# Patient Record
Sex: MALE | Race: WHITE | HISPANIC OR LATINO | Employment: FULL TIME | URBAN - METROPOLITAN AREA
[De-identification: names, ages, dates, MRNs, and addresses within clinical notes are randomized per-mention and may not be internally consistent; named-entity substitution may affect disease eponyms.]

---

## 2018-11-20 ENCOUNTER — OFFICE VISIT (OUTPATIENT)
Dept: MEDICAL GROUP | Facility: PHYSICIAN GROUP | Age: 43
End: 2018-11-20
Payer: COMMERCIAL

## 2018-11-20 VITALS
SYSTOLIC BLOOD PRESSURE: 152 MMHG | OXYGEN SATURATION: 96 % | RESPIRATION RATE: 12 BRPM | BODY MASS INDEX: 31.5 KG/M2 | HEIGHT: 70 IN | HEART RATE: 84 BPM | WEIGHT: 220 LBS | DIASTOLIC BLOOD PRESSURE: 104 MMHG | TEMPERATURE: 99 F

## 2018-11-20 DIAGNOSIS — H11.003 PTERYGIUM OF BOTH EYES: ICD-10-CM

## 2018-11-20 DIAGNOSIS — R03.0 ELEVATED BLOOD PRESSURE READING: ICD-10-CM

## 2018-11-20 DIAGNOSIS — K21.9 GASTROESOPHAGEAL REFLUX DISEASE WITHOUT ESOPHAGITIS: ICD-10-CM

## 2018-11-20 PROCEDURE — 99214 OFFICE O/P EST MOD 30 MIN: CPT | Performed by: FAMILY MEDICINE

## 2018-11-20 ASSESSMENT — PATIENT HEALTH QUESTIONNAIRE - PHQ9: CLINICAL INTERPRETATION OF PHQ2 SCORE: 0

## 2018-11-20 NOTE — ASSESSMENT & PLAN NOTE
This is a new problem which is been going on for a few weeks now.  He does complain of some heartburn after having fatty meals, let us, and lactose-containing foods.  He has tried over-the-counter Tums and Nexium with much relief.  Typically will take the Nexium 1-2 times a week.  He denies any nausea, vomiting or weight loss.

## 2018-11-20 NOTE — PROGRESS NOTES
CC:  Diagnoses of Elevated blood pressure reading, Gastroesophageal reflux disease without esophagitis, Pterygium of both eyes, and BMI 31.0-31.9,adult were pertinent to this visit.    HISTORY OF THE PRESENT ILLNESS: Patient is a 43 y.o. male. This pleasant patient is here today to establish care.  He hasn't seen a doctor for a long time as he has been fairly healthy.  His father is a diabetic and Nadeem is concerned if he may also be a diabetic    Health Maintenance: He is declining the flu vaccine today      Elevated blood pressure reading  This is a new medical issue for him.  He hasn't seen a physician in years.  His blood pressure today 146/104.  He does mention that he is nervous about being here today.  BP was manually rechecked by myself with a reading of 152/104.  He denies any chest pain, palpitations or headaches.    Gastroesophageal reflux disease without esophagitis  This is a new problem which is been going on for a few weeks now.  He does complain of some heartburn after having fatty meals, let us, and lactose-containing foods.  He has tried over-the-counter Tums and Nexium with much relief.  Typically will take the Nexium 1-2 times a week.  He denies any nausea, vomiting or weight loss.      Pterygium of both eyes  This is a chronic issue which he has noticed for some time.  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  He currently denies any visual impairment, but he does have bilateral  pterygium medially.     BMI 31.0-31.9,adult  This is a chronic medical problem for him.  His weight is 220 pounds with a BMI of 31.57.  He doesn't exercise and does skip meals when he's at work.  Typically works 7-4 and will eat when he gets home.  He's concerned about diabetes as his father is a diabetic.      Allergies: Patient has no known allergies.    No current [facility]-ordered outpatient prescriptions on file.     No current [facility]-ordered facility-administered medications on file.   "      Past Medical History:   Diagnosis Date   • GERD (gastroesophageal reflux disease)        History reviewed. No pertinent surgical history.    Social History   Substance Use Topics   • Smoking status: Never Smoker   • Smokeless tobacco: Never Used   • Alcohol use Yes      Comment: Socially        Social History     Social History Narrative   • No narrative on file       Family History   Problem Relation Age of Onset   • Hypertension Mother    • Diabetes Father    • No Known Problems Sister    • No Known Problems Brother    • No Known Problems Brother    • No Known Problems Sister        ROS:     - Constitutional: Negative for fever, chills, and fatigue   - Eyes:  Negative blurry vision or eye pain    - ENT: Negative for ear pain, rhinorrhea, sinus congestion, sore throat    - Respiratory: Negative for cough or shortness of breath    - Cardiovascular: Negative for chest pain, palpitations    - Gastrointestinal:occasional heartburn, nausea, vomiting, abdominal pain,     - Genitourinary: Negative for dysuria    - Musculoskeletal: Negative for myalgias    - Skin: Negative for rash, itching    - Neurological: Negative for headaches, dizziness    - Endo:  Negative for increased thirst or polyuria    - Heme/Lymph: Does not bruise/bleed easily.     - Psychiatric/Behavioral: Negative for depression and anxiety   .      Exam: Blood pressure 152/104, pulse 84, temperature 37.2 °C (99 °F), resp. rate 12, height 1.778 m (5' 10\"), weight 99.8 kg (220 lb), SpO2 96 %. Body mass index is 31.57 kg/m².    General:  Large scar on right forehead, Normal appearing. No distress.  Eyes: bilateral medial pterygium no ptosis,  ENMT:   ears normal shape and contour, canals are clear bilaterally, tympanic membranes are benign, nasal mucosa benign, oropharynx is without erythema, edema or exudates.   Neck:  Supple without JVD or bruit. Thyroid is not enlarged.  Pulmonary:  Clear to ausculation.  Normal effort. No rales, ronchi, or " wheezing.  Cardiovascular:  Regular rate and rhythm without murmur.  Abdomen:  Soft, nontender, nondistended. Normal bowel sounds.  Neurologic:  No tremors  Lymph:  No cervical, supraclavicular  lymph nodes are palpable  Skin:  Warm and dry.  No obvious lesions.  Musculoskeletal:  Normal gait. No extremity cyanosis, clubbing, or edema.  Psych:   Normal mood and affect. Alert and oriented x3. Judgment and insight is normal.    Please note that this dictation was created using voice recognition software. I have made every reasonable attempt to correct obvious errors, but I expect that there are errors of grammar and possibly content that I did not discover before finalizing the note.      Assessment/Plan  1. Elevated blood pressure reading  He will return next week for a follow-up blood pressure reading  He is encouraged to get his labs drawn prior to the appointment.  - Lipid Profile; Future  - COMP METABOLIC PANEL; Future    2. Gastroesophageal reflux disease without esophagitis  This is a new medical problem which is currently well controlled with over-the-counter Nexium.    3. Pterygium of both eyes  This is a chronic issue and a referral to ophthalmology has been placed.  - REFERRAL TO OPHTHALMOLOGY    4. BMI 31.0-31.9,adult  This is a chronic issue and diet, exercise, weight loss, and lifestyle modifications have been discussed in detail with him today.  - Patient identified as having weight management issue.  Appropriate orders and counseling given.    F/u next week         No Follow-up on file.

## 2018-11-20 NOTE — ASSESSMENT & PLAN NOTE
This is a chronic medical problem for him.  His weight is 220 pounds with a BMI of 31.57.  He doesn't exercise and does skip meals when he's at work.  Typically works 7-4 and will eat when he gets home.  He's concerned about diabetes as his father is a diabetic.

## 2018-11-20 NOTE — ASSESSMENT & PLAN NOTE
This is a chronic issue which he has noticed for some time.  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  He currently denies any visual impairment, but he does have bilateral  pterygium medially.

## 2018-11-27 ENCOUNTER — OFFICE VISIT (OUTPATIENT)
Dept: MEDICAL GROUP | Facility: PHYSICIAN GROUP | Age: 43
End: 2018-11-27
Payer: COMMERCIAL

## 2018-11-27 VITALS
HEIGHT: 70 IN | SYSTOLIC BLOOD PRESSURE: 144 MMHG | OXYGEN SATURATION: 96 % | TEMPERATURE: 98.8 F | WEIGHT: 221 LBS | BODY MASS INDEX: 31.64 KG/M2 | DIASTOLIC BLOOD PRESSURE: 102 MMHG | RESPIRATION RATE: 12 BRPM | HEART RATE: 80 BPM

## 2018-11-27 DIAGNOSIS — Z23 NEED FOR VACCINATION: ICD-10-CM

## 2018-11-27 DIAGNOSIS — I10 ESSENTIAL HYPERTENSION: ICD-10-CM

## 2018-11-27 PROBLEM — R03.0 ELEVATED BLOOD PRESSURE READING: Status: RESOLVED | Noted: 2018-11-20 | Resolved: 2018-11-27

## 2018-11-27 PROCEDURE — 90471 IMMUNIZATION ADMIN: CPT | Performed by: FAMILY MEDICINE

## 2018-11-27 PROCEDURE — 90686 IIV4 VACC NO PRSV 0.5 ML IM: CPT | Performed by: FAMILY MEDICINE

## 2018-11-27 PROCEDURE — 99213 OFFICE O/P EST LOW 20 MIN: CPT | Mod: 25 | Performed by: FAMILY MEDICINE

## 2018-11-27 RX ORDER — LISINOPRIL 5 MG/1
5 TABLET ORAL DAILY
Qty: 30 TAB | Refills: 2 | Status: SHIPPED | OUTPATIENT
Start: 2018-11-27 | End: 2018-12-19 | Stop reason: SDUPTHER

## 2018-11-28 NOTE — PROGRESS NOTES
cc: High blood pressure    Subjective:     Nadeem Zapata is a 43 y.o. male presenting for a one-week follow-up of elevated blood pressure.  His blood pressure was elevated approximately 1 week ago and on further review of his records he did have high blood pressure 2 years ago as well    Essential hypertension  This is a new diagnosis for him.  His blood pressure has been elevated for at least the last 2 years.  He was seen in the ER August 2016 and at that time his blood pressure was 145/110.  Last week it was 152/104 and today it is 144/102.  He denies any headaches or chest pain.  He was given a prescription for lab draws but has not had a chance to get these labs drawn.  He does have a family history of hypertension.        Review of systems:    Patient Active Problem List    Diagnosis Date Noted   • Essential hypertension 11/27/2018   • Gastroesophageal reflux disease without esophagitis 11/20/2018   • Pterygium of both eyes 11/20/2018   • BMI 31.0-31.9,adult 11/20/2018     Past Medical History:   Diagnosis Date   • GERD (gastroesophageal reflux disease)      No current outpatient prescriptions on file prior to visit.     No current facility-administered medications on file prior to visit.      No Known Allergies  Family History   Problem Relation Age of Onset   • Hypertension Mother    • Diabetes Father    • No Known Problems Sister    • No Known Problems Brother    • No Known Problems Brother    • No Known Problems Sister      Social History   Substance Use Topics   • Smoking status: Never Smoker   • Smokeless tobacco: Never Used   • Alcohol use Yes      Comment: Socially        ROS  Constitutional :  No fevers, chills, fatigue.  Respiratory : No chronic cough, No shortness of breath,  Cardiovascular : No chest pain, No palpitations  Neurologic : No headaches,    Psychiatric : No depression, No anxiety      Current Outpatient Prescriptions:   •  lisinopril (PRINIVIL) 5 MG Tab, Take 1 Tab by mouth every  "day., Disp: 30 Tab, Rfl: 2    Allergies, past medical history, past surgical history, family history, social history reviewed and updated    Objective:     Vitals: /102   Pulse 80   Temp 37.1 °C (98.8 °F)   Resp 12   Ht 1.778 m (5' 10\")   Wt 100.2 kg (221 lb)   SpO2 96%   BMI 31.71 kg/m²   General:  Alert, pleasant, NAD  Heart:  Regular rate and rhythm. S1 and S2 normal. No murmurs appreciated. No LE edema  Respiratory:  Normal respiratory effort.  Clear to auscultation bilaterally.  Skin:  Warm, dry, no rashes, no jaundice  Neurological: No tremors,   Psych:   Affect/mood is normal, judgement is good, memory is intact, grooming is appropriate.    Assessment/Plan:     Nadeem was seen today for hypertension.    Diagnoses and all orders for this visit:    Essential hypertension  This is a new diagnosis for him.  I have encouraged him to get his labs drawn over the next 2 weeks.  We will start him on lisinopril 5 mg daily.  In the meantime he will check his blood pressure at a pharmacy twice a week and bring those readings to me at his next follow-up appointment.  I've written down for him our desired BP parameters.  -     lisinopril (PRINIVIL) 5 MG Tab; Take 1 Tab by mouth every day.    Need for vaccination  He will receive the flu vaccine today.  -     Influenza Vaccine Quad Injection >3Y (PF)        No Follow-up on file.  "

## 2018-11-28 NOTE — ASSESSMENT & PLAN NOTE
This is a new diagnosis for him.  His blood pressure has been elevated for at least the last 2 years.  He was seen in the ER August 2016 and at that time his blood pressure was 145/110.  Last week it was 152/104 and today it is 144/102.  He denies any headaches or chest pain.  He was given a prescription for lab draws but has not had a chance to get these labs drawn.

## 2018-12-19 DIAGNOSIS — I10 ESSENTIAL HYPERTENSION: ICD-10-CM

## 2018-12-19 RX ORDER — LISINOPRIL 5 MG/1
5 TABLET ORAL DAILY
Qty: 90 TAB | Refills: 3 | Status: SHIPPED | OUTPATIENT
Start: 2018-12-19 | End: 2019-03-18 | Stop reason: SDUPTHER

## 2019-01-02 ENCOUNTER — OFFICE VISIT (OUTPATIENT)
Dept: MEDICAL GROUP | Facility: PHYSICIAN GROUP | Age: 44
End: 2019-01-02
Payer: COMMERCIAL

## 2019-01-02 VITALS
DIASTOLIC BLOOD PRESSURE: 88 MMHG | RESPIRATION RATE: 12 BRPM | TEMPERATURE: 99.6 F | WEIGHT: 213 LBS | BODY MASS INDEX: 30.49 KG/M2 | OXYGEN SATURATION: 96 % | HEIGHT: 70 IN | HEART RATE: 86 BPM | SYSTOLIC BLOOD PRESSURE: 116 MMHG

## 2019-01-02 DIAGNOSIS — I10 ESSENTIAL HYPERTENSION: ICD-10-CM

## 2019-01-02 DIAGNOSIS — E78.2 MIXED HYPERLIPIDEMIA: ICD-10-CM

## 2019-01-02 DIAGNOSIS — K21.9 GASTROESOPHAGEAL REFLUX DISEASE WITHOUT ESOPHAGITIS: ICD-10-CM

## 2019-01-02 PROCEDURE — 99214 OFFICE O/P EST MOD 30 MIN: CPT | Performed by: FAMILY MEDICINE

## 2019-01-03 NOTE — ASSESSMENT & PLAN NOTE
This is a chronic medical condition for which he was started on lisinopril 5 mg a month ago.  His blood pressure today is 116/88.  He denies any headaches or palpitations.

## 2019-01-03 NOTE — ASSESSMENT & PLAN NOTE
This is a new diagnosis for him.  His most recent FLP from November 30, 2018 was total cholesterol 208, HDL 43, triglycerides 68, and .

## 2019-01-03 NOTE — PROGRESS NOTES
CC:  Diagnoses of Essential hypertension and Gastroesophageal reflux disease without esophagitis were pertinent to this visit.    HISTORY OF THE PRESENT ILLNESS: Patient is a 43 y.o. male. This pleasant patient is here today for BP check.  He was started on lisinopril around Thanksgiving.      Essential hypertension  This is a chronic medical condition for which he was started on lisinopril 5 mg a month ago.  His blood pressure today is 116/88.  He denies any headaches or palpitations.  He has been checking his BPs at a pharmacy and brings in a list of BP readings.  SBP range 110s-130s, with diastolics in the 70-80s range.      Gastroesophageal reflux disease without esophagitis  This is a chronic medical problem.  He continues to complain of heartburn after eating.   He complains of epigastric pain with these episodes. Takes tums daily.  Was previously also taking nexium but stopped taking it as he doesn't want to take too many meds.  Denies any nausea or vomiting.  Has intentionally lost approximately 8 lbs over the last 6 weeks by eating less.      Mixed hyperlipidemia  This is a new diagnosis for him.  His most recent FLP from November 30, 2018 was total cholesterol 208, HDL 43, triglycerides 68, and .      Allergies: Patient has no known allergies.    Current Outpatient Prescriptions Ordered in [facility]   Medication Sig Dispense Refill   • lisinopril (PRINIVIL) 5 MG Tab Take 1 Tab by mouth every day. 90 Tab 3     No current [facility]-ordered facility-administered medications on file.        Past Medical History:   Diagnosis Date   • GERD (gastroesophageal reflux disease)        History reviewed. No pertinent surgical history.    Social History   Substance Use Topics   • Smoking status: Never Smoker   • Smokeless tobacco: Never Used   • Alcohol use Yes      Comment: Socially        Social History     Social History Narrative   • No narrative on file       Family History   Problem Relation Age of Onset   •  "Hypertension Mother    • Diabetes Father    • No Known Problems Sister    • No Known Problems Brother    • No Known Problems Brother    • No Known Problems Sister        ROS:     - Constitutional: Negative for fever, chills, and fatigue   - Eyes:  Negative blurry vision or eye pain    - ENT: Negative for ear pain, rhinorrhea, sinus congestion, sore throat    - Respiratory: Negative for cough or shortness of breath    - Cardiovascular: Negative for chest pain, palpitations    - Gastrointestinal: Negative for heartburn, nausea, vomiting, abdominal pain,     - Genitourinary: Negative for dysuria    - Musculoskeletal: Negative for myalgias    - Skin: Negative for rash, itching    - Neurological: Negative for headaches, dizziness    - Endo:  Negative for increased thirst or polyuria    - Heme/Lymph: Does not bruise/bleed easily.     - Psychiatric/Behavioral: Negative for depression and anxiety   .      Exam: Blood pressure 116/88, pulse 86, temperature 37.6 °C (99.6 °F), resp. rate 12, height 1.778 m (5' 10\"), weight 96.6 kg (213 lb), SpO2 96 %. Body mass index is 30.56 kg/m².    General:  Normal appearing. No distress.  Neck:  Supple without JVD or bruit. Thyroid is not enlarged.  Pulmonary:  Clear to ausculation.  Normal effort. No rales, ronchi, or wheezing.  Cardiovascular: Regular rate and rhythm without murmur.  Abdomen:  Soft, nontender, nondistended. Normal bowel sounds.  Neurologic:  No tremors  Skin:  Warm and dry.  No obvious lesions.  Psych:   Normal mood and affect. Alert and oriented x3. Judgment and insight is normal.    Please note that this dictation was created using voice recognition software. I have made every reasonable attempt to correct obvious errors, but I expect that there are errors of grammar and possibly content that I did not discover before finalizing the note.      Assessment/Plan  1. Essential hypertension  This is a chronic medical problem which is well controlled with lisinopril 5 mg " daily.    2. Gastroesophageal reflux disease without esophagitis   This is a chronic medical condition which is not well controlled with over the counter tums.   He's encouraged to restart his nexium or try zantac.  He will let me know within 4 weeks.      3. Mixed hyperlipidemia  This is a new diagnosis for him.  We did discuss lifestyle modifications including diet, exercise, and weight loss.  Will plan on rechecking these values in 4-6 months.        Return in about 2 months (around 3/2/2019).

## 2019-01-03 NOTE — ASSESSMENT & PLAN NOTE
This is a chronic medical problem.  He continues to complain of heartburn after eating.   He complains of epigastric pain with these episodes. Takes tums daily.  Was previously also taking nexium but stopped taking it as he doesn't want to take too many meds.  Denies any nausea or vomiting.  Has intentionally lost approximately 8 lbs over the last 6 weeks by eating less.

## 2019-03-05 ENCOUNTER — OFFICE VISIT (OUTPATIENT)
Dept: MEDICAL GROUP | Facility: PHYSICIAN GROUP | Age: 44
End: 2019-03-05
Payer: COMMERCIAL

## 2019-03-05 VITALS
BODY MASS INDEX: 29.92 KG/M2 | DIASTOLIC BLOOD PRESSURE: 98 MMHG | WEIGHT: 209 LBS | HEIGHT: 70 IN | OXYGEN SATURATION: 96 % | SYSTOLIC BLOOD PRESSURE: 138 MMHG | RESPIRATION RATE: 12 BRPM | TEMPERATURE: 98.8 F | HEART RATE: 74 BPM

## 2019-03-05 DIAGNOSIS — I10 ESSENTIAL HYPERTENSION: ICD-10-CM

## 2019-03-05 DIAGNOSIS — K21.9 GASTROESOPHAGEAL REFLUX DISEASE WITHOUT ESOPHAGITIS: ICD-10-CM

## 2019-03-05 DIAGNOSIS — E78.2 MIXED HYPERLIPIDEMIA: ICD-10-CM

## 2019-03-05 DIAGNOSIS — R05.9 COUGH: ICD-10-CM

## 2019-03-05 PROCEDURE — 99214 OFFICE O/P EST MOD 30 MIN: CPT | Performed by: FAMILY MEDICINE

## 2019-03-05 ASSESSMENT — PATIENT HEALTH QUESTIONNAIRE - PHQ9: CLINICAL INTERPRETATION OF PHQ2 SCORE: 0

## 2019-03-06 NOTE — PROGRESS NOTES
cc: Follow-up hypertension    Subjective:     Nadeem Zapata is a 44 y.o. male presenting for follow-up of hypertension.  He is scheduled for oral surgery on March 12 and mentions that he just recently finished a 5-day course of amoxicillin.  He mentions that he is also scheduled for right eye surgery on April 23.    Essential hypertension  This is a chronic medical problem for which she was started on lisinopril late last year.  Currently he is taking lisinopril 5 mg daily.  He denies any headaches or chest pain.    He has been checking his blood pressure weekly at a local pharmacy and majority of those readings are systolics 120s or 130s.   His blood pressure today in the office is 138/98.   On my repeat check it is 140/98.     Cough  This is a new issue which started approximately 4 days ago.    He is complaining of cough at nighttime.  Denies any fever chills or upper respiratory symptoms.  Denies any sick contacts or any lower extremity edema.      Mixed hyperlipidemia  This is a chronic medical problem which was diagnosed in November 2018.    His labs were reviewed and his total cholesterol was elevated at 208, HDL 43, triglycerides 68, and .   He is currently trying to manage that with weight loss and diet.    Gastroesophageal reflux disease without esophagitis  This is a chronic medical problem for which he has noticed some improvement by changing his diet.  He is currently down to taking Nexium approximately 3 times a week.  Denies any nausea vomiting or abdominal pain.         Review of systems:    Patient Active Problem List    Diagnosis Date Noted   • Cough 03/05/2019   • Mixed hyperlipidemia 01/02/2019   • Essential hypertension 11/27/2018   • Gastroesophageal reflux disease without esophagitis 11/20/2018   • Pterygium of both eyes 11/20/2018   • BMI 31.0-31.9,adult 11/20/2018     Past Medical History:   Diagnosis Date   • GERD (gastroesophageal reflux disease)      Current Outpatient  "Prescriptions on File Prior to Visit   Medication Sig Dispense Refill   • lisinopril (PRINIVIL) 5 MG Tab Take 1 Tab by mouth every day. 90 Tab 3     No current facility-administered medications on file prior to visit.      No Known Allergies  Family History   Problem Relation Age of Onset   • Hypertension Mother    • Diabetes Father    • No Known Problems Sister    • No Known Problems Brother    • No Known Problems Brother    • No Known Problems Sister      Social History   Substance Use Topics   • Smoking status: Never Smoker   • Smokeless tobacco: Never Used   • Alcohol use Yes      Comment: Socially        ROS  Constitutional :  No fevers, chills, fatigue.  Eye :  No eye pain, no redness,  ENT: no sore throat, no tinnitus  Respiratory : Nighttime cough, No shortness of breath,  Cardiovascular : No chest pain, No palpitations  Gastrointestinal : Occasional heartburn but overall much improved.  No abdominal pain, No nausea, vomiting, diarrhea, or constipation  Genitourinary:  no dysuria, no hematuria  Musculoskeletal/Extremities : no muscle weakness, no joint swelling,  Hematologic/Lymphatic :  No easy bruising, No night sweats, No swollen nodes  Skin/Integumentary :No evidence of rash. No pruritus  Neurologic : No headaches,  No tremors,  Psychiatric : No depression, No anxiety      Current Outpatient Prescriptions:   •  lisinopril (PRINIVIL) 5 MG Tab, Take 1 Tab by mouth every day., Disp: 90 Tab, Rfl: 3    Allergies, past medical history, past surgical history, family history, social history reviewed and updated    Objective:     Vitals: /98   Pulse 74   Temp 37.1 °C (98.8 °F)   Resp 12   Ht 1.778 m (5' 10\")   Wt 94.8 kg (209 lb)   SpO2 96%   BMI 29.99 kg/m²   General:  Alert, pleasant, NAD  ENMT:  External ears and nose are normal. , Oropharynx non-erythematous, no exudates    Neck  supple,  No thyromegaly or masses palpated,  No cervical or supraclavicular lymphadenopathy.  Heart:  Regular rate and " rhythm,  No LE edema  Respiratory:  Normal respiratory effort, Clear to auscultation bilaterally.  Abdomen:   soft, Non-distended, non tender,   Skin:  Warm, dry, no rashes, no jaundice  Musculoskeletal:  Normal gait, Normal digits and nails.  Neurological: No tremors,  Psych:   Affect/mood is normal, judgement is good, memory is intact, grooming is appropriate.    Assessment/Plan:     1. Essential hypertension  Chronic medical condition.  Home blood pressure readings are stable.  His blood pressure is slightly elevated in the office.  For now he will continue with lisinopril 5 mg daily.  He will keep a blood pressure log for the next week and follow-up with us in 1 week for an MA visit for blood pressure.    2. Cough  This is a new problem for him.  He does not have any other associated symptoms with this cough.  We will continue to monitor for now.  Cough as a side effect of lisinopril was discussed with the patient.  For now no changes to his medication.    3. Mixed hyperlipidemia  Chronic medical problem.  Currently on no medications.  We will recheck labs prior to his next appointment with me.  - Lipid Profile; Future    4. Gastroesophageal reflux disease without esophagitis  Chronic medical problem.  Improving.  Continue with Nexium as needed.      Return in about 3 months (around 6/5/2019) for 1 week for MA visit and 3 mths with PCP.

## 2019-03-13 ENCOUNTER — APPOINTMENT (OUTPATIENT)
Dept: MEDICAL GROUP | Facility: PHYSICIAN GROUP | Age: 44
End: 2019-03-13
Payer: COMMERCIAL

## 2019-03-18 ENCOUNTER — OFFICE VISIT (OUTPATIENT)
Dept: MEDICAL GROUP | Facility: PHYSICIAN GROUP | Age: 44
End: 2019-03-18
Payer: COMMERCIAL

## 2019-03-18 VITALS
WEIGHT: 215 LBS | TEMPERATURE: 98.4 F | RESPIRATION RATE: 16 BRPM | OXYGEN SATURATION: 96 % | DIASTOLIC BLOOD PRESSURE: 92 MMHG | BODY MASS INDEX: 30.1 KG/M2 | HEART RATE: 87 BPM | HEIGHT: 71 IN | SYSTOLIC BLOOD PRESSURE: 148 MMHG

## 2019-03-18 DIAGNOSIS — R05.9 COUGH: ICD-10-CM

## 2019-03-18 DIAGNOSIS — I10 ESSENTIAL HYPERTENSION: ICD-10-CM

## 2019-03-18 PROCEDURE — 99214 OFFICE O/P EST MOD 30 MIN: CPT | Performed by: FAMILY MEDICINE

## 2019-03-18 RX ORDER — LISINOPRIL 10 MG/1
10 TABLET ORAL DAILY
Qty: 90 TAB | Refills: 3 | Status: SHIPPED | OUTPATIENT
Start: 2019-03-18 | End: 2020-06-05

## 2019-03-18 RX ORDER — BENZONATATE 100 MG/1
100 CAPSULE ORAL 3 TIMES DAILY PRN
Qty: 60 CAP | Refills: 0 | Status: ON HOLD | OUTPATIENT
Start: 2019-03-18 | End: 2019-04-23

## 2019-03-18 NOTE — PROGRESS NOTES
"cc: Hypertension    Subjective:     Nadeem Zapata is a 44 y.o. male presenting for further evaluation of his hypertension.  He was seen for a nurse's visit 5 days ago and at that time his lisinopril was increased to 10 mg daily.    1.  Hypertension  This is a chronic medical problem is currently not well controlled.  His blood pressure today in the office was initially 148/92.  On my repeat check it was 136/94.  He mentions that he has been checking his blood pressure at a local pharmacy with systolics in the 120s range and diastolics in the 80s.  He denies any chest pain or headaches.  Does mention that he is currently fighting an upper respiratory illness.    2.  Cough  This is an acute illness which started approximately 1 week ago.  He denies any fevers or chills.  Denies any sore throat or any sick contacts.  Complains that his cough is worse at nighttime.  Also complains of some nasal congestion.  Denies any ear pain or ear fullness.  Has not been trying any over-the-counter medications for this.  Has not noticed any improvement in his symptoms.    Review of systems:  See above and   No Known Allergies      Current Outpatient Prescriptions:   •  lisinopril (PRINIVIL) 10 MG Tab, Take 1 Tab by mouth every day., Disp: 90 Tab, Rfl: 3  •  benzonatate (TESSALON) 100 MG Cap, Take 1 Cap by mouth 3 times a day as needed for Cough., Disp: 60 Cap, Rfl: 0    Allergies, past medical history, past surgical history, family history, social history reviewed and updated    Objective:     Vitals: /92 (BP Location: Left arm, Patient Position: Sitting, BP Cuff Size: Adult)   Pulse 87   Temp 36.9 °C (98.4 °F) (Temporal)   Resp 16   Ht 1.803 m (5' 11\")   Wt 97.5 kg (215 lb)   SpO2 96%   BMI 29.99 kg/m²   General:  Alert, pleasant, NAD  Eyes: bilateral pterygium   ENMT:  External ears and nose are normal.    Neck  supple,   Heart:  Regular rate and rhythm,  No LE edema  Respiratory:  Normal respiratory effort, " Clear to auscultation bilaterally but diminished  Abdomen:   soft, Non-distended,   Skin:  Warm, dry, no rashes,   Musculoskeletal:  Normal gait, Normal digits and nails.  Neurological: No tremors,   Psych:   Affect/mood is normal, judgement is good, memory is intact, grooming is appropriate.    Assessment/Plan:     Nadeem was seen today for hypertension.    Diagnoses and all orders for this visit:    Essential hypertension  -Chronic medical condition.  Improving.  Lisinopril was increased to 10 mg last week.  We will continue with this dose and have him follow-up for a BP check next week.     Lisinopril (PRINIVIL) 10 MG Tab; Take 1 Tab by mouth every day.    Cough  Acute medical problem.  Tessalon Perles sent over to pharmacy.  We will continue to monitor.-     benzonatate (TESSALON) 100 MG Cap; Take 1 Cap by mouth 3 times a day as needed for Cough.          Return in about 1 week (around 3/25/2019) for MA visit for BP.

## 2019-03-27 ENCOUNTER — OFFICE VISIT (OUTPATIENT)
Dept: MEDICAL GROUP | Facility: PHYSICIAN GROUP | Age: 44
End: 2019-03-27
Payer: COMMERCIAL

## 2019-03-27 VITALS
RESPIRATION RATE: 16 BRPM | DIASTOLIC BLOOD PRESSURE: 84 MMHG | BODY MASS INDEX: 30.1 KG/M2 | HEART RATE: 77 BPM | WEIGHT: 215 LBS | SYSTOLIC BLOOD PRESSURE: 128 MMHG | HEIGHT: 71 IN | TEMPERATURE: 98.8 F | OXYGEN SATURATION: 97 %

## 2019-03-27 DIAGNOSIS — I10 ESSENTIAL HYPERTENSION: ICD-10-CM

## 2019-03-27 DIAGNOSIS — E78.2 MIXED HYPERLIPIDEMIA: ICD-10-CM

## 2019-03-27 PROCEDURE — 99214 OFFICE O/P EST MOD 30 MIN: CPT | Performed by: FAMILY MEDICINE

## 2019-03-27 NOTE — PROGRESS NOTES
"cc: Hypertension    Subjective:     Nadeem Zapata is a 44 y.o. male presenting for follow-up of hypertension.    1.  Hypertension  This is a chronic medical problem for which he has been on lisinopril since November 2018.  His dose was increased from 5-10 mg 2 weeks ago.  His blood pressure today is better at 128/84.  He denies any headaches or pain.  He is still recovering from a viral upper respiratory illness and does complain of an occasional cough.  Denies any fevers or chills or shortness of breath.    2.  Hyperlipidemia  This is a chronic medical problem for which she has been monitoring his diet.  His labs from November 2018 did show total cholesterol 208, HDL 43, triglycerides 68, and .  He denies any chest pain.  Continues to complain of occasional heartburn based on diet.      Review of systems:  See above   No Known Allergies      Current Outpatient Prescriptions:   •  lisinopril (PRINIVIL) 10 MG Tab, Take 1 Tab by mouth every day., Disp: 90 Tab, Rfl: 3  •  benzonatate (TESSALON) 100 MG Cap, Take 1 Cap by mouth 3 times a day as needed for Cough., Disp: 60 Cap, Rfl: 0    Allergies, past medical history, past surgical history, family history, social history reviewed and updated    Objective:     Vitals: /84 (BP Location: Left arm, Patient Position: Sitting, BP Cuff Size: Adult)   Pulse 77   Temp 37.1 °C (98.8 °F) (Temporal)   Resp 16   Ht 1.803 m (5' 11\")   Wt 97.5 kg (215 lb)   SpO2 97%   BMI 29.99 kg/m²   General:  Alert, pleasant, NAD  Eyes: bilateral pterygium, EOMI,   ENMT:  External ears and nose are normal.    Neck  supple,   Heart:  Regular rate and rhythm,  No LE edema  Respiratory:  Normal respiratory effort, Clear to auscultation bilaterally.  Abdomen:   soft, Non-distended,   Skin:  Warm, dry, no rashes,   Musculoskeletal:  Normal gait, Normal digits and nails.  Neurological: No tremors,   Psych:   Affect/mood is normal, judgement is good, memory is intact, grooming is " appropriate.    Assessment/Plan:     Nadeem was seen today for follow-up.    Diagnoses and all orders for this visit:    Essential hypertension  Chronic medical problem.  Improving.  Continue with lisinopril 10 mg daily.  Blood pressure parameters discussed with Nadeem.  He will continue to check his blood pressures at a pharmacy and send me readings.  Follow-up with me for scheduled appointment on June 4.    Mixed hyperlipidemia  Chronic medical problem.  He is currently doing lifestyle modifications.  Low cholesterol diet discussed in detail with him today.  Follow-up with me in 3 months and fasting lipid profile to be done before the appointment.    BMI 31.0-31.9,adult  Chronic medical problem.  Diet, weight loss and exercise discussed with with him.  He does have a family history of hypertension and diabetes in both parents.      Follow-up on June 4 for scheduled appointment.    No Follow-up on file.

## 2019-04-23 ENCOUNTER — HOSPITAL ENCOUNTER (OUTPATIENT)
Facility: MEDICAL CENTER | Age: 44
End: 2019-04-23
Attending: OPHTHALMOLOGY | Admitting: OPHTHALMOLOGY
Payer: COMMERCIAL

## 2019-04-23 ENCOUNTER — ANESTHESIA (OUTPATIENT)
Dept: SURGERY | Facility: MEDICAL CENTER | Age: 44
End: 2019-04-23
Payer: COMMERCIAL

## 2019-04-23 ENCOUNTER — ANESTHESIA EVENT (OUTPATIENT)
Dept: SURGERY | Facility: MEDICAL CENTER | Age: 44
End: 2019-04-23
Payer: COMMERCIAL

## 2019-04-23 VITALS
HEART RATE: 58 BPM | TEMPERATURE: 98 F | HEIGHT: 71 IN | OXYGEN SATURATION: 95 % | WEIGHT: 214.95 LBS | RESPIRATION RATE: 12 BRPM | BODY MASS INDEX: 30.09 KG/M2

## 2019-04-23 PROCEDURE — 700105 HCHG RX REV CODE 258: Performed by: OPHTHALMOLOGY

## 2019-04-23 PROCEDURE — 700101 HCHG RX REV CODE 250

## 2019-04-23 PROCEDURE — 160048 HCHG OR STATISTICAL LEVEL 1-5: Performed by: OPHTHALMOLOGY

## 2019-04-23 PROCEDURE — 501425 HCHG SPONGE, WECKCEL SPEAR: Performed by: OPHTHALMOLOGY

## 2019-04-23 PROCEDURE — 700101 HCHG RX REV CODE 250: Performed by: OPHTHALMOLOGY

## 2019-04-23 PROCEDURE — 700111 HCHG RX REV CODE 636 W/ 250 OVERRIDE (IP): Performed by: OPHTHALMOLOGY

## 2019-04-23 PROCEDURE — 502000 HCHG MISC OR IMPLANTS RC 0278: Performed by: OPHTHALMOLOGY

## 2019-04-23 PROCEDURE — 160029 HCHG SURGERY MINUTES - 1ST 30 MINS LEVEL 4: Performed by: OPHTHALMOLOGY

## 2019-04-23 PROCEDURE — 160041 HCHG SURGERY MINUTES - EA ADDL 1 MIN LEVEL 4: Performed by: OPHTHALMOLOGY

## 2019-04-23 PROCEDURE — 700111 HCHG RX REV CODE 636 W/ 250 OVERRIDE (IP)

## 2019-04-23 PROCEDURE — 700111 HCHG RX REV CODE 636 W/ 250 OVERRIDE (IP): Performed by: ANESTHESIOLOGY

## 2019-04-23 PROCEDURE — 160009 HCHG ANES TIME/MIN: Performed by: OPHTHALMOLOGY

## 2019-04-23 PROCEDURE — 500882 HCHG PACK, EYE CUSTOM CATARACT: Performed by: OPHTHALMOLOGY

## 2019-04-23 PROCEDURE — 501838 HCHG SUTURE GENERAL: Performed by: OPHTHALMOLOGY

## 2019-04-23 PROCEDURE — 110454 HCHG SHELL REV 250: Performed by: OPHTHALMOLOGY

## 2019-04-23 PROCEDURE — 160035 HCHG PACU - 1ST 60 MINS PHASE I: Performed by: OPHTHALMOLOGY

## 2019-04-23 PROCEDURE — 160002 HCHG RECOVERY MINUTES (STAT): Performed by: OPHTHALMOLOGY

## 2019-04-23 PROCEDURE — A6410 STERILE EYE PAD: HCPCS | Performed by: OPHTHALMOLOGY

## 2019-04-23 RX ORDER — SODIUM CHLORIDE, SODIUM LACTATE, POTASSIUM CHLORIDE, CALCIUM CHLORIDE 600; 310; 30; 20 MG/100ML; MG/100ML; MG/100ML; MG/100ML
INJECTION, SOLUTION INTRAVENOUS CONTINUOUS
Status: DISCONTINUED | OUTPATIENT
Start: 2019-04-23 | End: 2019-04-23 | Stop reason: HOSPADM

## 2019-04-23 RX ORDER — OXYCODONE HCL 5 MG/5 ML
10 SOLUTION, ORAL ORAL
Status: DISCONTINUED | OUTPATIENT
Start: 2019-04-23 | End: 2019-04-23 | Stop reason: HOSPADM

## 2019-04-23 RX ORDER — HALOPERIDOL 5 MG/ML
1 INJECTION INTRAMUSCULAR
Status: DISCONTINUED | OUTPATIENT
Start: 2019-04-23 | End: 2019-04-23 | Stop reason: HOSPADM

## 2019-04-23 RX ORDER — MITOMYCIN 5 MG/10ML
INJECTION, POWDER, LYOPHILIZED, FOR SOLUTION INTRAVENOUS
Status: DISCONTINUED | OUTPATIENT
Start: 2019-04-23 | End: 2019-04-23 | Stop reason: HOSPADM

## 2019-04-23 RX ORDER — MEPERIDINE HYDROCHLORIDE 25 MG/ML
6.25 INJECTION INTRAMUSCULAR; INTRAVENOUS; SUBCUTANEOUS
Status: DISCONTINUED | OUTPATIENT
Start: 2019-04-23 | End: 2019-04-23 | Stop reason: HOSPADM

## 2019-04-23 RX ORDER — OXYCODONE HCL 5 MG/5 ML
5 SOLUTION, ORAL ORAL
Status: DISCONTINUED | OUTPATIENT
Start: 2019-04-23 | End: 2019-04-23 | Stop reason: HOSPADM

## 2019-04-23 RX ORDER — ONDANSETRON 2 MG/ML
4 INJECTION INTRAMUSCULAR; INTRAVENOUS
Status: DISCONTINUED | OUTPATIENT
Start: 2019-04-23 | End: 2019-04-23 | Stop reason: HOSPADM

## 2019-04-23 RX ORDER — DEXAMETHASONE SODIUM PHOSPHATE 4 MG/ML
INJECTION, SOLUTION INTRA-ARTICULAR; INTRALESIONAL; INTRAMUSCULAR; INTRAVENOUS; SOFT TISSUE
Status: DISCONTINUED | OUTPATIENT
Start: 2019-04-23 | End: 2019-04-23 | Stop reason: HOSPADM

## 2019-04-23 RX ORDER — LIDOCAINE HCL/EPINEPHRINE/PF 2%-1:200K
VIAL (ML) INJECTION
Status: DISCONTINUED | OUTPATIENT
Start: 2019-04-23 | End: 2019-04-23 | Stop reason: HOSPADM

## 2019-04-23 RX ORDER — HYDRALAZINE HYDROCHLORIDE 20 MG/ML
5 INJECTION INTRAMUSCULAR; INTRAVENOUS
Status: DISCONTINUED | OUTPATIENT
Start: 2019-04-23 | End: 2019-04-23 | Stop reason: HOSPADM

## 2019-04-23 RX ORDER — DIPHENHYDRAMINE HYDROCHLORIDE 50 MG/ML
12.5 INJECTION INTRAMUSCULAR; INTRAVENOUS
Status: DISCONTINUED | OUTPATIENT
Start: 2019-04-23 | End: 2019-04-23 | Stop reason: HOSPADM

## 2019-04-23 RX ADMIN — SODIUM CHLORIDE, POTASSIUM CHLORIDE, SODIUM LACTATE AND CALCIUM CHLORIDE: 600; 310; 30; 20 INJECTION, SOLUTION INTRAVENOUS at 12:55

## 2019-04-23 RX ADMIN — MIDAZOLAM HYDROCHLORIDE 2 MG: 1 INJECTION, SOLUTION INTRAMUSCULAR; INTRAVENOUS at 13:30

## 2019-04-23 RX ADMIN — FENTANYL CITRATE 50 MCG: 50 INJECTION, SOLUTION INTRAMUSCULAR; INTRAVENOUS at 13:33

## 2019-04-23 RX ADMIN — FENTANYL CITRATE 50 MCG: 50 INJECTION, SOLUTION INTRAMUSCULAR; INTRAVENOUS at 13:39

## 2019-04-23 ASSESSMENT — PAIN SCALES - GENERAL: PAIN_LEVEL: 0

## 2019-04-23 NOTE — ANESTHESIA PREPROCEDURE EVALUATION
Relevant Problems   (+) BMI 31.0-31.9,adult   (+) Cough   (+) Essential hypertension   (+) Gastroesophageal reflux disease without esophagitis   (+) Mixed hyperlipidemia   (+) Pterygium of both eyes       Physical Exam    Airway   Mallampati: II  TM distance: >3 FB  Neck ROM: full       Cardiovascular - normal exam  Rhythm: regular  Rate: normal  (-) murmur     Dental - normal exam         Pulmonary - normal exam  Breath sounds clear to auscultation     Abdominal    Neurological - normal exam                 Anesthesia Plan    ASA 2       Plan - MAC                   Pertinent diagnostic labs and testing reviewed    Informed Consent:    Anesthetic plan and risks discussed with patient.

## 2019-04-23 NOTE — OR NURSING
1418 Pt transferred to PACU. Report received from OR RN and anesthesia. Pt is awake and alert. VS stable, respirations even and unlabored. R eye with patch and tape, CDI.    1430 Pt continues to rest. States he has no needs at this time.    1506 Pt and friend educated on discharge instructions. Verbalized understanding. All questions answered. All belongings are with patient.

## 2019-04-23 NOTE — PROGRESS NOTES
Pharmacy Chemotherapy calculation:    Regimen: Mitomycin opthalmic- glaucoma surgery  Efficacy and safety of mitomycin-C in primary trabeculectomy: five-year follow-up   Ophthalmology Volume 109, Issue 7, July 2002, Pages 4970-5009     LABS:  Not required    1. Mitomycin opthalmic 0.25mg/ml dispense 3ml to OR at time of opthalmic surgery on 4/23/19   No calculations required    Selene PalenciaD.

## 2019-04-23 NOTE — DISCHARGE INSTRUCTIONS
ACTIVITY: Rest and take it easy for the first 24 hours.  A responsible adult is recommended to remain with you during that time.  It is normal to feel sleepy.  We encourage you to not do anything that requires balance, judgment or coordination.    MILD FLU-LIKE SYMPTOMS ARE NORMAL. YOU MAY EXPERIENCE GENERALIZED MUSCLE ACHES, THROAT IRRITATION, HEADACHE AND/OR SOME NAUSEA.    FOR 24 HOURS DO NOT:  Drive, operate machinery or run household appliances.  Drink beer or alcoholic beverages.   Make important decisions or sign legal documents.    SPECIAL INSTRUCTIONS: *keep eye shield on until follow up tomorrow**    DIET: To avoid nausea, slowly advance diet as tolerated, avoiding spicy or greasy foods for the first day.  Add more substantial food to your diet according to your physician's instructions.  Babies can be fed formula or breast milk as soon as they are hungry.  INCREASE FLUIDS AND FIBER TO AVOID CONSTIPATION.    SURGICAL DRESSING/BATHING: *keep clean and dry**    FOLLOW-UP APPOINTMENT:  A follow-up appointment should be arranged with your doctor; call to schedule.    You should CALL YOUR PHYSICIAN if you develop:  Fever greater than 101 degrees F.  Pain not relieved by medication, or persistent nausea or vomiting.  Excessive bleeding (blood soaking through dressing) or unexpected drainage from the wound.  Extreme redness or swelling around the incision site, drainage of pus or foul smelling drainage.  Inability to urinate or empty your bladder within 8 hours.  Problems with breathing or chest pain.    You should call 911 if you develop problems with breathing or chest pain.  If you are unable to contact your doctor or surgical center, you should go to the nearest emergency room or urgent care center.    Physician's telephone #: **Dr. Singh [phone]*    If any questions arise, call your doctor.  If your doctor is not available, please feel free to call the Surgical Center at [phone].  The Center is  open Monday through Friday from 7AM to 7PM.  You can also call the HEALTH HOTLINE open 24 hours/day, 7 days/week and speak to a nurse at [phone], or toll free at [phone].    A registered nurse may call you a few days after your surgery to see how you are doing after your procedure.    MEDICATIONS: Resume taking daily medication.  Take prescribed pain medication with food.  If no medication is prescribed, you may take non-aspirin pain medication if needed.  PAIN MEDICATION CAN BE VERY CONSTIPATING.  Take a stool softener or laxative such as senokot, pericolace, or milk of magnesia if needed.    Prescription given for *May take tylenol or advil if needed for pain**.  Last pain medication given at ***.    If your physician has prescribed pain medication that includes Acetaminophen (Tylenol), do not take additional Acetaminophen (Tylenol) while taking the prescribed medication.    Depression / Suicide Risk    As you are discharged from this [facility] facility, it is important to learn how to keep safe from harming yourself.    Recognize the warning signs:  · Abrupt changes in personality, positive or negative- including increase in energy   · Giving away possessions  · Change in eating patterns- significant weight changes-  positive or negative  · Change in sleeping patterns- unable to sleep or sleeping all the time   · Unwillingness or inability to communicate  · Depression  · Unusual sadness, discouragement and loneliness  · Talk of wanting to die  · Neglect of personal appearance   · Rebelliousness- reckless behavior  · Withdrawal from people/activities they love  · Confusion- inability to concentrate     If you or a loved one observes any of these behaviors or has concerns about self-harm, here's what you can do:  · Talk about it- your feelings and reasons for harming yourself  · Remove any means that you might use to hurt yourself (examples: pills, rope, extension cords, firearm)  · Get  professional help from the community (Mental Health, Substance Abuse, psychological counseling)  · Do not be alone:Call your Safe Contact- someone whom you trust who will be there for you.  · Call your local CRISIS HOTLINE 183-7162 or 987-051-4587  · Call your local Children's Mobile Crisis Response Team [place] (718) 103-8560 or www.[employer]  · Call the toll free National Suicide Prevention Hotlines   · National Suicide Prevention Lifeline 594-659-QDPE (4492)  [place] [facility] 800-SUICIDE (990-5150)Instrucciones Para [place]  (Home Care Instructions)    ACTIVIDAD: Descanse y tome todo con mucha calma las primeras 24 horas después de haq cirugía.  Maddie persona adulta responsable debe permanecer con usted fanta stanley periodo de tiempo.  Es normal sentirse sonoliento o sonolienta fanta esas primeras horas.  Le recomendamos que no suad nada que requiera equilibrio, ayden decisiones a mucha coordinación de haq parte.    NO SUAD ESTO PURANTE LAS PRIMERAS 24 HORAS:   Manejar o conducir algún vehiculo, operar maquinarias o utilizar electrodomesticos.   Beber cerveza o algún otro tipo de bebida alcohólica.   Ayden decisiones importantes o firmar documentos legales.    INSTRUCCIONES ESPECIALES: ***    DIETA: Para evitar las nauseas, prosiga despacito con haq dieta a medida que pueda ir tolerándola mejor, evite comidas muy condimentadas o grasosas fanta stanley primer día.  Vaya agregando comidas más substanciadas a haq dieta a medida que asi lo indique haq médica.  Los bebés pueden beber leche preparada o formula, ásl brittany también leche del seno de la madre a medida que vayan teniendo hambre.  SIGA AGREGANDO LIQUIDOS Y COMIDAS CON FIBRA PARA EVITAR ESTREÑIMIENTO.    BRITTANY BAÑARSE Y CAMBIAR LOS VENDAJES DE LA CIRUGIA: ***    MEDICAMENTOS/MEDICINAS:  Vuelva a ayden rita medicamentos diarios.  [place] los medicamentos que se le prescribe con un poco de comida.  Si no le prescribe ningún tipo de medicamento,  entonces puede marcos medicinas para el dolor que no contienen aspirina, si las necesita.  LAS MEDICINAS PARA EL DOLOR PUEDEN ESTREÑIRLE MUCHO.  [place] un suavizante para el excremento o materia fecal (stool softener) o un laxativo peter por ejemplo: senokot, pericolase, o leche de magnesia, si lo necesita.    La prescripción la administro ***.  La ultima sosis de medicina para el dolor fue administrada ***.     Se debe hacer elisa consulta medica con el doctor en ***, Líame para hacer la roger.    Usted debe LIAMAR A HAQ MEDICO si tiene los siguientes síntomas:   -   Elisa fiebre más rogers de 101 grados Fahrenheit.   -   Un dolor incesante aún con los medicamentos, o nauseas y vómito persistente.   -   Un sangrado excesivo (clayton que traspasa los vendajes o gasas) o algúln tipo de drenaje inesperado que proviene de la henda.     -   Un color walker exagerado o hinchazón alrededor del área en donde se le hizo incisión o timbo, o un drenaje de pus o con olor stephanie proveniente de la henda.   -    La inhabilidad de orinar o vaciar haq vejiga en 8 horas.   -    Problemas con a respiración o agustín en el pecho.    Usted debe llamar al 911 si se presentan problemas con el dolor al respirar o el pecho.  Si no se puede ponnoer en comunicación con un medica o con el centro de cirugía, usted debe ir a la estación de emergencia (emergency room) más cercana o a un centro de atención de urgencia (urgent care center).  El teléfono del medico es: ***    LOS SÍNTOMAS DE UN LEVE RESFRIO SON MUY NORMALES.  ADEMÁS USTED PUEDE LLEGAR A SENTIR AGUSTÍN GENERALES DE MÚSCULOS, IRRITACIÓN EN LA GARGANTA, AGUSTÍN DE TARIK Y/O UN POCO DE NAUSEAS.    Sie tiene alguna pregunta, llame a haq médico.  Si haq médico no se encuentra disponible, por favor llame al Centro de Cirugía at {Surgical Dept Numbers:[id number]}.  el Centro está abierto de Lunes a Viernes desde las 7:00 de la manana hasta las 7:00 de la noche.  Usted también puede llamar al [facility]ENTRO DE LLAMADAS  SOBRE LA CHARLIE o HEALTH HOTLINE.  Sara está abierto viente y cuatro horas por delbert, siete dhaliwal por semana, allí podrá hablar con elisa enfermera.  Llame al [phone], o al número aakash 0 [phone].    Mi firma a continuación indica que he recibido y entiendco estas instrucciones acera de los cuidados en la casa (Home Care Instructions)    · Carlyn recibirá elisa encuesta en la correspondencia en las siguientes semanas y le pedimos que por favor tome un momento para completar juan c encuesta y regresaría a hosotros.  Nuestro objetivó es brindarle un cuidado muy cabrera y par lo tanto apreciamos rita coméntanos.  Muchas tj por ashly escogido el Centro de Cirugía de [facility].

## 2019-04-23 NOTE — ANESTHESIA TIME REPORT
Anesthesia Start and Stop Event Times     Date Time Event    4/23/2019 1330 Anesthesia Start     1420 Anesthesia Stop        Responsible Staff  04/23/19    Name Role Begin End    Claudy Cooney M.D. Anesth 1330 1420        Preop Diagnosis (Free Text):  Pre-op Diagnosis     PTERYGIUM RIGHT EYE         Preop Diagnosis (Codes):  Diagnosis Information     Diagnosis Code(s):         Post op Diagnosis  Pterygium eye, right      Premium Reason  Non-Premium    Comments:

## 2019-04-23 NOTE — PROGRESS NOTES
"Pharmacy chemotherapy verification:     Protocol: opthalmic mitomycin    Mitomycin 0.2 mg/ml IO, during surgery to be administered by the MD  MD dosing of 0.25 mg/ml    *Dosing Reference*   Ramón Bhatt, et al. A randomised trial comparing 0.02% mitomycin C and limbal conjunctival autograft after excision of primary pterygium. Br J Ophthalmol. 2004 Aug; 88(8): 995-997     Dx: Pterygium     Allergies: Pcn   Ht 1.803 m (5' 11\")   Wt 97.5 kg (214 lb 15.2 oz)   BMI 29.98 kg/m²      Body surface area is 2.21 meters squared.      Drug Order   (Drug name, dose, route, IV Fluid & volume, frequency, number of doses)     Medication = mitomycin   Base Dose= 0.25 mg/ml fixed dose   No calculation required   Final Dose = 0.75 mg in 3 mls   Route = topical for the eye  Fluid & Volume = syringe 3 mL sterile water  Admin Duration = by MD at time of surgical procedure            By my signature below, I confirm this process was performed independently with the BSA and all final chemotherapy dosing calculations congruent. I have reviewed the above chemotherapy order and that my calculation of the final dose and BSA (when applicable) corroborate those calculations of the  pharmacist. Discrepancies of 5% or greater have been addressed and documented within the [facility] Progress notes.     SCARLET Chamberlain, PharmD        "

## 2019-04-23 NOTE — OP REPORT
DATE OF SERVICE:  04/23/2019    PREOPERATIVE DIAGNOSIS:  Pterygium, right eye.    POSTOPERATIVE DIAGNOSIS:  Pterygium, right eye.    PROCEDURE:  Pterygium excision with amniotic tissue graft, right eye.    SURGEON:  Jose Singh MD    ANESTHESIA:  Local MAC.    PROSTHETIC DEVICES:  None.    INDICATIONS:  The patient has a large nasal pterygium causing ocular   irritation and involving the visual axis.  Following discussion of the risks   and benefits of surgery including the potential for recurrence, the decision   was made to proceed with elective cataract surgery using an amniotic tissue   graft.    DESCRIPTION OF PROCEDURE:  The patient was taken to the operating room and   placed in the supine position on the operating room table.  Appropriate   anesthetic monitors were positioned.  Anesthesia was accomplished with topical   tetracaine eye drops.  The face was prepared and draped in the usual sterile   fashion for ocular surgery using Betadine solution.  A wire lid speculum was   positioned for exposure.  Anesthesia was augmented with a local injection of   2% lidocaine with epinephrine beneath the body of the pterygium.  The body of   the pterygium was excised with Mayra scissors and avulsed free from the   cornea.  The cornea was polished with a corneal bur.  A surgical sponge   supersaturated with mitomycin in a concentration of 0.25 mg/mL was applied to   the bare sclera peripheral to the excisional biopsy for exactly 2 minutes.    The ocular surface was then copiously irrigated with BSS.  An amniotic tissue   graft (AmnioGraft) was applied to the bare sclera using Tisseel tissue glue.    A subconjunctival injection of dexamethasone was given inferiorly upon   completion of the surgery.  The eye was dressed with TobraDex ophthalmic   ointment and a pressure patch.  The patient was taken to the recovery room in   stable condition.       ____________________________________     JOSE SINGH  MD    CAM / NATALIE    DD:  04/23/2019 14:56:58  DT:  04/23/2019 15:44:07    D#:  [id number]  Job#:  [id number]

## 2019-04-23 NOTE — ANESTHESIA POSTPROCEDURE EVALUATION
Patient: Nadeem Araujo    Procedure Summary     Date:  04/23/19 Room / Location:  [facility] ROOM 27 / SURGERY SAME DAY [facility]    Anesthesia Start:  1330 Anesthesia Stop:  1420    Procedure:  EXCISION, PTERYGIUM WITH CONJUCTIVAL OUTOGRAFT (Right Eye) Diagnosis:  (PTERYGIUM RIGHT EYE )    Surgeon:  Jose Singh M.D. Responsible Provider:  Claudy Cooney M.D.    Anesthesia Type:  MAC ASA Status:  2          Final Anesthesia Type: MAC  Last vitals  BP   NIBP: 142/91    Temp   37.7 °C (99.9 °F)    Pulse   Pulse: 72   Resp   16    SpO2   97 %      Anesthesia Post Evaluation    Patient location during evaluation: PACU  Patient participation: complete - patient participated  Level of consciousness: awake and alert  Pain score: 0    Airway patency: patent  Anesthetic complications: no  Cardiovascular status: hemodynamically stable  Respiratory status: acceptable  Hydration status: euvolemic    PONV: none           Nurse Pain Score: 0 (NPRS)

## 2019-04-27 ENCOUNTER — HOSPITAL ENCOUNTER (EMERGENCY)
Facility: MEDICAL CENTER | Age: 44
End: 2019-04-28
Attending: EMERGENCY MEDICINE
Payer: COMMERCIAL

## 2019-04-27 DIAGNOSIS — R10.13 EPIGASTRIC ABDOMINAL PAIN: ICD-10-CM

## 2019-04-27 LAB
ALBUMIN SERPL BCP-MCNC: 5 G/DL (ref 3.2–4.9)
ALBUMIN/GLOB SERPL: 1.7 G/DL
ALP SERPL-CCNC: 79 U/L (ref 30–99)
ALT SERPL-CCNC: 37 U/L (ref 2–50)
ANION GAP SERPL CALC-SCNC: 9 MMOL/L (ref 0–11.9)
AST SERPL-CCNC: 26 U/L (ref 12–45)
BASOPHILS # BLD AUTO: 0 % (ref 0–1.8)
BASOPHILS # BLD: 0 K/UL (ref 0–0.12)
BILIRUB SERPL-MCNC: 0.4 MG/DL (ref 0.1–1.5)
BUN SERPL-MCNC: 18 MG/DL (ref 8–22)
CALCIUM SERPL-MCNC: 9.9 MG/DL (ref 8.5–10.5)
CHLORIDE SERPL-SCNC: 100 MMOL/L (ref 96–112)
CO2 SERPL-SCNC: 28 MMOL/L (ref 20–33)
CREAT SERPL-MCNC: 0.93 MG/DL (ref 0.5–1.4)
EKG IMPRESSION: NORMAL
EOSINOPHIL # BLD AUTO: 1.51 K/UL (ref 0–0.51)
EOSINOPHIL NFR BLD: 13.1 % (ref 0–6.9)
ERYTHROCYTE [DISTWIDTH] IN BLOOD BY AUTOMATED COUNT: 40.7 FL (ref 35.9–50)
ETHANOL BLD-MCNC: 0 G/DL
GLOBULIN SER CALC-MCNC: 2.9 G/DL (ref 1.9–3.5)
GLUCOSE SERPL-MCNC: 118 MG/DL (ref 65–99)
HCT VFR BLD AUTO: 47 % (ref 42–52)
HGB BLD-MCNC: 15.7 G/DL (ref 14–18)
LG PLATELETS BLD QL SMEAR: NORMAL
LIPASE SERPL-CCNC: 24 U/L (ref 11–82)
LYMPHOCYTES # BLD AUTO: 3.6 K/UL (ref 1–4.8)
LYMPHOCYTES NFR BLD: 31.3 % (ref 22–41)
MANUAL DIFF BLD: NORMAL
MCH RBC QN AUTO: 30.3 PG (ref 27–33)
MCHC RBC AUTO-ENTMCNC: 33.4 G/DL (ref 33.7–35.3)
MCV RBC AUTO: 90.7 FL (ref 81.4–97.8)
MONOCYTES # BLD AUTO: 0.9 K/UL (ref 0–0.85)
MONOCYTES NFR BLD AUTO: 7.8 % (ref 0–13.4)
MORPHOLOGY BLD-IMP: NORMAL
NEUTROPHILS # BLD AUTO: 5.5 K/UL (ref 1.82–7.42)
NEUTROPHILS NFR BLD: 47.8 % (ref 44–72)
NRBC # BLD AUTO: 0 K/UL
NRBC BLD-RTO: 0 /100 WBC
PLATELET # BLD AUTO: 252 K/UL (ref 164–446)
PLATELET BLD QL SMEAR: NORMAL
PMV BLD AUTO: 10.3 FL (ref 9–12.9)
POTASSIUM SERPL-SCNC: 3.8 MMOL/L (ref 3.6–5.5)
PROT SERPL-MCNC: 7.9 G/DL (ref 6–8.2)
RBC # BLD AUTO: 5.18 M/UL (ref 4.7–6.1)
RBC BLD AUTO: PRESENT
SODIUM SERPL-SCNC: 137 MMOL/L (ref 135–145)
WBC # BLD AUTO: 11.5 K/UL (ref 4.8–10.8)

## 2019-04-27 PROCEDURE — 93005 ELECTROCARDIOGRAM TRACING: CPT

## 2019-04-27 PROCEDURE — 85007 BL SMEAR W/DIFF WBC COUNT: CPT

## 2019-04-27 PROCEDURE — 85027 COMPLETE CBC AUTOMATED: CPT

## 2019-04-27 PROCEDURE — 80307 DRUG TEST PRSMV CHEM ANLYZR: CPT

## 2019-04-27 PROCEDURE — 99284 EMERGENCY DEPT VISIT MOD MDM: CPT

## 2019-04-27 PROCEDURE — 80053 COMPREHEN METABOLIC PANEL: CPT

## 2019-04-27 PROCEDURE — 83690 ASSAY OF LIPASE: CPT

## 2019-04-27 PROCEDURE — 93005 ELECTROCARDIOGRAM TRACING: CPT | Performed by: EMERGENCY MEDICINE

## 2019-04-27 PROCEDURE — 700102 HCHG RX REV CODE 250 W/ 637 OVERRIDE(OP): Performed by: EMERGENCY MEDICINE

## 2019-04-27 PROCEDURE — A9270 NON-COVERED ITEM OR SERVICE: HCPCS | Performed by: EMERGENCY MEDICINE

## 2019-04-27 RX ADMIN — LIDOCAINE HYDROCHLORIDE 30 ML: 20 SOLUTION OROPHARYNGEAL at 22:49

## 2019-04-28 VITALS
HEART RATE: 64 BPM | HEIGHT: 70 IN | SYSTOLIC BLOOD PRESSURE: 160 MMHG | WEIGHT: 212.74 LBS | RESPIRATION RATE: 16 BRPM | TEMPERATURE: 96.8 F | BODY MASS INDEX: 30.46 KG/M2 | OXYGEN SATURATION: 96 % | DIASTOLIC BLOOD PRESSURE: 101 MMHG

## 2019-04-28 NOTE — ED NOTES
Nadeem Araujo discharged via ambulatory with steady gait.  Discharge instructions given and reviewed, patient educated to follow up with PCP, verbalized understanding.  Prescriptions given x 1.  All personal belongings in possession.  No questions at this time.

## 2019-04-28 NOTE — DISCHARGE INSTRUCTIONS
Tus análisis de clayton fueron normales. Aviva síntomas son probablemente causados por el reflujo ácido. Por favor, use el medicamento antiácido que le hemos recetado fanta las próximas semanas. Evite los alimentos grasos o picantes.    (Your blood tests were all normal. Your symptoms are probably caused by acid reflux. Please use the antacid medication we've prescribed for the next few weeks. Avoid fatty or spicy foods.)

## 2019-04-28 NOTE — ED PROVIDER NOTES
"ED Provider Note    Scribed for Didier Lopez M.D. by Didier Lopez. 4/27/2019,  10:38 PM.    CHIEF COMPLAINT  Chief Complaint   Patient presents with   • Epigastric Pain     started yesterday, got worse today       HPI  Nadeem Araujo is a 44 y.o. male with a history of acid reflux who presents to the Emergency Department for epigastric pain that started a day or 2 ago with mild intermittent episodes, and got worse today.  At the bedside now, he has no pain or tenderness.  I had ordered a GI cocktail prior to evaluating the patient which he received before I was able to see him.  He denies fevers or chills or nausea or vomiting or chest pain or shortness of breath.  He says the pain does seem to get a little bit worse after eating certain things.  He has not had dysuria.  He has no history of abdominal surgeries.    REVIEW OF SYSTEMS  See HPI for further details. All other systems are negative.     PAST MEDICAL HISTORY   has a past medical history of GERD (gastroesophageal reflux disease) and Hypertension.    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    CURRENT MEDICATIONS  Home Medications     Reviewed by Vel Alcala R.N. (Registered Nurse) on 04/27/19 at 2210  Med List Status: Complete   Medication Last Dose Status   lisinopril (PRINIVIL) 10 MG Tab  Active   tobramycin-dexamethasone (TOBRADEX) 0.3-0.1 % Ointment  Active                ALLERGIES  No Known Allergies    PHYSICAL EXAM  VITAL SIGNS: /101   Pulse 60   Temp 36 °C (96.8 °F) (Temporal)   Resp 16   Ht 1.778 m (5' 10\")   Wt 96.5 kg (212 lb 11.9 oz)   SpO2 96%   BMI 30.53 kg/m² "   Pulse ox interpretation: I interpret this pulse ox as normal.  Constitutional: Alert in no apparent distress.  HENT: No signs of trauma, Bilateral external ears normal, Nose normal.   Eyes: Conjunctiva normal, Non-icteric.   Neck: Normal range of motion, Supple, No stridor.   Lymphatic: No lymphadenopathy noted.   Cardiovascular: Regular rate and rhythm, no murmurs.   Thorax & Lungs: Normal breath sounds, No respiratory distress, No wheezing, No chest tenderness.   Abdomen: Bowel sounds normal, Soft, No tenderness, No masses, No pulsatile masses. No peritoneal signs.  Skin: Warm, Dry, No erythema, No rash.   Extremities: Intact distal pulses, No edema, No cyanosis.  Musculoskeletal: Good range of motion in all major joints. No or major deformities noted.   Neurologic: Alert , Normal motor function, Normal sensory function, No focal deficits noted.   Psychiatric: Affect normal, Judgment normal, Mood normal.     DIAGNOSTIC STUDIES / PROCEDURES    EKG  Interpreted by me    Rhythm:  Normal sinus rhythm   CONSIDER LEFT ATRIAL ABNORMALITY  LEFT VENTRICULAR HYPERTROPHY  No previous ECG available for comparison    LABS  Labs Reviewed   CBC WITH DIFFERENTIAL - Abnormal; Notable for the following:        Result Value    WBC 11.5 (*)     MCHC 33.4 (*)     Eosinophils 13.10 (*)     Monos (Absolute) 0.90 (*)     Eos (Absolute) 1.51 (*)     All other components within normal limits   COMP METABOLIC PANEL - Abnormal; Notable for the following:     Glucose 118 (*)     Albumin 5.0 (*)     All other components within normal limits   LIPASE   DIAGNOSTIC ALCOHOL   DIFFERENTIAL MANUAL   PERIPHERAL SMEAR REVIEW   PLATELET ESTIMATE   MORPHOLOGY   ESTIMATED GFR     All labs reviewed by me.    RADIOLOGY  No orders to display     The radiologist's interpretation of all radiological studies have been reviewed by me.    COURSE & MEDICAL DECISION MAKING  Nursing notes, VS, PMSFHx reviewed in chart.     10:38 PM Patient seen and examined at  bedside.  He has a history of GERD.  He is asymptomatic after a GI cocktail.   I am not suspicious for acute coronary syndrome.  His symptoms seem distinctly GI in nature.  We are checking labs to exclude hepatobiliary disease, pancreatitis, or any other signs of infection.     11:59 PM this patient's labs were all normal.  There is no evidence of hepatobiliary disease or pancreatitis.  He is completely asymptomatic after a GI cocktail, and has a history of GERD, so at this point the most likely diagnosis by far seems to be acid reflux.  I will start him on a 30-day course of antacid and recommend primary care follow-up with his established doctor.       The patient will return for new or worsening symptoms and is stable at the time of discharge.    The patient is referred to a primary physician for blood pressure management, diabetic screening, and for all other preventative health concerns.    DISPOSITION:  Patient will be discharged home in stable condition.    FOLLOW UP:  Ina Dubois M.D.  [address] [facility] [id number] [phone]    Schedule an appointment as soon as possible for a visit         OUTPATIENT MEDICATIONS:  New Prescriptions    No medications on file         FINAL IMPRESSION  1. Epigastric abdominal pain

## 2019-04-28 NOTE — ED TRIAGE NOTES
Nadeem Araujo  44 y.o.  Chief Complaint   Patient presents with   • Epigastric Pain     started yesterday, got worse today     Patient ambulatory with steady gait to triage room with above complaint.  Patient appears mild discomfort.  Denies n/v/d or urinary symptoms.  Had eye surgery a few days ago.      Patient escorted to the lobby and instructed to notify staff of any changes in condition.

## 2019-05-14 ENCOUNTER — OFFICE VISIT (OUTPATIENT)
Dept: MEDICAL GROUP | Facility: PHYSICIAN GROUP | Age: 44
End: 2019-05-14
Payer: COMMERCIAL

## 2019-05-14 VITALS
DIASTOLIC BLOOD PRESSURE: 84 MMHG | WEIGHT: 212 LBS | BODY MASS INDEX: 29.68 KG/M2 | SYSTOLIC BLOOD PRESSURE: 122 MMHG | HEIGHT: 71 IN | TEMPERATURE: 98.8 F | HEART RATE: 79 BPM | OXYGEN SATURATION: 96 %

## 2019-05-14 DIAGNOSIS — K21.9 GASTROESOPHAGEAL REFLUX DISEASE WITHOUT ESOPHAGITIS: ICD-10-CM

## 2019-05-14 DIAGNOSIS — I10 ESSENTIAL HYPERTENSION: ICD-10-CM

## 2019-05-14 DIAGNOSIS — E78.2 MIXED HYPERLIPIDEMIA: ICD-10-CM

## 2019-05-14 PROBLEM — R05.9 COUGH: Status: RESOLVED | Noted: 2019-03-05 | Resolved: 2019-05-14

## 2019-05-14 PROCEDURE — 99214 OFFICE O/P EST MOD 30 MIN: CPT | Performed by: FAMILY MEDICINE

## 2019-05-14 RX ORDER — OMEPRAZOLE 20 MG/1
20 CAPSULE, DELAYED RELEASE ORAL DAILY
Qty: 90 CAP | Refills: 1 | Status: SHIPPED | OUTPATIENT
Start: 2019-05-14 | End: 2019-09-19 | Stop reason: SDUPTHER

## 2019-05-14 NOTE — PROGRESS NOTES
"cc: GERD    Subjective:     Nadeem Araujo is a 44 y.o. male presenting for ER follow-up for his GERD.  In the interim he has also had his right pterygium removed by Dr. Jose Singh.  He has a follow-up appointment with him on May 22.    1. Gastroesophageal reflux disease without esophagitis  Chronic medical problem for which he was seen in emergency room on April 27.  At that time he presented with epigastric pain with no radiation, no nausea vomiting.  He was previously taking Nexium but had been off of that for some time and had been trying to monitor his GERD with dietary changes.  Since discharge from the emergency room he has been taking omeprazole 20 mg every morning with resolution of his pain.    2. Essential hypertension  Chronic medical problem for which he continues to take lisinopril 10 mg daily.  Blood pressure is well controlled today at 122/84.  Denies any headaches, chest pain or palpitations.    3. Mixed hyperlipidemia  Chronic medical problem for which he is currently not on any medications.  His labs from November 2018 were elevated and he has been trying diet and lifestyle modifications.  He is due to have his repeat labs drawn.      Review of systems:  See above and negative for chest pain, SOB, fevers, chills,   No Known Allergies      Current Outpatient Prescriptions:   •  omeprazole (PRILOSEC) 20 MG delayed-release capsule, Take 1 Cap by mouth every day., Disp: 90 Cap, Rfl: 1  •  lisinopril (PRINIVIL) 10 MG Tab, Take 1 Tab by mouth every day., Disp: 90 Tab, Rfl: 3  •  tobramycin-dexamethasone (TOBRADEX) 0.3-0.1 % Ointment, Place  in right eye every 4 hours., Disp: , Rfl:     Allergies, past medical history, past surgical history, family history, social history reviewed and updated    Objective:     Vitals: /84 (BP Location: Left arm, Patient Position: Sitting, BP Cuff Size: Adult)   Pulse 79   Temp 37.1 °C (98.8 °F) (Temporal)   Ht 1.803 m (5' 11\")   Wt 96.2 kg (212 lb)   " SpO2 96%   BMI 29.57 kg/m²   General:  Alert, pleasant, NAD  Eyes: left pterygium,  normal inspection of conjunctivae and lids, EOMI,   ENMT:  External ears and nose are normal.    Neck  supple,   Heart:  Regular rate and rhythm,  No LE edema  Respiratory:  Normal respiratory effort, Clear to auscultation bilaterally.  Abdomen:   soft, Non-distended,   Skin:  Warm, dry, no rashes,   Musculoskeletal:  Normal gait, Normal digits and nails.  Neurological: No tremors,   Psych:   Affect/mood is normal, judgement is good, memory is intact, grooming is appropriate.    Assessment/Plan:     Nadeem was seen today for abdominal pain.    Diagnoses and all orders for this visit:    Gastroesophageal reflux disease without esophagitis  Chronic medical problem.  Improving with omeprazole  -     omeprazole (PRILOSEC) 20 MG delayed-release capsule; Take 1 Cap by mouth every day.    Essential hypertension  Chronic medical problem.  Stable with lisinopril 10 mg daily    Mixed hyperlipidemia  Chronic medical problem.  Due for repeat labs.  Encouraged to get these done in the next 2 weeks.  Currently not on any medications and just doing lifestyle modifications.        Return in about 3 months (around 8/14/2019).

## 2019-07-08 ENCOUNTER — TELEPHONE (OUTPATIENT)
Dept: MEDICAL GROUP | Facility: PHYSICIAN GROUP | Age: 44
End: 2019-07-08

## 2019-07-08 NOTE — TELEPHONE ENCOUNTER
----- Message from Ina Dubois M.D. sent at 7/8/2019 12:49 PM PDT -----  Please call patient and let him know in [place] that his cholesterol labs do show some improvement.  I will discuss these further with him at his upcoming appt with me in August.    Thanks,  Ina Dubois M.D.

## 2019-07-08 NOTE — TELEPHONE ENCOUNTER
Phone Number Called: [phone] (home)     Call outcome: left message for patient to call back regarding message below    Message: lab results will be given on next appointment in August

## 2019-08-14 ENCOUNTER — OFFICE VISIT (OUTPATIENT)
Dept: MEDICAL GROUP | Facility: PHYSICIAN GROUP | Age: 44
End: 2019-08-14
Payer: COMMERCIAL

## 2019-08-14 VITALS
HEART RATE: 70 BPM | HEIGHT: 70 IN | SYSTOLIC BLOOD PRESSURE: 128 MMHG | WEIGHT: 200 LBS | DIASTOLIC BLOOD PRESSURE: 78 MMHG | BODY MASS INDEX: 28.63 KG/M2 | OXYGEN SATURATION: 95 % | RESPIRATION RATE: 16 BRPM | TEMPERATURE: 98.4 F

## 2019-08-14 DIAGNOSIS — I10 ESSENTIAL HYPERTENSION: ICD-10-CM

## 2019-08-14 DIAGNOSIS — K21.9 GASTROESOPHAGEAL REFLUX DISEASE WITHOUT ESOPHAGITIS: ICD-10-CM

## 2019-08-14 DIAGNOSIS — E78.2 MIXED HYPERLIPIDEMIA: ICD-10-CM

## 2019-08-14 PROBLEM — H11.002 PTERYGIUM OF LEFT EYE: Status: ACTIVE | Noted: 2018-11-20

## 2019-08-14 PROCEDURE — 99214 OFFICE O/P EST MOD 30 MIN: CPT | Performed by: FAMILY MEDICINE

## 2019-08-14 RX ORDER — DUREZOL 0.5 MG/ML
EMULSION OPHTHALMIC
Refills: 3 | COMMUNITY
Start: 2019-07-09 | End: 2020-02-12

## 2019-08-14 SDOH — HEALTH STABILITY: MENTAL HEALTH: HOW OFTEN DO YOU HAVE A DRINK CONTAINING ALCOHOL?: MONTHLY OR LESS

## 2019-08-14 SDOH — HEALTH STABILITY: MENTAL HEALTH: HOW MANY STANDARD DRINKS CONTAINING ALCOHOL DO YOU HAVE ON A TYPICAL DAY?: 1 OR 2

## 2019-08-14 SDOH — HEALTH STABILITY: MENTAL HEALTH: HOW OFTEN DO YOU HAVE 6 OR MORE DRINKS ON ONE OCCASION?: NEVER

## 2019-08-15 NOTE — PROGRESS NOTES
"cc: Hypertension    Subjective:     Nadeem Araujo is a 44 y.o. male presenting to further discuss his hypertension.    1. Essential hypertension  Chronic medical problem.  Blood pressure today is 128/78.  Currently taking lisinopril 10 mg daily.  Any headaches or chest pain.  Denies any cough.  Has lost 12 pounds over the last 3 months by monitoring his diet.    2. Gastroesophageal reflux disease without esophagitis  Chronic medical diagnosis.  Currently taking omeprazole 20 mg daily.    3. Mixed hyperlipidemia  Chronic medical problem.  Currently not on any medications.  Continues to work on diet.  Recent labs from July do show an improved cholesterol level of 196 and LDL of 133.      Review of systems:  See above and negative for fever chills, abdominal pain.  No Known Allergies      Current Outpatient Medications:   •  DUREZOL 0.05 % Emulsion, 1 DROP RIGHT EYE TWICE PER DAY, Disp: , Rfl: 3  •  omeprazole (PRILOSEC) 20 MG delayed-release capsule, Take 1 Cap by mouth every day., Disp: 90 Cap, Rfl: 1  •  lisinopril (PRINIVIL) 10 MG Tab, Take 1 Tab by mouth every day., Disp: 90 Tab, Rfl: 3    Allergies, past medical history, past surgical history, family history, social history reviewed and updated    Objective:     Vitals: /78 (BP Location: Left arm, Patient Position: Sitting, BP Cuff Size: Adult)   Pulse 70   Temp 36.9 °C (98.4 °F) (Temporal)   Resp 16   Ht 1.778 m (5' 10\")   Wt 90.7 kg (200 lb)   SpO2 95%   BMI 28.70 kg/m²   General:  Alert, pleasant, NAD  Eyes:  normal inspection of conjunctivae and lids, EOMI,   ENMT:  External ears and nose are normal.    Neck  supple,   Heart:  Regular rate and rhythm,  No LE edema  Respiratory:  Normal respiratory effort, Clear to auscultation bilaterally.  Abdomen:   soft, Non-distended,   Skin:  Warm, dry, no rashes,   Musculoskeletal:  Normal gait, Normal digits and nails.  Neurological: No tremors,   Psych:   Affect/mood is normal, judgement is good, " memory is intact, grooming is appropriate.    Assessment/Plan:     Nadeem was seen today for follow-up.    Diagnoses and all orders for this visit:    Essential hypertension  Chronic medical problem.  Stable.  Continue with lisinopril 10 mg daily.  -     Comp Metabolic Panel; Future    Gastroesophageal reflux disease without esophagitis  Chronic medical problem.  Stable.    Mixed hyperlipidemia  Chronic medical problem.  Improving.  We will continue to monitor.  Follow-up with me in 6 months and labs prior to the appointment.-     Lipid Profile; Future          Return in about 6 months (around 2/14/2020) for hyperlipidemia.

## 2019-09-19 DIAGNOSIS — K21.9 GASTROESOPHAGEAL REFLUX DISEASE WITHOUT ESOPHAGITIS: ICD-10-CM

## 2019-09-19 RX ORDER — OMEPRAZOLE 20 MG/1
20 CAPSULE, DELAYED RELEASE ORAL DAILY
Qty: 90 CAP | Refills: 3 | Status: SHIPPED | OUTPATIENT
Start: 2019-09-19 | End: 2020-08-12 | Stop reason: SDUPTHER

## 2019-09-19 NOTE — TELEPHONE ENCOUNTER
Was the patient seen in the last year in this department? Yes LOV 08/14/19    Does patient have an active prescription for medications requested? No     Received Request Via: Pharmacy

## 2019-09-19 NOTE — TELEPHONE ENCOUNTER
Requested Prescriptions     Pending Prescriptions Disp Refills   • omeprazole (PRILOSEC) 20 MG delayed-release capsule 90 Cap 3     Sig: Take 1 Cap by mouth every day.   Ina Dubois M.D.

## 2019-09-24 ENCOUNTER — ANESTHESIA EVENT (OUTPATIENT)
Dept: SURGERY | Facility: MEDICAL CENTER | Age: 44
End: 2019-09-24
Payer: COMMERCIAL

## 2019-09-24 ENCOUNTER — HOSPITAL ENCOUNTER (OUTPATIENT)
Facility: MEDICAL CENTER | Age: 44
End: 2019-09-24
Attending: OPHTHALMOLOGY | Admitting: OPHTHALMOLOGY
Payer: COMMERCIAL

## 2019-09-24 ENCOUNTER — ANESTHESIA (OUTPATIENT)
Dept: SURGERY | Facility: MEDICAL CENTER | Age: 44
End: 2019-09-24
Payer: COMMERCIAL

## 2019-09-24 VITALS
BODY MASS INDEX: 28.63 KG/M2 | WEIGHT: 199.96 LBS | OXYGEN SATURATION: 97 % | DIASTOLIC BLOOD PRESSURE: 84 MMHG | HEIGHT: 70 IN | TEMPERATURE: 97.4 F | SYSTOLIC BLOOD PRESSURE: 133 MMHG | RESPIRATION RATE: 16 BRPM | HEART RATE: 57 BPM

## 2019-09-24 PROCEDURE — 160041 HCHG SURGERY MINUTES - EA ADDL 1 MIN LEVEL 4: Performed by: OPHTHALMOLOGY

## 2019-09-24 PROCEDURE — A6410 STERILE EYE PAD: HCPCS | Performed by: OPHTHALMOLOGY

## 2019-09-24 PROCEDURE — 160025 RECOVERY II MINUTES (STATS): Performed by: OPHTHALMOLOGY

## 2019-09-24 PROCEDURE — 700101 HCHG RX REV CODE 250: Performed by: OPHTHALMOLOGY

## 2019-09-24 PROCEDURE — 700105 HCHG RX REV CODE 258: Performed by: OPHTHALMOLOGY

## 2019-09-24 PROCEDURE — 160009 HCHG ANES TIME/MIN: Performed by: OPHTHALMOLOGY

## 2019-09-24 PROCEDURE — 160046 HCHG PACU - 1ST 60 MINS PHASE II: Performed by: OPHTHALMOLOGY

## 2019-09-24 PROCEDURE — 160029 HCHG SURGERY MINUTES - 1ST 30 MINS LEVEL 4: Performed by: OPHTHALMOLOGY

## 2019-09-24 PROCEDURE — 700111 HCHG RX REV CODE 636 W/ 250 OVERRIDE (IP): Performed by: ANESTHESIOLOGY

## 2019-09-24 PROCEDURE — 502000 HCHG MISC OR IMPLANTS RC 0278: Performed by: OPHTHALMOLOGY

## 2019-09-24 PROCEDURE — 110454 HCHG SHELL REV 250: Performed by: OPHTHALMOLOGY

## 2019-09-24 PROCEDURE — 700111 HCHG RX REV CODE 636 W/ 250 OVERRIDE (IP): Performed by: OPHTHALMOLOGY

## 2019-09-24 PROCEDURE — 160048 HCHG OR STATISTICAL LEVEL 1-5: Performed by: OPHTHALMOLOGY

## 2019-09-24 PROCEDURE — 160047 HCHG PACU  - EA ADDL 30 MINS PHASE II: Performed by: OPHTHALMOLOGY

## 2019-09-24 RX ORDER — TETRACAINE HYDROCHLORIDE 5 MG/ML
SOLUTION OPHTHALMIC
Status: DISCONTINUED
Start: 2019-09-24 | End: 2019-09-24 | Stop reason: HOSPADM

## 2019-09-24 RX ORDER — ONDANSETRON 2 MG/ML
4 INJECTION INTRAMUSCULAR; INTRAVENOUS
Status: DISCONTINUED | OUTPATIENT
Start: 2019-09-24 | End: 2019-09-24 | Stop reason: HOSPADM

## 2019-09-24 RX ORDER — MOXIFLOXACIN 5 MG/ML
SOLUTION/ DROPS OPHTHALMIC
Status: DISCONTINUED | OUTPATIENT
Start: 2019-09-24 | End: 2019-09-24 | Stop reason: HOSPADM

## 2019-09-24 RX ORDER — DEXAMETHASONE SODIUM PHOSPHATE 4 MG/ML
INJECTION, SOLUTION INTRA-ARTICULAR; INTRALESIONAL; INTRAMUSCULAR; INTRAVENOUS; SOFT TISSUE
Status: DISCONTINUED | OUTPATIENT
Start: 2019-09-24 | End: 2019-09-24 | Stop reason: HOSPADM

## 2019-09-24 RX ORDER — TETRACAINE HYDROCHLORIDE 5 MG/ML
SOLUTION OPHTHALMIC
Status: DISCONTINUED | OUTPATIENT
Start: 2019-09-24 | End: 2019-09-24 | Stop reason: HOSPADM

## 2019-09-24 RX ORDER — HALOPERIDOL 5 MG/ML
1 INJECTION INTRAMUSCULAR
Status: DISCONTINUED | OUTPATIENT
Start: 2019-09-24 | End: 2019-09-24 | Stop reason: HOSPADM

## 2019-09-24 RX ORDER — MIDAZOLAM HYDROCHLORIDE 1 MG/ML
INJECTION INTRAMUSCULAR; INTRAVENOUS
Status: COMPLETED
Start: 2019-09-24 | End: 2019-09-24

## 2019-09-24 RX ORDER — LABETALOL HYDROCHLORIDE 5 MG/ML
5 INJECTION, SOLUTION INTRAVENOUS
Status: DISCONTINUED | OUTPATIENT
Start: 2019-09-24 | End: 2019-09-24 | Stop reason: HOSPADM

## 2019-09-24 RX ORDER — LIDOCAINE HCL/EPINEPHRINE/PF 2%-1:200K
VIAL (ML) INJECTION
Status: DISCONTINUED | OUTPATIENT
Start: 2019-09-24 | End: 2019-09-24 | Stop reason: HOSPADM

## 2019-09-24 RX ORDER — SODIUM CHLORIDE, SODIUM LACTATE, POTASSIUM CHLORIDE, CALCIUM CHLORIDE 600; 310; 30; 20 MG/100ML; MG/100ML; MG/100ML; MG/100ML
INJECTION, SOLUTION INTRAVENOUS CONTINUOUS
Status: DISCONTINUED | OUTPATIENT
Start: 2019-09-24 | End: 2019-09-24 | Stop reason: HOSPADM

## 2019-09-24 RX ORDER — TOBRAMYCIN AND DEXAMETHASONE 3; 1 MG/ML; MG/ML
SUSPENSION/ DROPS OPHTHALMIC
Status: DISCONTINUED | OUTPATIENT
Start: 2019-09-24 | End: 2019-09-24 | Stop reason: HOSPADM

## 2019-09-24 RX ORDER — DIPHENHYDRAMINE HYDROCHLORIDE 50 MG/ML
12.5 INJECTION INTRAMUSCULAR; INTRAVENOUS
Status: DISCONTINUED | OUTPATIENT
Start: 2019-09-24 | End: 2019-09-24 | Stop reason: HOSPADM

## 2019-09-24 RX ORDER — BALANCED SALT SOLUTION 6.4; .75; .48; .3; 3.9; 1.7 MG/ML; MG/ML; MG/ML; MG/ML; MG/ML; MG/ML
SOLUTION OPHTHALMIC
Status: DISCONTINUED | OUTPATIENT
Start: 2019-09-24 | End: 2019-09-24 | Stop reason: HOSPADM

## 2019-09-24 RX ADMIN — SODIUM CHLORIDE, POTASSIUM CHLORIDE, SODIUM LACTATE AND CALCIUM CHLORIDE: 600; 310; 30; 20 INJECTION, SOLUTION INTRAVENOUS at 11:38

## 2019-09-24 RX ADMIN — FENTANYL CITRATE 50 MCG: 50 INJECTION, SOLUTION INTRAMUSCULAR; INTRAVENOUS at 12:28

## 2019-09-24 RX ADMIN — MIDAZOLAM HYDROCHLORIDE 0.5 MG: 1 INJECTION, SOLUTION INTRAMUSCULAR; INTRAVENOUS at 12:35

## 2019-09-24 RX ADMIN — MITOMYCIN 0.75 MG: 5 INJECTION, POWDER, LYOPHILIZED, FOR SOLUTION INTRAVENOUS at 12:42

## 2019-09-24 RX ADMIN — MIDAZOLAM HYDROCHLORIDE 1.5 MG: 1 INJECTION, SOLUTION INTRAMUSCULAR; INTRAVENOUS at 12:28

## 2019-09-24 ASSESSMENT — PAIN SCALES - GENERAL: PAIN_LEVEL: 0

## 2019-09-24 NOTE — PROGRESS NOTES
Pharmacy Chemotherapy calculation:    Regimen: Mitomycin opthalmic- glaucoma surgery  Efficacy and safety of mitomycin-C in primary trabeculectomy: five-year follow-up   Ophthalmology Volume 109, Issue 7, July 2002, Pages 3484-7040     LABS:  Not required    1. Mitomycin opthalmic 0.25mg/mL dispense 3mL(0.75mg) to OR at time of opthalmic surgery on 9/24/19   No calculations required      PARMINDER Roca Pharm.D.

## 2019-09-24 NOTE — ANESTHESIA QCDR
2019 [facility] Clinical Data Registry (for Quality Improvement)     Postoperative nausea/vomiting risk protocol (Adult = 18 yrs and Pediatric 3-17 yrs)- (430 and 463)  General inhalation anesthetic (NOT TIVA) with PONV risk factors: No  Provision of anti-emetic therapy with at least 2 different classes of agents: N/A  Patient DID NOT receive anti-emetic therapy and reason is documented in Medical Record: N/A    Multimodal Pain Management- (AQI59)  Patient undergoing Elective Surgery (i.e. Outpatient, or ASC, or Prescheduled Surgery prior to Hospital Admission): No  Use of Multimodal Pain Management, two or more drugs and/or interventions, NOT including systemic opioids: N/A  Exception: Documented allergy to multiple classes of analgesics: N/A    PACU assessment of acute postoperative pain prior to Anesthesia Care End- Applies to Patients Age = 18- (ABG7)  Initial PACU pain score is which of the following: < 7/10  Patient unable to report pain score: N/A    Post-anesthetic transfer of care checklist/protocol to PACU/ICU- (426 and 427)  Upon conclusion of case, patient transferred to which of the following locations: PACU/Non-ICU  Use of transfer checklist/protocol: Yes  Exclusion: Service Performed in Patient Hospital Room (and thus did not require transfer): N/A    PACU Reintubation- (AQI31)  General anesthesia requiring endotracheal intubation (ETT) along with subsequent extubation in OR or PACU: No  Required reintubation in the PACU: N/A  Extubation was a planned trial documented in the medical record prior to removal of the original airway device: N/A    Unplanned admission to ICU related to anesthesia service up through end of PACU care- (MD51)  Unplanned admission to ICU (not initially anticipated at anesthesia start time): No

## 2019-09-24 NOTE — PROGRESS NOTES
"Pharmacy chemotherapy verification:     Protocol: opthalmic mitomycin for trabeculectomy   *Dosing Reference*   Efficacy and safety of mitomycin-C in primary trabeculectomy: five-year follow-up   Ophthalmology Volume 109, Issue 7, July 2002, Pages 4330-3234     Dx: Pterygium    Allergies: Pcn   Ht 1.778 m (5' 10\")   Wt 90.7 kg (199 lb 15.3 oz)   BMI 28.69 kg/m²      Body surface area is 2.12 meters squared.    Drug Order   (Drug name, dose, route, IV Fluid & volume, frequency, number of doses)     Medication = Mitomycin   Base Dose= 0.25 mg/mL x 3 mL   No calculation required   Final Dose = 0.75 mg   Route = topical for the eye  Fluid & Volume = syringe 3 mL   Admin Duration = by MD at time of surgical procedure      <10% difference, okay to treat with final dose    By my signature below, I confirm this process was performed independently with the BSA and all final chemotherapy dosing calculations congruent. I have reviewed the above chemotherapy order and that my calculation of the final dose and BSA (when applicable) corroborate those calculations of the  pharmacist. Discrepancies of 10% or greater have been addressed and documented within the [facility] Progress notes.     Deep Lawrence, PharmD        "

## 2019-09-24 NOTE — DISCHARGE INSTRUCTIONS
ACTIVITY: Rest and take it easy for the first 24 hours.  A responsible adult is recommended to remain with you during that time.  It is normal to feel sleepy.  We encourage you to not do anything that requires balance, judgment or coordination.    MILD FLU-LIKE SYMPTOMS ARE NORMAL. YOU MAY EXPERIENCE GENERALIZED MUSCLE ACHES, THROAT IRRITATION, HEADACHE AND/OR SOME NAUSEA.    FOR 24 HOURS DO NOT:  Drive, operate machinery or run household appliances.  Drink beer or alcoholic beverages.   Make important decisions or sign legal documents.    SPECIAL INSTRUCTIONS: *Follow Dr. Alaniz instruction sheet    DIET: To avoid nausea, slowly advance diet as tolerated, avoiding spicy or greasy foods for the first day.  Add more substantial food to your diet according to your physician's instructions.  Babies can be fed formula or breast milk as soon as they are hungry.  INCREASE FLUIDS AND FIBER TO AVOID CONSTIPATION.    SURGICAL DRESSING/BATHING: May shower but do not get face/eye wet    FOLLOW-UP APPOINTMENT:  9/25/19  8:30 am    You should CALL YOUR PHYSICIAN if you develop:  Fever greater than 101 degrees F.  Pain not relieved by medication, or persistent nausea or vomiting.  Excessive bleeding (blood soaking through dressing) or unexpected drainage from the wound.  Extreme redness or swelling around the incision site, drainage of pus or foul smelling drainage.  Inability to urinate or empty your bladder within 8 hours.  Problems with breathing or chest pain.    You should call 911 if you develop problems with breathing or chest pain.  If you are unable to contact your doctor or surgical center, you should go to the nearest emergency room or urgent care center.  Physician's telephone #: [phone]    If any questions arise, call your doctor.  If your doctor is not available, please feel free to call the Surgical Center at [phone].  The Center is open Monday through Friday from 7AM to 7PM.  You can also call the  HEALTH HOTLINE open 24 hours/day, 7 days/week and speak to a nurse at [phone], or toll free at [phone].    A registered nurse may call you a few days after your surgery to see how you are doing after your procedure.    MEDICATIONS: Resume taking daily medication.  Take prescribed pain medication with food.  If no medication is prescribed, you may take non-aspirin pain medication if needed.  PAIN MEDICATION CAN BE VERY CONSTIPATING.  Take a stool softener or laxative such as senokot, pericolace, or milk of magnesia if needed.      If your physician has prescribed pain medication that includes Acetaminophen (Tylenol), do not take additional Acetaminophen (Tylenol) while taking the prescribed medication.    Depression / Suicide Risk    As you are discharged from this [facility] facility, it is important to learn how to keep safe from harming yourself.    Recognize the warning signs:  · Abrupt changes in personality, positive or negative- including increase in energy   · Giving away possessions  · Change in eating patterns- significant weight changes-  positive or negative  · Change in sleeping patterns- unable to sleep or sleeping all the time   · Unwillingness or inability to communicate  · Depression  · Unusual sadness, discouragement and loneliness  · Talk of wanting to die  · Neglect of personal appearance   · Rebelliousness- reckless behavior  · Withdrawal from people/activities they love  · Confusion- inability to concentrate     If you or a loved one observes any of these behaviors or has concerns about self-harm, here's what you can do:  · Talk about it- your feelings and reasons for harming yourself  · Remove any means that you might use to hurt yourself (examples: pills, rope, extension cords, firearm)  · Get professional help from the community (Mental Health, Substance Abuse, psychological counseling)  · Do not be alone:Call your Safe Contact- someone whom you trust who will be there for  you.  · Call your local CRISIS HOTLINE 656-4097 or 634-378-3921  · Call your local Children's Mobile Crisis Response Team [place] (626) 745-3768 or www.sailsquare.XYDO  · Call the toll free National Suicide Prevention Hotlines   · National Suicide Prevention Lifeline 637-736-KOHI (3970)  National [employer] Line Network 800-SUICIDE (294-6088)Instrucciones Para [place]  (Home Care Instructions)    ACTIVIDAD: Descanse y tome todo con mucha calma las primeras 24 horas después de haq cirugía.  Maddie persona adulta responsable debe permanecer con usted fanta stanley periodo de tiempo.  Es normal sentirse sonoliento o sonolienta fanta esas primeras horas.  Le recomendamos que no suad nada que requiera equilibrio, ayden decisiones a mucha coordinación de haq parte.    NO SUAD ESTO PURANTE LAS PRIMERAS 24 HORAS:   Manejar o conducir algún vehiculo, operar maquinarias o utilizar electrodomesticos.   Beber cerveza o algún otro tipo de bebida alcohólica.   Ayden decisiones importantes o firmar documentos legales.    INSTRUCCIONES ESPECIALES: See Instructions from your doctor    DIETA: Para evitar las nauseas, prosiga despacito con haq dieta a medida que pueda ir tolerándola mejor, evite comidas muy condimentadas o grasosas fanta stanley primer día.  Vaya agregando comidas más substanciadas a haq dieta a medida que asi lo indique haq médica.  Los bebés pueden beber leche preparada o formula, ásl brittany también leche del seno de la madre a medida que vayan teniendo hambre.  SIGA AGREGANDO LIQUIDOS Y COMIDAS CON FIBRA PARA EVITAR ESTREÑIMIENTO.    BRITTANY BAÑARSE Y CAMBIAR LOS VENDAJES DE LA CIRUGIA: Do not get eye wet    MEDICAMENTOS/MEDICINAS:  Vuelva a ayden rita medicamentos diarios.  Osmond los medicamentos que se le prescribe con un poco de comida.  Si no le prescribe ningún tipo de medicamento, entonces puede aydne medicinas para el dolor que no contienen aspirina, si las necesita.  LAS MEDICINAS PARA EL DOLOR PUEDEN ESTREÑIRLE MUCHO.   Daphne un suavizante para el excremento o materia fecal (stool softener) o un laxativo peter por ejemplo: senokot, pericolase, o leche de magnesia, si lo necesita.      9/25/19  0830    Usted debe LIAMAR A HAQ MEDICO si tiene los siguientes síntomas:   -   Maddie fiebre más rogers de 101 grados Fahrenheit.   -   Un dolor incesante aún con los medicamentos, o nauseas y vómito persistente.   -   Un sangrado excesivo (clayton que traspasa los vendajes o gasas) o algúln tipo de drenaje inesperado que proviene de la henda.     -   Un color walker exagerado o hinchazón alrededor del área en donde se le hizo incisión o timbo, o un drenaje de pus o con olor stephanie proveniente de la henda.   -    La inhabilidad de orinar o vaciar haq vejiga en 8 horas.   -    Problemas con a respiración o agustín en el pecho.    Usted debe llamar al 911 si se presentan problemas con el dolor al respirar o el pecho.  Si no se puede ponnoer en comunicación con un medica o con el centro de cirugía, usted debe ir a la estación de emergencia (emergency room) más cercana o a un centro de atención de urgencia (urgent care center).  El teléfono del medico es: [phone]    LOS SÍNTOMAS DE UN LEVE RESFRIO SON MUY NORMALES.  ADEMÁS USTED PUEDE LLEGAR A SENTIR AGUSTÍN GENERALES DE MÚSCULOS, IRRITACIÓN EN LA GARGANTA, AGUSTÍN DE TARIK Y/O UN POCO DE NAUSEAS.    Sie tiene alguna pregunta, llame a haq médico.  Si haq médico no se encuentra disponible, por favor llame al Centro de Cirugía at [phone].  el Centro está abierto de Lunes a Viernes desde las 7:00 de la manana hasta las 7:00 de la noche.  Usted también puede llamar al CENTRO DE LLAMADAS SOBRE LA CHARLIE o HEALTH HOTLINE.  Sara está abierto viente y cuatro horas por delbert, siete dhaliwal por semana, allí podrá hablar con maddie enfermera.  Llame al [phone], o al número aakash 6 [phone].    Mi firma a continuación indica que he recibido y entiendco estas instrucciones acera de los cuidados en la casa  (Home Care Instructions)    · Usted recibirá elisa encuesta en la correspondencia en las siguientes semanas y le pedimos que por favor tome un momento para completar juan c encuesta y regresaría a hosotros.  Nuestro objetivó es brindarle un cuidado muy cabrera y par lo tanto apreciamos rita coméntanos.  Muchas tj por ashly escogido el [facility]ugía de [facility] [place] [facility]

## 2019-09-24 NOTE — ANESTHESIA POSTPROCEDURE EVALUATION
Patient: Nadeem Araujo    Procedure Summary     Date:  09/24/19 Room / Location:  [facility] ROOM 27 / SURGERY SAME DAY [facility]    Anesthesia Start:  1226 Anesthesia Stop:  1300    Procedure:  EXCISION, PTERYGIUM - WITH AMNIO GRAFT (Left Eye) Diagnosis:  (PTERYGIUM)    Surgeon:  Jose Singh M.D. Responsible Provider:  Alvaro Anton D.O.    Anesthesia Type:  MAC ASA Status:  1          Final Anesthesia Type: MAC  Last vitals  BP   NIBP: 147/97    Temp   37.2 °C (98.9 °F)    Pulse   Pulse: 62   Resp   16    SpO2   95 %      Anesthesia Post Evaluation    Patient location during evaluation: PACU  Patient participation: complete - patient participated  Level of consciousness: awake and alert  Pain score: 0    Airway patency: patent  Anesthetic complications: no  Cardiovascular status: hemodynamically stable  Respiratory status: acceptable  Hydration status: euvolemic    PONV: none           Nurse Pain Score: 0 (NPRS)

## 2019-09-24 NOTE — OR NURSING
1300 to pacu from or awake without airways denies pain and nausea. Report recvd from anesthesia and or rn   1311 report to Mary Hall

## 2019-09-24 NOTE — ANESTHESIA PREPROCEDURE EVALUATION
Relevant Problems   CARDIAC   (+) Essential hypertension      GI   (+) Gastroesophageal reflux disease without esophagitis       Physical Exam    Airway   Mallampati: II  TM distance: >3 FB  Neck ROM: full       Cardiovascular - normal exam  Rhythm: regular  Rate: normal  (-) murmur     Dental - normal exam         Pulmonary - normal exam  Breath sounds clear to auscultation     Abdominal    Neurological - normal exam                 Anesthesia Plan    ASA 1       Plan - MAC             Induction: intravenous    Postoperative Plan: Postoperative administration of opioids is intended.    Pertinent diagnostic labs and testing reviewed    Informed Consent:    Anesthetic plan and risks discussed with patient.    Use of blood products discussed with: patient whom consented to blood products.

## 2019-09-24 NOTE — OR NURSING
1311 Report received from Joy GALICIA. Pt arouses easily, denies pain. VSS. Dozes off to sleep. Family member is not in waiting room. Unable to leave a voicemail as mailbox is full.    1330 Denies wanting water or to void. IV d/c'd intact.    1350 Continue to wait for niece to arrive, sleepy. Dozes off to sleep. Refuses water/po.       1430    Discharge instructions explained, copy signed and given, eye kit given to patient. Drsg to left eye dry and intact.    1435 To family car and home with niece as .

## 2019-09-24 NOTE — ANESTHESIA TIME REPORT
Anesthesia Start and Stop Event Times     Date Time Event    9/24/2019 1148 Ready for Procedure     1226 Anesthesia Start     1300 Anesthesia Stop        Responsible Staff  09/24/19    Name Role Begin End    Alvaro Anton D.O. Anesth 1226 1300        Preop Diagnosis (Free Text):  Pre-op Diagnosis     PTERYGIUM        Preop Diagnosis (Codes):    Post op Diagnosis  Pterygium eye, left      Premium Reason  Non-Premium    Comments:

## 2019-10-01 NOTE — OP REPORT
DATE OF SERVICE:  09/24/2019    PREOPERATIVE DIAGNOSIS:  Pterygium, left eye.    POSTOPERATIVE DIAGNOSIS:  Pterygium, left eye.    PROCEDURE PERFORMED:  Pterygium excision with amniotic tissue graft, left eye.    SURGEON:  Jose Singh MD    ANESTHESIA:  Local MAC.    INDICATIONS:  The patient has a progressively enlarging nasal pterygium, left   eye, causing ocular irritation.  Following a discussion of the risks and   benefits of surgery including the risk of recurrence, the decision was made to   proceed with elective pterygium excision, left eye.    DESCRIPTION OF PROCEDURE:  The patient was taken to the operating room and   placed in the supine position on the operating room table.  Appropriate   anesthetic monitors were positioned.  Anesthesia was accomplished with topical   tetracaine eyedrops under IV sedation.  The eye was prepared and draped in   the usual sterile fashion for ocular surgery.  A wirelid speculum was   positioned for exposure.  The body of the pterygium was outlined with a   surgical marking pen and anesthesia was augmented with a local injection of 1%   lidocaine with epinephrine beneath the body of the pterygium.  The pterygium   was excised with Herman scissors and avulsed free from the cornea.  A   surgical sponge supersaturated with mitomycin in a concentration of 0.25 mg/mL   was placed on the bare scleral bed for exactly 1 minute.  The ocular surface   was copiously irrigated with BSS.  An amniotic tissue graft (AmnioGraft) was placed over   the scleral defect with tissue glue.  The wirelid speculum was removed and the   eye was dressed with TobraDex ophthalmic ointment and a pressure patch.  The   patient was taken to the recovery room in stable condition.       ____________________________________     MD CHANEL VÁZQUEZ / NTS    DD:  10/01/2019 13:30:39  DT:  10/01/2019 14:18:43    D#:  [id number]  Job#:  [id number]

## 2019-12-11 DIAGNOSIS — I10 ESSENTIAL HYPERTENSION: ICD-10-CM

## 2019-12-11 RX ORDER — LISINOPRIL 10 MG/1
10 TABLET ORAL DAILY
Qty: 90 TAB | Refills: 3 | Status: SHIPPED | OUTPATIENT
Start: 2019-12-11 | End: 2020-02-12

## 2019-12-11 NOTE — TELEPHONE ENCOUNTER
Requested Prescriptions     Pending Prescriptions Disp Refills   • lisinopril (PRINIVIL) 10 MG Tab [Pharmacy Med Name: LISINOPRIL 5 MG TABLET] 90 Tab 3     Sig: Take 1 Tab by mouth every day for 90 days.   Ina Dubois M.D.

## 2020-02-12 ENCOUNTER — OFFICE VISIT (OUTPATIENT)
Dept: MEDICAL GROUP | Facility: PHYSICIAN GROUP | Age: 45
End: 2020-02-12
Payer: COMMERCIAL

## 2020-02-12 VITALS
HEIGHT: 70 IN | WEIGHT: 210 LBS | DIASTOLIC BLOOD PRESSURE: 90 MMHG | HEART RATE: 70 BPM | OXYGEN SATURATION: 95 % | BODY MASS INDEX: 30.06 KG/M2 | SYSTOLIC BLOOD PRESSURE: 128 MMHG | TEMPERATURE: 99 F | RESPIRATION RATE: 20 BRPM

## 2020-02-12 DIAGNOSIS — I10 ESSENTIAL HYPERTENSION: ICD-10-CM

## 2020-02-12 DIAGNOSIS — K21.9 GASTROESOPHAGEAL REFLUX DISEASE WITHOUT ESOPHAGITIS: ICD-10-CM

## 2020-02-12 DIAGNOSIS — J06.9 VIRAL URI WITH COUGH: ICD-10-CM

## 2020-02-12 PROCEDURE — 99214 OFFICE O/P EST MOD 30 MIN: CPT | Performed by: FAMILY MEDICINE

## 2020-02-12 ASSESSMENT — PATIENT HEALTH QUESTIONNAIRE - PHQ9: CLINICAL INTERPRETATION OF PHQ2 SCORE: 0

## 2020-02-13 NOTE — PROGRESS NOTES
"cc: Hypertension    Subjective:     Nadeem Araujo is a 45 y.o. male presenting for six-month follow-up of his hypertension    1. Essential hypertension  Chronic medical diagnosis.  Currently taking lisinopril 10 mg daily.  Denies any headaches or chest pain.  Has not been checking his blood pressure at home.  Blood pressure today is 128/90.    2. Gastroesophageal reflux disease without esophagitis  Chronic medical diagnosis.  Seems to take omeprazole 20 mg daily.  Denies any heartburn with this medication.  Mentions that his heartburn is triggered by spicy food and if he is not careful with his diet.    3. Viral URI with cough  New medical problem.  Ever since returning from [place] a month ago, he has been complaining of an upper respiratory infection.  Does have nasal congestion as well as a cough.  Denies any fevers or chills.      Review of systems:  See above and positive for nonproductive cough.  No Known Allergies      Current Outpatient Medications:   •  omeprazole (PRILOSEC) 20 MG delayed-release capsule, Take 1 Cap by mouth every day., Disp: 90 Cap, Rfl: 3  •  lisinopril (PRINIVIL) 10 MG Tab, Take 1 Tab by mouth every day., Disp: 90 Tab, Rfl: 3    Allergies, past medical history, past surgical history, family history, social history reviewed and updated    Objective:     Vitals: /90 (BP Location: Left arm, Patient Position: Sitting, BP Cuff Size: Adult)   Pulse 70   Temp 37.2 °C (99 °F) (Temporal)   Resp 20   Ht 1.778 m (5' 10\")   Wt 95.3 kg (210 lb)   SpO2 95%   BMI 30.13 kg/m²   General:  Alert, pleasant, NAD  Eyes:  normal inspection of conjunctivae and lids, EOMI,   ENMT: nasal congestion External ears and nose are normal.    Neck  supple,   Heart:  Regular rate and rhythm,  No LE edema  Respiratory:  Normal respiratory effort, Clear to auscultation bilaterally but decreased. No wheezing  Abdomen:   soft, Non-distended,   Skin:  Warm, dry, no rashes,   Musculoskeletal:  Normal " gait, Normal digits and nails.  Neurological: No tremors,   Psych:  Affect/mood is normal, judgement is good, memory is intact, grooming is appropriate.    Assessment/Plan:     Nadeem was seen today for follow-up and results.    Diagnoses and all orders for this visit:    Essential hypertension  Chronic medical diagnosis.  Improving.  Continue with lisinopril 10 mg daily.    Gastroesophageal reflux disease without esophagitis  Chronic medical diagnosis.  Stable.  Continue with omeprazole 20 mg daily.    Viral URI with cough  New medical problem.  Encouraged to try over-the-counter Mucinex, Flonase nasal spray, and/or Delsym.  Discussed with patient to follow-up if no improvement.  Follow-up in 2 weeks for flu vaccine.            Return in about 6 months (around 8/12/2020) for 2 wks with MA,  6 mths with MD.  This note was created using voice recognition software (Dragon). The accuracy of the dictation is limited by the abilities of the software. I have reviewed the note prior to signing, however some errors in grammar and context are still possible. If you have any questions related to this note please do not hesitate to contact our office.

## 2020-02-26 ENCOUNTER — NON-PROVIDER VISIT (OUTPATIENT)
Dept: MEDICAL GROUP | Facility: PHYSICIAN GROUP | Age: 45
End: 2020-02-26
Payer: COMMERCIAL

## 2020-02-26 DIAGNOSIS — Z23 NEED FOR VACCINATION: ICD-10-CM

## 2020-02-26 PROCEDURE — 90471 IMMUNIZATION ADMIN: CPT | Performed by: FAMILY MEDICINE

## 2020-02-26 PROCEDURE — 90686 IIV4 VACC NO PRSV 0.5 ML IM: CPT | Performed by: FAMILY MEDICINE

## 2020-02-27 NOTE — NON-PROVIDER
"Nadeem Araujo is a 45 y.o. male here for a non-provider visit for:   FLU    Reason for immunization: Annual Flu Vaccine  Immunization records indicate need for vaccine: Yes, confirmed with Epic  Minimum interval has been met for this vaccine: Yes  ABN completed: Not Indicated    Order and dose verified by: pla  VIS Dated  8/15 was given to patient: Yes  All IAC Questionnaire questions were answered \"No.\"    Patient tolerated injection and no adverse effects were observed or reported: Yes    Pt scheduled for next dose in series: Not Indicated    "

## 2020-06-05 DIAGNOSIS — I10 ESSENTIAL HYPERTENSION: ICD-10-CM

## 2020-06-05 RX ORDER — LISINOPRIL 10 MG/1
TABLET ORAL
Qty: 90 TAB | Refills: 0 | Status: SHIPPED | OUTPATIENT
Start: 2020-06-05 | End: 2020-08-12

## 2020-06-05 NOTE — TELEPHONE ENCOUNTER
Requested Prescriptions     Pending Prescriptions Disp Refills   • lisinopril (PRINIVIL) 10 MG Tab [Pharmacy Med Name: LISINOPRIL 10 MG TABLET] 90 Tab 0     Sig: TAKE 1 TABLET BY MOUTH EVERY DAY       JONNATHAN Ramirez.

## 2020-06-05 NOTE — TELEPHONE ENCOUNTER
Received request via: Pharmacy    Was the patient seen in the last year in this department? Yes 2/12/2020    Does the patient have an active prescription (recently filled or refills available) for medication(s) requested? No

## 2020-08-12 ENCOUNTER — OFFICE VISIT (OUTPATIENT)
Dept: MEDICAL GROUP | Facility: PHYSICIAN GROUP | Age: 45
End: 2020-08-12
Payer: COMMERCIAL

## 2020-08-12 VITALS
HEART RATE: 70 BPM | DIASTOLIC BLOOD PRESSURE: 78 MMHG | WEIGHT: 216 LBS | BODY MASS INDEX: 30.92 KG/M2 | TEMPERATURE: 98.8 F | OXYGEN SATURATION: 95 % | RESPIRATION RATE: 20 BRPM | SYSTOLIC BLOOD PRESSURE: 118 MMHG | HEIGHT: 70 IN

## 2020-08-12 DIAGNOSIS — E78.2 MIXED HYPERLIPIDEMIA: ICD-10-CM

## 2020-08-12 DIAGNOSIS — K21.9 GASTROESOPHAGEAL REFLUX DISEASE WITHOUT ESOPHAGITIS: ICD-10-CM

## 2020-08-12 DIAGNOSIS — I10 ESSENTIAL HYPERTENSION: ICD-10-CM

## 2020-08-12 PROBLEM — H11.002 PTERYGIUM OF LEFT EYE: Status: RESOLVED | Noted: 2018-11-20 | Resolved: 2020-08-12

## 2020-08-12 PROBLEM — J06.9 VIRAL URI WITH COUGH: Status: RESOLVED | Noted: 2020-02-12 | Resolved: 2020-08-12

## 2020-08-12 PROCEDURE — 99214 OFFICE O/P EST MOD 30 MIN: CPT | Performed by: FAMILY MEDICINE

## 2020-08-12 RX ORDER — OMEPRAZOLE 20 MG/1
20 CAPSULE, DELAYED RELEASE ORAL DAILY
Qty: 90 CAP | Refills: 3 | Status: SHIPPED | OUTPATIENT
Start: 2020-08-12 | End: 2021-01-04 | Stop reason: SDUPTHER

## 2020-08-12 RX ORDER — AMLODIPINE BESYLATE 5 MG/1
5 TABLET ORAL DAILY
Qty: 30 TAB | Refills: 5 | Status: SHIPPED | OUTPATIENT
Start: 2020-08-12 | End: 2020-12-17

## 2020-08-12 ASSESSMENT — FIBROSIS 4 INDEX: FIB4 SCORE: 0.75

## 2020-08-12 NOTE — PROGRESS NOTES
"CC: Follow-up                                                                                                                                   Nadeem presents today for follow-up of hypertension.      Hypertension  Chronic medical diagnosis.  Has been on lisinopril since November 2018.  Checks bp periodically at local pharmacies with readings in the 125-128. Denies chest pain and headaches.  Has noticed since starting this medication that he has had a chronic nonproductive cough.  Denies any fevers or chills.  He is concerned that this could be due to his lisinopril.  Blood pressure today was initially 142/86 and on recheck it is 118/78.    GERD  Chronic.  Takes omeprazole 20mg daily in am.  Heartburn triggerd by chili, coffee and other spicy foods.  Heartburn is well controlled when he avoids these medications.    Hyperlipidemia  Chronic medical diagnosis.  Previous labs from February have total cholesterol elevated at 208, HDL 44, triglycerides 119, and LDL elevated at 140.     Patient Active Problem List    Diagnosis Date Noted   • Mixed hyperlipidemia 01/02/2019   • Essential hypertension 11/27/2018   • Gastroesophageal reflux disease without esophagitis 11/20/2018       Current Outpatient Medications   Medication Sig Dispense Refill   • amLODIPine (NORVASC) 5 MG Tab Take 1 Tab by mouth every day. 30 Tab 5   • omeprazole (PRILOSEC) 20 MG delayed-release capsule Take 1 Cap by mouth every day. 90 Cap 3     No current facility-administered medications for this visit.          Allergies as of 08/12/2020   • (No Known Allergies)          /78   Pulse 70   Temp 37.1 °C (98.8 °F) (Temporal)   Resp 20   Ht 1.778 m (5' 10\")   Wt 98 kg (216 lb)   SpO2 95%   BMI 30.99 kg/m²     Physical Exam:  Gen:         Alert and oriented, No apparent distress.  Neck:        supple, No Lymphadenopathy  Lungs:     Clear to auscultation bilaterally  CV:          Regular rate and rhythm. no LE edema             Ext:          " No clubbing, cyanosis      Assessment and Plan.   45 y.o. male with the following issues.    Nadeem was seen today for follow-up.    Diagnoses and all orders for this visit:    Essential hypertension  Chronic.  Has been well controlled on lisinopril 10 mg.  Will discontinue this medication due to cough.  Start low-dose amlodipine 5 mg at nighttime.  Follow-up in 2 weeks for MA visit to have blood pressure checked.  Follow-up with me in 6 months  .-     CBC WITH DIFFERENTIAL; Future  -     Comp Metabolic Panel; Future  -     amLODIPine (NORVASC) 5 MG Tab; Take 1 Tab by mouth every day.    Gastroesophageal reflux disease without esophagitis  Chronic.  Overall stable with omeprazole 20 mg.  Avoiding triggers discussed and encouraged.  -     omeprazole (PRILOSEC) 20 MG delayed-release capsule; Take 1 Cap by mouth every day.    Mixed hyperlipidemia  Chronic medical diagnosis.  Previous labs that show some elevation in total cholesterol and LDL.  Dietary precautions and changes discussed and encouraged.  We will recheck labs prior to next appointment with me in 6 months  -     Lipid Profile; Future        Follow up: 2 wks MA and 6 months pcp

## 2020-08-26 ENCOUNTER — NON-PROVIDER VISIT (OUTPATIENT)
Dept: MEDICAL GROUP | Facility: PHYSICIAN GROUP | Age: 45
End: 2020-08-26
Payer: COMMERCIAL

## 2020-08-26 VITALS — HEART RATE: 80 BPM | SYSTOLIC BLOOD PRESSURE: 134 MMHG | DIASTOLIC BLOOD PRESSURE: 88 MMHG

## 2020-08-26 NOTE — NON-PROVIDER
Nadeem Araujo is a 45 y.o. male here for a non-provider visit for BP Check    Vitals:    08/26/20 1623   BP: 134/88   BP Location: Left arm   Patient Position: Sitting   BP Cuff Size: Adult   Pulse: 80     If abnormal, was an in office provider notified today? Yes  Routed to PCP? Yes

## 2020-08-28 NOTE — PROGRESS NOTES
Voicemail box full, unable to leave message. (Letty) Attempted to call twice on 8/28/2020 @2:20pm

## 2020-08-28 NOTE — PROGRESS NOTES
Please let patient know in [place] that his BP at the MA visit was still higher than our goal.   Our goal BP is less than 130/80.  He was 134/88.  I would like him to increase his amlodipine 5mg to two tablets daily at the same time.  He should return in 1 month for another BP visit for a MA appt.    Thank You,  Dr Dubois

## 2020-08-31 NOTE — PROGRESS NOTES
Letter sent to patient recommending increase in amlodipine to 5mg, two tablets daily and to f/u in 1 month for BP visit.    Ina Dubois M.D.

## 2020-08-31 NOTE — LETTER
August 31, 2020        Nadeem Araujo  [address] [place] [address]        Dear Nadeem:    Rachel por venir a por tu presion arterial.  Nuestro meta es menos de 130/80.  Barraza presion en la oficina fue 134/88.  Yo recomiendo subir rita pastillas.  Por favor, empieza a marcos dos patillas de barraza amlodipine 5mg.  Tambien, por favor regresa a mi oficina en un mes para chequear de barraza presion.        Rachel,  Dr Dubois    Si tiene preguntas, por favor habla nosotros oficina.         Electronically Signed

## 2020-12-17 DIAGNOSIS — I10 ESSENTIAL HYPERTENSION: ICD-10-CM

## 2020-12-17 RX ORDER — AMLODIPINE BESYLATE 10 MG/1
10 TABLET ORAL DAILY
Qty: 30 TAB | Refills: 5 | Status: SHIPPED | OUTPATIENT
Start: 2020-12-17 | End: 2021-01-04 | Stop reason: SDUPTHER

## 2020-12-18 NOTE — PROGRESS NOTES
Phone Number Called: [phone] (home)       Call outcome: Spoke to patient regarding message below.    Message: apt made sooner and he was advised of new rx.

## 2020-12-31 ENCOUNTER — TELEPHONE (OUTPATIENT)
Dept: MEDICAL GROUP | Facility: PHYSICIAN GROUP | Age: 45
End: 2020-12-31

## 2020-12-31 NOTE — TELEPHONE ENCOUNTER
Phone Number Called:[phone] (home)        Call outcome: unable to leave a message    Message: Result note from Dr. Dubois resulted on 12/31/20

## 2020-12-31 NOTE — TELEPHONE ENCOUNTER
----- Message from Jose J Malave M.D. sent at 12/31/2020 12:02 PM PST -----  Please call patient (may require Citizen of [place]) to let him know the labs were done.  He can review them with Dr. Dubois, during his appointment on the 4th.  His high cholesterol and LDL were similar to what had been noted before (as noted in other lab result note).  His chemicals/CMP were in normal ranges.  And (also same lab set) his CBC noted improvement in white blood cells and eosinophils, from before.   Thank you.   Jose J Malave M.D., 12/31/2020

## 2020-12-31 NOTE — RESULT ENCOUNTER NOTE
Please call patient (may require [place]) to let him know the labs were done.  He can review them with Dr. Dubois, during his appointment on the 4th.  His high cholesterol and LDL were similar to what had been noted before (as noted in other lab result note).  His chemicals/CMP were in normal ranges.  And (also same lab set) his CBC noted improvement in white blood cells and eosinophils, from before.   Thank you.   Jose J Malave M.D., 12/31/2020

## 2021-01-04 ENCOUNTER — OFFICE VISIT (OUTPATIENT)
Dept: MEDICAL GROUP | Facility: PHYSICIAN GROUP | Age: 46
End: 2021-01-04
Payer: COMMERCIAL

## 2021-01-04 VITALS
HEART RATE: 76 BPM | BODY MASS INDEX: 32.21 KG/M2 | OXYGEN SATURATION: 98 % | RESPIRATION RATE: 20 BRPM | HEIGHT: 70 IN | WEIGHT: 225 LBS | TEMPERATURE: 98.6 F | SYSTOLIC BLOOD PRESSURE: 132 MMHG | DIASTOLIC BLOOD PRESSURE: 84 MMHG

## 2021-01-04 DIAGNOSIS — E78.2 MIXED HYPERLIPIDEMIA: ICD-10-CM

## 2021-01-04 DIAGNOSIS — Z23 NEED FOR VACCINATION: ICD-10-CM

## 2021-01-04 DIAGNOSIS — K21.9 GASTROESOPHAGEAL REFLUX DISEASE WITHOUT ESOPHAGITIS: ICD-10-CM

## 2021-01-04 DIAGNOSIS — I10 ESSENTIAL HYPERTENSION: ICD-10-CM

## 2021-01-04 PROCEDURE — 90471 IMMUNIZATION ADMIN: CPT | Performed by: FAMILY MEDICINE

## 2021-01-04 PROCEDURE — 90686 IIV4 VACC NO PRSV 0.5 ML IM: CPT | Performed by: FAMILY MEDICINE

## 2021-01-04 PROCEDURE — 99214 OFFICE O/P EST MOD 30 MIN: CPT | Mod: 25 | Performed by: FAMILY MEDICINE

## 2021-01-04 RX ORDER — OMEPRAZOLE 20 MG/1
20 CAPSULE, DELAYED RELEASE ORAL DAILY
Qty: 90 CAP | Refills: 3 | Status: SHIPPED | OUTPATIENT
Start: 2021-01-04 | End: 2021-04-05 | Stop reason: SDUPTHER

## 2021-01-04 RX ORDER — AMLODIPINE BESYLATE 10 MG/1
10 TABLET ORAL DAILY
Qty: 90 TAB | Refills: 3 | Status: SHIPPED | OUTPATIENT
Start: 2021-01-04 | End: 2021-04-05 | Stop reason: SDUPTHER

## 2021-01-04 RX ORDER — BLOOD PRESSURE TEST KIT
1 KIT MISCELLANEOUS ONCE
Qty: 1 KIT | Refills: 0 | Status: SHIPPED | OUTPATIENT
Start: 2021-01-04 | End: 2021-01-04

## 2021-01-04 ASSESSMENT — PATIENT HEALTH QUESTIONNAIRE - PHQ9: CLINICAL INTERPRETATION OF PHQ2 SCORE: 0

## 2021-01-04 ASSESSMENT — FIBROSIS 4 INDEX: FIB4 SCORE: 0.75

## 2021-01-04 NOTE — PROGRESS NOTES
"CC: follow up hypertension                                                                                                                                  Nadeem presents today for follow up.     hypertension  Chronic medical condition. BP today is 132/84.  Doesn't check bps at home as he does not have a monitor.  Currently taking amlodipine 10mg nightly.  Denies symptoms of chest pain, headaches, or shortness of breath.     GERD   Chronic medical diagnosis.  Continues to take omeprazole 20mg daily.  Heartburn worse when eats spicy food.  States that there are days when he forgets to take this medication and does not have heartburn.    Hyperlipidemia  Chronic. Recent labs have total cholesterol at 208 and .  These labs were compared to labs from February 2020 and at that time total cholesterol was 208 and LDL was 140.  Denies any dyspnea, chest pain or palpitations.    Vaccines discussed with patient and will receive the flu vaccine today.       Patient Active Problem List    Diagnosis Date Noted   • Mixed hyperlipidemia 01/02/2019   • Essential hypertension 11/27/2018   • Gastroesophageal reflux disease without esophagitis 11/20/2018       Current Outpatient Medications   Medication Sig Dispense Refill   • Blood Pressure Kit 1 Each one time for 1 dose. 1 Kit 0   • amLODIPine (NORVASC) 10 MG Tab Take 1 Tab by mouth every day. 90 Tab 3   • omeprazole (PRILOSEC) 20 MG delayed-release capsule Take 1 Cap by mouth every day. 90 Cap 3     No current facility-administered medications for this visit.          Allergies as of 01/04/2021   • (No Known Allergies)          /84   Pulse 76   Temp 37 °C (98.6 °F) (Temporal)   Resp 20   Ht 1.778 m (5' 10\")   Wt 102.1 kg (225 lb)   SpO2 98%   BMI 32.28 kg/m²     Physical Exam:  Gen:         Alert and oriented, No apparent distress.  Neck:        supple, No Lymphadenopathy  Lungs:     Clear to auscultation bilaterally  CV:          Regular rate and rhythm. no LE " edema             Ext:          No clubbing, cyanosis      Assessment and Plan.   45 y.o. male with the following issues.    Nadeem was seen today for follow-up.    Diagnoses and all orders for this visit:    Essential hypertension  Chronic.  Blood pressure remains borderline.  Encouraged to start checking blood pressures at home.  Blood pressure parameters written down for patient.  -     Blood Pressure Kit; 1 Each one time for 1 dose.  -     amLODIPine (NORVASC) 10 MG Tab; Take 1 Tab by mouth every day.    Need for vaccination  -     Influenza Vaccine Quad Injection (PF)    Gastroesophageal reflux disease without esophagitis  Chronic.  Stable.  Continue with Prilosec  .-     omeprazole (PRILOSEC) 20 MG delayed-release capsule; Take 1 Cap by mouth every day.    Mixed hyperlipidemia  Chronic.  Labs have been stable.  Dietary and lifestyle modifications discussed with patient.  Patient states that he has gained some weight with the current pandemic.  He will work on lifestyle measures.      Follow up: new PCP

## 2021-04-05 ENCOUNTER — OFFICE VISIT (OUTPATIENT)
Dept: MEDICAL GROUP | Facility: PHYSICIAN GROUP | Age: 46
End: 2021-04-05
Payer: COMMERCIAL

## 2021-04-05 VITALS
DIASTOLIC BLOOD PRESSURE: 82 MMHG | TEMPERATURE: 97.2 F | BODY MASS INDEX: 33.18 KG/M2 | HEIGHT: 69 IN | HEART RATE: 85 BPM | OXYGEN SATURATION: 95 % | SYSTOLIC BLOOD PRESSURE: 144 MMHG | WEIGHT: 224 LBS

## 2021-04-05 DIAGNOSIS — Z00.00 ROUTINE HEALTH MAINTENANCE: ICD-10-CM

## 2021-04-05 DIAGNOSIS — E66.9 CLASS 1 OBESITY WITHOUT SERIOUS COMORBIDITY WITH BODY MASS INDEX (BMI) OF 33.0 TO 33.9 IN ADULT, UNSPECIFIED OBESITY TYPE: ICD-10-CM

## 2021-04-05 DIAGNOSIS — K21.9 GASTROESOPHAGEAL REFLUX DISEASE WITHOUT ESOPHAGITIS: ICD-10-CM

## 2021-04-05 DIAGNOSIS — E78.2 MIXED HYPERLIPIDEMIA: ICD-10-CM

## 2021-04-05 DIAGNOSIS — Z76.89 ESTABLISHING CARE WITH NEW DOCTOR, ENCOUNTER FOR: ICD-10-CM

## 2021-04-05 DIAGNOSIS — I10 ESSENTIAL HYPERTENSION: ICD-10-CM

## 2021-04-05 PROCEDURE — 99214 OFFICE O/P EST MOD 30 MIN: CPT | Performed by: NURSE PRACTITIONER

## 2021-04-05 RX ORDER — OMEPRAZOLE 20 MG/1
20 CAPSULE, DELAYED RELEASE ORAL DAILY
Qty: 90 CAPSULE | Refills: 3 | Status: SHIPPED | OUTPATIENT
Start: 2021-04-05 | End: 2022-03-24

## 2021-04-05 RX ORDER — AMLODIPINE BESYLATE 10 MG/1
10 TABLET ORAL DAILY
Qty: 90 TABLET | Refills: 3 | Status: SHIPPED | OUTPATIENT
Start: 2021-04-05 | End: 2022-04-22

## 2021-04-05 ASSESSMENT — FIBROSIS 4 INDEX: FIB4 SCORE: 0.76

## 2021-04-05 NOTE — ASSESSMENT & PLAN NOTE
This is a new problem to the examiner. Chronic, ongoing. Patient has had a 14 pound weight gain since February 2020. Reports that he has gained weight due to the pandemic. States that he is not exercising other than physical activity through his job as a . States that diet is overall healthy. Due for annual labs in December 2021.

## 2021-04-05 NOTE — PROGRESS NOTES
CC:   Chief Complaint   Patient presents with   • Establish Care     HISTORY OF THE PRESENT ILLNESS: Patient is a 46 y.o. male. This pleasant patient is here today to establish care and discuss multiple issues as listed below.    Health Maintenance: Completed    Essential hypertension  This is a new problem to the examiner. Chronic, ongoing. Continues amlodipine 10 mg/day, denies side effects of the medication. Checks blood pressure weekly when out grocery shopping and it is good. Reports that blood pressure tends to be higher when in the doctor office. The patient denies chest pain, shortness of breath, headaches, dizziness, blurry vision, or dyspnea on exertion.     Gastroesophageal reflux disease without esophagitis  This is a new problem to the examiner. Chronic, ongoing. Continues omeprazole 20 mg/day, denies side effects from the medication. Has been taking the medication for about two years. Reports that coffee and spicy food is a trigger for acid reflux. Denies cough, hoarseness, globus sensation, shortness of breath, sore throat, early satiety, unintentional weight loss, choking, dysphagia, persistent burning pain in chest or upper abdomen, nausea, vomiting, melena.    Mixed hyperlipidemia  This is a new problem to the examiner. Chronic, ongoing without medication. Last lipid complete 12/30/2020: total cholesterol 208, triglycerides 64, HDL 45,  Reports that he eats most meals at home. Reports that his diet is overall healthy. 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  The ASCVD Risk score ([place] DC Jr, et al., 2013): 3.56%    Class 1 obesity without serious comorbidity with body mass index (BMI) of 33.0 to 33.9 in adult  This is a new problem to the examiner. Chronic, ongoing. Patient has had a 14 pound weight gain since February 2020. Reports that he has gained weight due to the pandemic. States that he is not  exercising other than physical activity through his job as a . States that diet is overall healthy. Due for annual labs in December 2021.    Allergies: Patient has no known allergies.  Current Outpatient Medications Ordered in Epic   Medication Sig Dispense Refill   • amLODIPine (NORVASC) 10 MG Tab Take 1 tablet by mouth every day. 90 tablet 3   • omeprazole (PRILOSEC) 20 MG delayed-release capsule Take 1 capsule by mouth every day. 90 capsule 3     No current [facility]-ordered facility-administered medications on file.     Past Medical History:   Diagnosis Date   • GERD (gastroesophageal reflux disease)    • Hypertension      Past Surgical History:   Procedure Laterality Date   • PTERYGIUM EXCISION Left 9/24/2019    Procedure: EXCISION, PTERYGIUM - WITH AMNIO GRAFT;  Surgeon: Jose Singh M.D.;  Location: SURGERY SAME DAY [facility];  Service: Ophthalmology   • PTERYGIUM EXCISION Right 4/23/2019    Procedure: EXCISION, PTERYGIUM WITH CONJUCTIVAL OUTOGRAFT;  Surgeon: Jose Singh M.D.;  Location: SURGERY SAME DAY [facility];  Service: Ophthalmology     Social History     Tobacco Use   • Smoking status: Never Smoker   • Smokeless tobacco: Never Used   Substance Use Topics   • Alcohol use: Yes     Comment: Socially    • Drug use: No     Social History     Social History Narrative   • Not on file     Family History   Problem Relation Age of Onset   • Hypertension Mother    • Diabetes Father    • No Known Problems Sister    • No Known Problems Brother    • No Known Problems Brother    • No Known Problems Sister    • No Known Problems Maternal Grandmother    • No Known Problems Maternal Grandfather    • No Known Problems Paternal Grandmother    • No Known Problems Paternal Grandfather      ROS:   Constitutional:  Negative for fever, chills, unexpected weight change, night sweats, body aches, sleep issues, and fatigue/generalized weakness.   HEENT:  Negative for headaches, vision changes,  "hearing changes, ear pain, tinnitus, ear discharge, rhinorrhea, sinus congestion, sneezing, sore throat, and neck pain.    Respiratory:  Negative for cough, shortness of breath, sputum production, hemoptysis, chest congestion, dyspnea, wheezing, and crackles.    Cardiovascular:  Negative for chest pain, palpitations, AGUILA, paroxsymal nocturnal dyspnea, orthopnea, and bilateral lower extremity edema.   Gastrointestinal: Positive for heartburn with spicy foods and coffee. Negative for nausea, vomiting, abdominal pain, hematochezia, melena, diarrhea, constipation, and greasy/foul-smelling stools.   Genitourinary:  Negative for dysuria, nocturia, polyuria, hematuria, pyuria, urinary urgency, urinary frequency, and urinary incontinence.   Musculoskeletal:  Negative for myalgias, back pain, and joint pain.   Skin:  Negative for rash, sores, lumps, itching, cyanotic skin color change.   Neurological:  Negative for dizziness, tingling, tremors, focal sensory deficit, focal weakness and headaches.   Endo/Heme/Allergies:  Does not bruise/bleed easily. Denies cold/heat intolerance.   Psychiatric/Behavioral: Negative for depression, suicidal/homicidal ideation and memory loss.        Exam: /82 (BP Location: Left arm, Patient Position: Sitting, BP Cuff Size: Adult)   Pulse 85   Temp 36.2 °C (97.2 °F) (Temporal)   Ht 1.753 m (5' 9\")   Wt 102 kg (224 lb)   SpO2 95%  Body mass index is 33.08 kg/m².    General:  Normal appearing. No distress.  HEENT:  Normocephalic. Eyes conjunctiva clear lids without ptosis, pupils equal and reactive to light accommodation, ears normal shape and contour, canals are clear bilaterally, tympanic membranes are benign, nasal mucosa benign, oropharynx is without erythema, edema or exudates. Sinuses (frontal and maxillary) nontender to palpation.  Neck:  Supple without JVD or bruit. Thyroid is not enlarged.  Pulmonary:  Clear to ausculation.  Normal effort. No rales, ronchi, or " wheezing.  Cardiovascular:  Regular rate and rhythm without murmur. Carotid and radial pulses are intact and equal bilaterally.  Abdomen:  Soft, nontender, nondistended. Normal bowel sounds.   Neurologic:  Grossly nonfocal.  Lymph:  No cervical or supraclavicular lymph nodes are palpable.  Skin:  Warm and dry.  No obvious lesions.  Musculoskeletal:  Normal gait. No extremity cyanosis, clubbing, or edema.  Psych:  Normal mood and affect. Alert and oriented x3. Judgment and insight is normal.     Assessment/Plan:  1. Establishing care with new doctor, encounter for    2. Essential hypertension  New problem to examiner, chronic problem for the patient.  Continue amlodipine 10 mg/day, refill sent to pharmacy.  Encourage patient to monitor blood pressure regularly at home, keep log, bring log and home monitor to future appointments.  Due for updated annual labs in December 2021.  - Comp Metabolic Panel; Future  - TSH WITH REFLEX TO FT4; Future  - amLODIPine (NORVASC) 10 MG Tab; Take 1 tablet by mouth every day.  Dispense: 90 tablet; Refill: 3    3. Gastroesophageal reflux disease without esophagitis  New problem to examiner, chronic problem for the patient.  Continue omeprazole 20 mg/day, refill sent to pharmacy.  Continue to avoid triggers, especially spicy foods and coffee.  Due for updated annual labs in December 2021.  - CBC WITH DIFFERENTIAL; Future  - Comp Metabolic Panel; Future  - TSH WITH REFLEX TO FT4; Future  - omeprazole (PRILOSEC) 20 MG delayed-release capsule; Take 1 capsule by mouth every day.  Dispense: 90 capsule; Refill: 3    4. Mixed hyperlipidemia  New problem to examiner, chronic problem for the patient, not on medication for this issue.  Encouraged diet high in fruits, vegetables, and fiber. And a diet low in salt, refined carbohydrates, cholesterol, saturated fat, and trans fatty acids.    Encouraged a minimum of 30 minutes of moderate intensity aerobic exercise (eg, brisk walking) is recommended  on five days each week. Or 30 minutes of vigorous-intensity aerobic exercise (eg, jogging) on three days each week.   Due for updated lab work in December 2021.  - Comp Metabolic Panel; Future  - Lipid Profile; Future  - TSH WITH REFLEX TO FT4; Future    5. Class 1 obesity without serious comorbidity with body mass index (BMI) of 33.0 to 33.9 in adult, unspecified obesity type  New problem to examiner, chronic problem for the patient.  Chronic, ongoing.  Encouraged diet high in fruits, vegetables, and fiber. And a diet low in salt, refined carbohydrates, cholesterol, saturated fat, and trans fatty acids.    Encouraged a minimum of 30 minutes of moderate intensity aerobic exercise (eg, brisk walking) is recommended on five days each week. Or 30 minutes of vigorous-intensity aerobic exercise (eg, jogging) on three days each week.   Patient's body mass index is 33.08 kg/m². Exercise and nutrition counseling were performed at this visit.  Due for updated annual labs in December 2021.  - CBC WITH DIFFERENTIAL; Future  - Comp Metabolic Panel; Future  - Lipid Profile; Future  - TSH WITH REFLEX TO FT4; Future    6. Routine health maintenance  Due for updated annual labs in December 2021.  - CBC WITH DIFFERENTIAL; Future  - Comp Metabolic Panel; Future  - Lipid Profile; Future  - TSH WITH REFLEX TO FT4; Future     Educated in proper administration of medication(s) ordered today including safety, possible SE, risks, benefits, rationale and alternatives to therapy.   Supportive care, differential diagnoses, and indications for immediate follow-up discussed with patient.    Pathogenesis of diagnosis discussed including typical length and natural progression.    Instructed to return to clinic or nearest emergency department for any change in condition, further concerns, or worsening of symptoms.  Patient states understanding of the plan of care and discharge instructions.    Return in about 37 weeks (around 12/20/2021) for  Preventative Annual, Follow up Labs.    Please note that this dictation was created using voice recognition software. I have made every reasonable attempt to correct obvious errors, but I expect that there are errors of grammar and possibly content that I did not discover before finalizing the note.

## 2021-04-05 NOTE — ASSESSMENT & PLAN NOTE
This is a new problem to the examiner. Chronic, ongoing. Continues omeprazole 20 mg/day, denies side effects from the medication. Has been taking the medication for about two years. Reports that coffee and spicy food is a trigger for acid reflux. Denies cough, hoarseness, globus sensation, shortness of breath, sore throat, early satiety, unintentional weight loss, choking, dysphagia, persistent burning pain in chest or upper abdomen, nausea, vomiting, melena.

## 2021-04-05 NOTE — ASSESSMENT & PLAN NOTE
This is a new problem to the examiner. Chronic, ongoing without medication. Last lipid complete 12/30/2020: total cholesterol 208, triglycerides 64, HDL 45,  Reports that he eats most meals at home. Reports that his diet is overall healthy. 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  The ASCVD Risk score ([place] SILVIO Jr, et al., 2013): 3.56%

## 2021-04-05 NOTE — ASSESSMENT & PLAN NOTE
This is a new problem to the examiner. Chronic, ongoing. Continues amlodipine 10 mg/day, denies side effects of the medication. Checks blood pressure weekly when out grocery shopping and it is good. Reports that blood pressure tends to be higher when in the doctor office. The patient denies chest pain, shortness of breath, headaches, dizziness, blurry vision, or dyspnea on exertion.

## 2021-04-20 ENCOUNTER — APPOINTMENT (OUTPATIENT)
Dept: RADIOLOGY | Facility: MEDICAL CENTER | Age: 46
DRG: 964 | End: 2021-04-20
Payer: COMMERCIAL

## 2021-04-20 ENCOUNTER — APPOINTMENT (OUTPATIENT)
Dept: RADIOLOGY | Facility: MEDICAL CENTER | Age: 46
DRG: 964 | End: 2021-04-20
Attending: EMERGENCY MEDICINE
Payer: COMMERCIAL

## 2021-04-20 ENCOUNTER — HOSPITAL ENCOUNTER (INPATIENT)
Facility: MEDICAL CENTER | Age: 46
LOS: 3 days | DRG: 964 | End: 2021-04-23
Attending: EMERGENCY MEDICINE | Admitting: SURGERY
Payer: COMMERCIAL

## 2021-04-20 ENCOUNTER — NON-PROVIDER VISIT (OUTPATIENT)
Dept: OCCUPATIONAL MEDICINE | Facility: CLINIC | Age: 46
End: 2021-04-20
Payer: COMMERCIAL

## 2021-04-20 DIAGNOSIS — S02.19XB: ICD-10-CM

## 2021-04-20 DIAGNOSIS — S27.0XXA TRAUMATIC PNEUMOTHORAX, INITIAL ENCOUNTER: ICD-10-CM

## 2021-04-20 DIAGNOSIS — Z02.83 ENCOUNTER FOR DRUG SCREENING: ICD-10-CM
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
## 2021-04-20 DIAGNOSIS — J93.9 PNEUMOTHORAX, LEFT: ICD-10-CM

## 2021-04-20 DIAGNOSIS — S22.42XA CLOSED FRACTURE OF MULTIPLE RIBS OF LEFT SIDE, INITIAL ENCOUNTER: ICD-10-CM

## 2021-04-20 PROBLEM — Z11.9 SCREENING EXAMINATION FOR INFECTIOUS DISEASE: Status: ACTIVE | Noted: 2021-04-20

## 2021-04-20 PROBLEM — R33.9 URINARY RETENTION: Status: ACTIVE | Noted: 2021-04-20

## 2021-04-20 PROBLEM — T14.90XA TRAUMA: Status: ACTIVE | Noted: 2021-04-20

## 2021-04-20 PROBLEM — J98.2 PNEUMOMEDIASTINUM (HCC): Status: ACTIVE | Noted: 2021-04-20

## 2021-04-20 PROBLEM — S22.49XA CLOSED FRACTURE OF MULTIPLE RIBS: Status: ACTIVE | Noted: 2021-04-20

## 2021-04-20 PROBLEM — S01.01XA SCALP LACERATION, INITIAL ENCOUNTER: Status: ACTIVE | Noted: 2021-04-20

## 2021-04-20 PROBLEM — Z53.09 CONTRAINDICATION TO DEEP VEIN THROMBOSIS (DVT) PROPHYLAXIS: Status: ACTIVE | Noted: 2021-04-20

## 2021-04-20 PROBLEM — S02.91XA SKULL FRACTURE (HCC): Status: ACTIVE | Noted: 2021-04-20

## 2021-04-20 LAB
ABO + RH BLD: NORMAL
ABO GROUP BLD: NORMAL
ALBUMIN SERPL BCP-MCNC: 4.5 G/DL (ref 3.2–4.9)
ALBUMIN/GLOB SERPL: 1.5 G/DL
ALP SERPL-CCNC: 80 U/L (ref 30–99)
ALT SERPL-CCNC: 44 U/L (ref 2–50)
ANION GAP SERPL CALC-SCNC: 13 MMOL/L (ref 7–16)
APTT PPP: 27.7 SEC (ref 24.7–36)
AST SERPL-CCNC: 42 U/L (ref 12–45)
BILIRUB SERPL-MCNC: 0.4 MG/DL (ref 0.1–1.5)
BLD GP AB SCN SERPL QL: NORMAL
BUN SERPL-MCNC: 14 MG/DL (ref 8–22)
CALCIUM SERPL-MCNC: 8.7 MG/DL (ref 8.5–10.5)
CHLORIDE SERPL-SCNC: 102 MMOL/L (ref 96–112)
CO2 SERPL-SCNC: 26 MMOL/L (ref 20–33)
CREAT SERPL-MCNC: 0.85 MG/DL (ref 0.5–1.4)
ERYTHROCYTE [DISTWIDTH] IN BLOOD BY AUTOMATED COUNT: 41.1 FL (ref 35.9–50)
ETHANOL BLD-MCNC: <10.1 MG/DL (ref 0–10)
GLOBULIN SER CALC-MCNC: 3.1 G/DL (ref 1.9–3.5)
GLUCOSE BLD-MCNC: 150 MG/DL (ref 65–99)
GLUCOSE BLD-MCNC: 168 MG/DL (ref 65–99)
GLUCOSE SERPL-MCNC: 169 MG/DL (ref 65–99)
HCT VFR BLD AUTO: 40.2 % (ref 42–52)
HGB BLD-MCNC: 13.8 G/DL (ref 14–18)
INR PPP: 1.08 (ref 0.87–1.13)
MCH RBC QN AUTO: 31.4 PG (ref 27–33)
MCHC RBC AUTO-ENTMCNC: 34.3 G/DL (ref 33.7–35.3)
MCV RBC AUTO: 91.4 FL (ref 81.4–97.8)
PLATELET # BLD AUTO: 285 K/UL (ref 164–446)
PMV BLD AUTO: 11.2 FL (ref 9–12.9)
POTASSIUM SERPL-SCNC: 3.1 MMOL/L (ref 3.6–5.5)
PROT SERPL-MCNC: 7.6 G/DL (ref 6–8.2)
PROTHROMBIN TIME: 14.4 SEC (ref 12–14.6)
RBC # BLD AUTO: 4.4 M/UL (ref 4.7–6.1)
RH BLD: NORMAL
SARS-COV+SARS-COV-2 AG RESP QL IA.RAPID: NOTDETECTED
SARS-COV-2 RNA RESP QL NAA+PROBE: NOTDETECTED
SODIUM SERPL-SCNC: 141 MMOL/L (ref 135–145)
SPECIMEN SOURCE: NORMAL
SPECIMEN SOURCE: NORMAL
WBC # BLD AUTO: 13.4 K/UL (ref 4.8–10.8)

## 2021-04-20 PROCEDURE — G0390 TRAUMA RESPONS W/HOSP CRITI: HCPCS

## 2021-04-20 PROCEDURE — C9803 HOPD COVID-19 SPEC COLLECT: HCPCS | Performed by: EMERGENCY MEDICINE

## 2021-04-20 PROCEDURE — 700111 HCHG RX REV CODE 636 W/ 250 OVERRIDE (IP): Performed by: EMERGENCY MEDICINE

## 2021-04-20 PROCEDURE — 700102 HCHG RX REV CODE 250 W/ 637 OVERRIDE(OP): Performed by: NURSE PRACTITIONER

## 2021-04-20 PROCEDURE — 700105 HCHG RX REV CODE 258: Performed by: NURSE PRACTITIONER

## 2021-04-20 PROCEDURE — 99291 CRITICAL CARE FIRST HOUR: CPT

## 2021-04-20 PROCEDURE — 700117 HCHG RX CONTRAST REV CODE 255: Performed by: EMERGENCY MEDICINE

## 2021-04-20 PROCEDURE — 99026 IN-HOSPITAL ON CALL SERVICE: CPT | Performed by: PREVENTIVE MEDICINE

## 2021-04-20 PROCEDURE — 700111 HCHG RX REV CODE 636 W/ 250 OVERRIDE (IP): Performed by: NURSE PRACTITIONER

## 2021-04-20 PROCEDURE — 80053 COMPREHEN METABOLIC PANEL: CPT

## 2021-04-20 PROCEDURE — 70496 CT ANGIOGRAPHY HEAD: CPT

## 2021-04-20 PROCEDURE — A9270 NON-COVERED ITEM OR SERVICE: HCPCS | Performed by: NURSE PRACTITIONER

## 2021-04-20 PROCEDURE — U0005 INFEC AGEN DETEC AMPLI PROBE: HCPCS

## 2021-04-20 PROCEDURE — 86901 BLOOD TYPING SEROLOGIC RH(D): CPT

## 2021-04-20 PROCEDURE — 304999 HCHG REPAIR-SIMPLE/INTERMED LEVEL 1

## 2021-04-20 PROCEDURE — 72131 CT LUMBAR SPINE W/O DYE: CPT

## 2021-04-20 PROCEDURE — 71260 CT THORAX DX C+: CPT

## 2021-04-20 PROCEDURE — 85027 COMPLETE CBC AUTOMATED: CPT

## 2021-04-20 PROCEDURE — 96375 TX/PRO/DX INJ NEW DRUG ADDON: CPT

## 2021-04-20 PROCEDURE — 71045 X-RAY EXAM CHEST 1 VIEW: CPT

## 2021-04-20 PROCEDURE — U0003 INFECTIOUS AGENT DETECTION BY NUCLEIC ACID (DNA OR RNA); SEVERE ACUTE RESPIRATORY SYNDROME CORONAVIRUS 2 (SARS-COV-2) (CORONAVIRUS DISEASE [COVID-19]), AMPLIFIED PROBE TECHNIQUE, MAKING USE OF HIGH THROUGHPUT TECHNOLOGIES AS DESCRIBED BY CMS-2020-01-R: HCPCS

## 2021-04-20 PROCEDURE — 86850 RBC ANTIBODY SCREEN: CPT

## 2021-04-20 PROCEDURE — 96365 THER/PROPH/DIAG IV INF INIT: CPT

## 2021-04-20 PROCEDURE — 305308 HCHG STAPLER,SKIN,DISP.

## 2021-04-20 PROCEDURE — 700101 HCHG RX REV CODE 250: Performed by: NURSE PRACTITIONER

## 2021-04-20 PROCEDURE — 72125 CT NECK SPINE W/O DYE: CPT

## 2021-04-20 PROCEDURE — 0HQ0XZZ REPAIR SCALP SKIN, EXTERNAL APPROACH: ICD-10-PCS | Performed by: EMERGENCY MEDICINE

## 2021-04-20 PROCEDURE — 770022 HCHG ROOM/CARE - ICU (200)

## 2021-04-20 PROCEDURE — 82077 ASSAY SPEC XCP UR&BREATH IA: CPT

## 2021-04-20 PROCEDURE — 70450 CT HEAD/BRAIN W/O DYE: CPT

## 2021-04-20 PROCEDURE — 51798 US URINE CAPACITY MEASURE: CPT

## 2021-04-20 PROCEDURE — 87426 SARSCOV CORONAVIRUS AG IA: CPT

## 2021-04-20 PROCEDURE — 72128 CT CHEST SPINE W/O DYE: CPT

## 2021-04-20 PROCEDURE — 303747 HCHG EXTRA SUTURE

## 2021-04-20 PROCEDURE — 85610 PROTHROMBIN TIME: CPT

## 2021-04-20 PROCEDURE — 85730 THROMBOPLASTIN TIME PARTIAL: CPT

## 2021-04-20 PROCEDURE — 86900 BLOOD TYPING SEROLOGIC ABO: CPT

## 2021-04-20 PROCEDURE — 82962 GLUCOSE BLOOD TEST: CPT | Mod: 91

## 2021-04-20 RX ORDER — BACITRACIN ZINC AND POLYMYXIN B SULFATE 500; 1000 [USP'U]/G; [USP'U]/G
OINTMENT TOPICAL 3 TIMES DAILY
Status: DISCONTINUED | OUTPATIENT
Start: 2021-04-20 | End: 2021-04-23 | Stop reason: HOSPADM

## 2021-04-20 RX ORDER — CEFAZOLIN SODIUM 2 G/100ML
2 INJECTION, SOLUTION INTRAVENOUS ONCE
Status: COMPLETED | OUTPATIENT
Start: 2021-04-20 | End: 2021-04-20

## 2021-04-20 RX ORDER — AMOXICILLIN 250 MG
1 CAPSULE ORAL
Status: DISCONTINUED | OUTPATIENT
Start: 2021-04-20 | End: 2021-04-23 | Stop reason: HOSPADM

## 2021-04-20 RX ORDER — LIDOCAINE 50 MG/G
1-3 PATCH TOPICAL DAILY
Status: DISCONTINUED | OUTPATIENT
Start: 2021-04-20 | End: 2021-04-21

## 2021-04-20 RX ORDER — MORPHINE SULFATE 4 MG/ML
4 INJECTION, SOLUTION INTRAMUSCULAR; INTRAVENOUS
Status: DISCONTINUED | OUTPATIENT
Start: 2021-04-20 | End: 2021-04-23 | Stop reason: HOSPADM

## 2021-04-20 RX ORDER — DOCUSATE SODIUM 100 MG/1
100 CAPSULE, LIQUID FILLED ORAL 2 TIMES DAILY
Status: DISCONTINUED | OUTPATIENT
Start: 2021-04-20 | End: 2021-04-23 | Stop reason: HOSPADM

## 2021-04-20 RX ORDER — SODIUM CHLORIDE 9 MG/ML
INJECTION, SOLUTION INTRAVENOUS CONTINUOUS
Status: DISCONTINUED | OUTPATIENT
Start: 2021-04-20 | End: 2021-04-21

## 2021-04-20 RX ORDER — ONDANSETRON 2 MG/ML
4 INJECTION INTRAMUSCULAR; INTRAVENOUS ONCE
Status: COMPLETED | OUTPATIENT
Start: 2021-04-20 | End: 2021-04-20

## 2021-04-20 RX ORDER — GABAPENTIN 100 MG/1
100 CAPSULE ORAL 3 TIMES DAILY
Status: DISCONTINUED | OUTPATIENT
Start: 2021-04-20 | End: 2021-04-21

## 2021-04-20 RX ORDER — FAMOTIDINE 20 MG/1
20 TABLET, FILM COATED ORAL 2 TIMES DAILY
Status: DISCONTINUED | OUTPATIENT
Start: 2021-04-20 | End: 2021-04-23 | Stop reason: HOSPADM

## 2021-04-20 RX ORDER — HYDROMORPHONE HYDROCHLORIDE 1 MG/ML
1 INJECTION, SOLUTION INTRAMUSCULAR; INTRAVENOUS; SUBCUTANEOUS ONCE
Status: COMPLETED | OUTPATIENT
Start: 2021-04-20 | End: 2021-04-20

## 2021-04-20 RX ORDER — METAXALONE 800 MG/1
800 TABLET ORAL 3 TIMES DAILY
Status: DISCONTINUED | OUTPATIENT
Start: 2021-04-20 | End: 2021-04-23 | Stop reason: HOSPADM

## 2021-04-20 RX ORDER — OMEPRAZOLE 20 MG/1
20 CAPSULE, DELAYED RELEASE ORAL DAILY
COMMUNITY
End: 2021-12-22

## 2021-04-20 RX ORDER — OXYCODONE HYDROCHLORIDE 10 MG/1
10 TABLET ORAL
Status: DISCONTINUED | OUTPATIENT
Start: 2021-04-20 | End: 2021-04-23 | Stop reason: HOSPADM

## 2021-04-20 RX ORDER — BISACODYL 10 MG
10 SUPPOSITORY, RECTAL RECTAL
Status: DISCONTINUED | OUTPATIENT
Start: 2021-04-20 | End: 2021-04-23 | Stop reason: HOSPADM

## 2021-04-20 RX ORDER — AMLODIPINE BESYLATE 10 MG/1
10 TABLET ORAL DAILY
COMMUNITY
End: 2021-12-22

## 2021-04-20 RX ORDER — ONDANSETRON 2 MG/ML
4 INJECTION INTRAMUSCULAR; INTRAVENOUS EVERY 4 HOURS PRN
Status: DISCONTINUED | OUTPATIENT
Start: 2021-04-20 | End: 2021-04-23 | Stop reason: HOSPADM

## 2021-04-20 RX ORDER — POLYETHYLENE GLYCOL 3350 17 G/17G
1 POWDER, FOR SOLUTION ORAL 2 TIMES DAILY
Status: DISCONTINUED | OUTPATIENT
Start: 2021-04-20 | End: 2021-04-23 | Stop reason: HOSPADM

## 2021-04-20 RX ORDER — ACETAMINOPHEN 500 MG
1000 TABLET ORAL EVERY 6 HOURS
Status: DISCONTINUED | OUTPATIENT
Start: 2021-04-20 | End: 2021-04-23 | Stop reason: HOSPADM

## 2021-04-20 RX ORDER — ACETAMINOPHEN 500 MG
1000 TABLET ORAL EVERY 6 HOURS PRN
Status: DISCONTINUED | OUTPATIENT
Start: 2021-04-25 | End: 2021-04-23 | Stop reason: HOSPADM

## 2021-04-20 RX ORDER — DEXTROSE MONOHYDRATE 25 G/50ML
50 INJECTION, SOLUTION INTRAVENOUS
Status: DISCONTINUED | OUTPATIENT
Start: 2021-04-20 | End: 2021-04-21

## 2021-04-20 RX ORDER — ENEMA 19; 7 G/133ML; G/133ML
1 ENEMA RECTAL
Status: DISCONTINUED | OUTPATIENT
Start: 2021-04-20 | End: 2021-04-23 | Stop reason: HOSPADM

## 2021-04-20 RX ORDER — AMOXICILLIN 250 MG
1 CAPSULE ORAL NIGHTLY
Status: DISCONTINUED | OUTPATIENT
Start: 2021-04-20 | End: 2021-04-23 | Stop reason: HOSPADM

## 2021-04-20 RX ORDER — OXYCODONE HYDROCHLORIDE 5 MG/1
5 TABLET ORAL
Status: DISCONTINUED | OUTPATIENT
Start: 2021-04-20 | End: 2021-04-23 | Stop reason: HOSPADM

## 2021-04-20 RX ADMIN — ACETAMINOPHEN 1000 MG: 500 TABLET ORAL at 23:39

## 2021-04-20 RX ADMIN — SODIUM CHLORIDE: 9 INJECTION, SOLUTION INTRAVENOUS at 16:30

## 2021-04-20 RX ADMIN — MORPHINE SULFATE 4 MG: 4 INJECTION INTRAVENOUS at 16:14

## 2021-04-20 RX ADMIN — GABAPENTIN 100 MG: 100 CAPSULE ORAL at 18:12

## 2021-04-20 RX ADMIN — IOHEXOL 90 ML: 350 INJECTION, SOLUTION INTRAVENOUS at 15:59

## 2021-04-20 RX ADMIN — FAMOTIDINE 20 MG: 20 TABLET ORAL at 18:12

## 2021-04-20 RX ADMIN — FENTANYL CITRATE 100 MCG: 50 INJECTION, SOLUTION INTRAMUSCULAR; INTRAVENOUS at 13:00

## 2021-04-20 RX ADMIN — ONDANSETRON 4 MG: 2 INJECTION INTRAMUSCULAR; INTRAVENOUS at 13:52

## 2021-04-20 RX ADMIN — ACETAMINOPHEN 1000 MG: 500 TABLET ORAL at 16:39

## 2021-04-20 RX ADMIN — METAXALONE 800 MG: 800 TABLET ORAL at 16:39

## 2021-04-20 RX ADMIN — LIDOCAINE 2 PATCH: 50 PATCH TOPICAL at 16:39

## 2021-04-20 RX ADMIN — IOHEXOL 100 ML: 350 INJECTION, SOLUTION INTRAVENOUS at 13:24

## 2021-04-20 RX ADMIN — HYDROMORPHONE HYDROCHLORIDE 1 MG: 1 INJECTION, SOLUTION INTRAMUSCULAR; INTRAVENOUS; SUBCUTANEOUS at 13:52

## 2021-04-20 RX ADMIN — INSULIN HUMAN 1 UNITS: 100 INJECTION, SOLUTION PARENTERAL at 18:10

## 2021-04-20 RX ADMIN — DOCUSATE SODIUM 100 MG: 100 CAPSULE ORAL at 18:12

## 2021-04-20 RX ADMIN — MORPHINE SULFATE 4 MG: 4 INJECTION INTRAVENOUS at 18:15

## 2021-04-20 RX ADMIN — DOCUSATE SODIUM 50 MG AND SENNOSIDES 8.6 MG 1 TABLET: 8.6; 5 TABLET, FILM COATED ORAL at 20:04

## 2021-04-20 RX ADMIN — CEFAZOLIN SODIUM 2 G: 2 INJECTION, SOLUTION INTRAVENOUS at 13:53

## 2021-04-20 ASSESSMENT — PATIENT HEALTH QUESTIONNAIRE - PHQ9
1. LITTLE INTEREST OR PLEASURE IN DOING THINGS: NOT AT ALL
2. FEELING DOWN, DEPRESSED, IRRITABLE, OR HOPELESS: NOT AT ALL
SUM OF ALL RESPONSES TO PHQ9 QUESTIONS 1 AND 2: 0

## 2021-04-20 ASSESSMENT — PAIN DESCRIPTION - PAIN TYPE
TYPE: ACUTE PAIN

## 2021-04-20 ASSESSMENT — COPD QUESTIONNAIRES
DO YOU EVER COUGH UP ANY MUCUS OR PHLEGM?: NO/ONLY WITH OCCASIONAL COLDS OR INFECTIONS
COPD SCREENING SCORE: 0
HAVE YOU SMOKED AT LEAST 100 CIGARETTES IN YOUR ENTIRE LIFE: NO/DON'T KNOW
DURING THE PAST 4 WEEKS HOW MUCH DID YOU FEEL SHORT OF BREATH: NONE/LITTLE OF THE TIME

## 2021-04-20 ASSESSMENT — FIBROSIS 4 INDEX: FIB4 SCORE: 1

## 2021-04-20 NOTE — PROGRESS NOTES
2 RN skin note  1.5 inch laceration on posterior head, no other skin issues noted      Belongings    Socks  Blue boxers  Brown boots  Blue jeans  Brown belt  Cell phone clip  Cell phone  Wallet with cards no cash

## 2021-04-20 NOTE — ASSESSMENT & PLAN NOTE
Small anterior pneumomediastinum.  Cardiac monitoring negative for ectopy.  Aggressive pulmonary hygiene and multimodal pain management and serial chest radiography.

## 2021-04-20 NOTE — ED TRIAGE NOTES
Chief Complaint   Patient presents with   • Trauma Green     Fell 12 feet off scaffolding onto concrete, hitting the back of his head with + LOC for a couple minutes, approximately 500mL of blood loss. No blood thinning medications.     Patient BIB [facility] for above complaint. Patient received 500mL IV fluids and 100mcg Fentanyl PTA, BSFS 113 and vitals stable during transport with GCS 15. Review trauma charting for assessment findings.    Patient's head laceration sutured in trauma bay by ERP.

## 2021-04-20 NOTE — ASSESSMENT & PLAN NOTE
Admission SARS-CoV-2 testing negative. Repeat SARS-CoV-2 testing not indicated. Isolation precautions de-escalated.

## 2021-04-20 NOTE — ASSESSMENT & PLAN NOTE
No indication for chest tube at admit.  Minute left pneumothorax.  Supplemental oxygen to maintain SaO2 greater than 93%.  Aggressive pulmonary hygiene and serial chest radiography.  4/21 Not visualized on CXR.

## 2021-04-20 NOTE — LETTER
"  FORM C-4:  EMPLOYEE’S CLAIM FOR COMPENSATION/ REPORT OF INITIAL TREATMENT  EMPLOYEE’S CLAIM - PROVIDE ALL INFORMATION REQUESTED   First Name Nadeem Last Name Tj Araujo Birthdate 1975  Sex male Claim Number   Home Address [address]   [facility]             Zip [address]                                   Age  46 y.o. Height  1.803 m (5' 11\") Weight  100 kg (221 lb) N  [id number]   Mailing Address [address]  [facility]              Zip [address] Telephone  [phone] (home)  Primary Language Spoken  English   Insurer   Third Party   [employer] Employee's Occupation (Job Title) When Injury or Occupational Disease Occurred  [employer]   Employer's Name [employer] Telephone [phone]    Employer Address [address] [facility] [[address]] Zip [address]   Date of Injury  4/20/2021       Hour of Injury  12:00 PM Date Employer Notified  4/20/2021 Last Day of Work after Injury or Occupational Disease  4/20/2021 Supervisor to Whom Injury Reported  Mac ZURITA   Address or Location of Accident (if applicable) [Unknown job site]   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 Be specific and answer in detail. Use additional sheet if necessary)  can't remember   If you believe that you have an occupational disease, when did you first have knowledge of the disability and it relationship to your employment? na Witnesses to the Accident  guys on job site   Nature of Injury or Occupational Disease  Workers' Compensation Part(s) of Body Injured or Affected  Skull, N/A, N/A    I CERTIFY THAT THE ABOVE IS TRUE AND CORRECT TO THE BEST OF MY KNOWLEDGE AND THAT I HAVE PROVIDED THIS INFORMATION IN ORDER TO OBTAIN THE BENEFITS OF [place]).  I HEREBY AUTHORIZE ANY " PHYSICIAN, CHIROPRACTOR, SURGEON, PRACTITIONER, OR OTHER PERSON, ANY HOSPITAL, INCLUDING [facility] OR [facility], ANY MEDICAL SERVICE ORGANIZATION, ANY INSURANCE COMPANY, OR OTHER INSTITUTION OR ORGANIZATION TO RELEASE TO EACH OTHER, ANY MEDICAL OR OTHER INFORMATION, INCLUDING BENEFITS PAID OR PAYABLE, PERTINENT TO THIS INJURY OR DISEASE, EXCEPT INFORMATION RELATIVE TO DIAGNOSIS, TREATMENT AND/OR COUNSELING FOR AIDS, PSYCHOLOGICAL CONDITIONS, ALCOHOL OR CONTROLLED SUBSTANCES, FOR WHICH I MUST GIVE SPECIFIC AUTHORIZATION.  A PHOTOSTAT OF THIS AUTHORIZATION SHALL BE AS VALID AS THE ORIGINAL.  Date                                      Place                                [facility] [place]                                             Employee’s Signature   THIS REPORT MUST BE COMPLETED AND MAILED WITHIN 3 WORKING DAYS OF TREATMENT   Place [facility]                       Name of Facility [facility]   Date  4/14/2021 Diagnosis  (S02.19XB) Open fracture of posterior fossa of skull, initial encounter ([facility])  (S22.42XA) Closed fracture of multiple ribs of left side, initial encounter  (J93.9) Pneumothorax, left  (W12.XXXA) Fall from scaffold, initial encounter Is there evidence the injured employee was under the influence of alcohol and/or another controlled substance at the time of accident?   Hour  9:15 AM Description of Injury or Disease  Open fracture of posterior fossa of skull, initial encounter ([facility])  Closed fracture of multiple ribs of left side, initial encounter  Pneumothorax, left  Fall from scaffold, initial encounter     Treatment     Have you advised the patient to remain off work five days or more?             X-Ray Findings    If Yes   From Date    To Date      From information given by the employee, together with medical evidence, can you directly connect this injury or occupational disease as job incurred?   If No, is employee capable of: Full Duty    Modified Duty      Is  "additional medical care by a physician indicated?   If Modified Duty, Specify any Limitations / Restrictions       Do you know of any previous injury or disease contributing to this condition or occupational disease?      Date 4/21/2021 Print Doctor’s Name Carly Bryant Pineda PERFECTO certify the employer’s copy of this form was mailed on:   Address [address] [place] [address]  [place] [id number] [phone] INSURER’S USE ONLY   Provider’s Tax ID Number [id number] Telephone Dept: [phone]    Doctor’s Signature   Degree        Form C-4 (rev.10/07)                                                       BRIEF DESCRIPTION OF RIGHTS AND BENEFITS  (Pursuant to [place] 616[place])    Notice of Injury or Occupational Disease (Incident Report Form C-1): If an injury or occupational disease (OD) arises out of and in the course of employment, you must provide written notice to your employer as soon as practicable, but no later than 7 days after the accident or OD. Your employer shall maintain a sufficient supply of the required forms.    Claim for Compensation (Form C-4): If medical treatment is sought, the form C-4 is available at the place of initial treatment. A completed \"Claim for Compensation\" (Form C-4) must be filed within 90 days after an accident or OD. The treating physician or chiropractor must, within 3 working days after treatment, complete and mail to the employer, the employer's insurer and third-party , the Claim for Compensation.    Medical Treatment: If you require medical treatment for your on-the-job injury or OD, you may be required to select a physician or chiropractor from a list provided by your workers’ compensation insurer, if it has contracted with an Organization for Managed Care (MCO) or Preferred Provider Organization (PPO) or providers of health care. If your employer has not entered into a contract with an MCO or PPO, you may select a physician or chiropractor from the Panel of Physicians and " Chiropractors. Any medical costs related to your industrial injury or OD will be paid by your insurer.    Temporary Total Disability (TTD): If your doctor has certified that you are unable to work for a period of at least 5 consecutive days, or 5 cumulative days in a 20-day period, or places restrictions on you that your employer does not accommodate, you may be entitled to TTD compensation.    Temporary Partial Disability (TPD): If the wage you receive upon reemployment is less than the compensation for TTD to which you are entitled, the insurer may be required to pay you TPD compensation to make up the difference. TPD can only be paid for a maximum of 24 months.    Permanent Partial Disability (PPD): When your medical condition is stable and there is an indication of a PPD as a result of your injury or OD, within 30 days, your insurer must arrange for an evaluation by a rating physician or chiropractor to determine the degree of your PPD. The amount of your PPD award depends on the date of injury, the results of the PPD evaluation, your age and wage.    Permanent Total Disability (PTD): If you are medically certified by a treating physician or chiropractor as permanently and totally disabled and have been granted a PTD status by your insurer, you are entitled to receive monthly benefits not to exceed 66 2/3% of your average monthly wage. The amount of your PTD payments is subject to reduction if you previously received a lump-sum PPD award.    Vocational Rehabilitation Services: You may be eligible for vocational rehabilitation services if you are unable to return to the job due to a permanent physical impairment or permanent restrictions as a result of your injury or occupational disease.    Transportation and Per Augustina Reimbursement: You may be eligible for travel expenses and per augustina associated with medical treatment.    Reopening: You may be able to reopen your claim if your condition worsens after claim  closure.     Appeal Process: If you disagree with a written determination issued by the insurer or the insurer does not respond to your request, you may appeal to the Department of Administration, , by following the instructions contained in your determination letter. You must appeal the determination within 70 days from the date of the determination letter at [address], [address], [place], [place] [address], or [address], [place], [place] [address]. If you disagree with the  decision, you may appeal to the Department of Administration, . You must file your appeal within 30 days from the date of the  decision letter at [address], [address], [place], [place] [address], or [address], [address], [place], [place] [address]. If you disagree with a decision of an , you may file a petition for judicial review with the District Court. You must do so within 30 days of the Appeal Officer’s decision. You may be represented by an  at your own expense or you may contact the [facility] for possible representation.    [place]  for Injured Workers (NAIW): If you disagree with a  decision, you may request that NAIW represent you without charge at an  Hearing. For information regarding denial of benefits, you may contact the [facility] at: [address], [address], [place] [address], [phone], or [address], [address], [place] [address], [phone]    To File a Complaint with the Division: If you wish to file a complaint with the  of the Division of Industrial Relations (DIR),  please contact the Workers’ Compensation Section, [address], [address], [place], [place] [address], telephone [phone], or [address], [place], [place] [address], telephone [phone].    For assistance with Workers’  Compensation Issues: You may contact the [facility] Office for Consumer Health Assistance, [facility] [facility], [address] [facility] [address], [place] [address], Toll Free [phone], Web site: [email or web address]/[email or web address] E-mail: [email or web address]  D-2 (rev. 10/20)              __________________________________________________________________                                    _________________            Employee Name / Signature                                                                                                                            Date

## 2021-04-20 NOTE — ASSESSMENT & PLAN NOTE
Fractures of the posterior lateral left fifth, sixth, seventh, eighth, ninth and 10th ribs, the anterolateral fifth, sixth, seventh, eighth, ninth and 10th ribs, and the anterior left third and fourth ribs.  Aggressive pulmonary hygiene and multimodal pain management.

## 2021-04-20 NOTE — ED NOTES
Posterior head laceration repaired by ERP. First attempt using staples, which did not control bleeding, so the staples were removed and 2.0 ethilon was using to suture wound closed and control bleeding.

## 2021-04-20 NOTE — H&P
"TRAUMA HISTORY AND PHYSICAL    DATE OF SERVICE: 4/20/2021    ACTIVATION LEVEL: Green.     HISTORY OF PRESENT ILLNESS: The patient is a  46 year-old Mauritian speaking mostly male  who was injured after falling 12 feet from scaffolding onto the concrete below. He struck the back of his head and left chest wall.    The patient was triaged to [facility] in accordance with established pre hospital protocols. An expeditious primary and secondary survey with required adjuncts was conducted. See Trauma Narrator for full details.    Upon my arrival at bedside, the patient is awake and able to provide some history.  He reports shortness of breath and difficulty breathing from pain.    Language Line was utilized for my entire interaction with this patient.    PAST MEDICAL HISTORY:   Past Medical History:   Diagnosis Date   • Depression          PAST SURGICAL HISTORY: History reviewed. No pertinent surgical history.       ALLERGIES: Patient has no known allergies.       CURRENT MEDICATIONS:   Outpatient Medications Marked as Taking for the 4/20/21 encounter (Hospital Encounter)   Medication Sig   • omeprazole (PRILOSEC) 20 MG delayed-release capsule Take 20 mg by mouth every day.   • amLODIPine (NORVASC) 10 MG Tab Take 10 mg by mouth every day.       FAMILY HISTORY:   Reviewed and found to be non-contributory in regards to the above presentation    SOCIAL HISTORY:  reports that he has never smoked. He has never used smokeless tobacco. He reports current alcohol use. He reports that he does not use drugs.  Patient denies habitual use of alcohol, tobacco, and illicit drugs    REVIEW OF SYSTEMS:   Comprehensive review of systems is negative with the exception of the aforementioned HPI, PMH, and PSH bullets in accordance with CMS guidelines.    PHYSICAL EXAMINATION:   VITAL SIGNS:   · /87   Pulse 85   Temp 36.2 °C (97.1 °F)   Resp 18   Ht 1.803 m (5' 11\")   Wt 88.5 kg (195 lb)   SpO2 93%     GENERAL: "   · The patient appears stated age, is in no apparent distress, is well developed and well nourished    HEENT:  · HEAD: Posterior scalp laceration, well approximated with small underlying non-expanding hematoma.  · EARS: The ear canals and tympanic membranes are normal.Normal pinna bilaterally.    · EYES:  The pupils are equal, round, and reactive to light bilaterally. The extraocular muscles are intact bilaterally..    · NOSE: No rhinorrhea.  The bilateral nares are clear.  · THROAT: Oral mucosa is moist.  The oropharynx are clear.    FACE:   · The midface and jaw are stable. No malocclusion is evident.    NECK:    · The cervical spine was examined utilizing spinal motion restriction.   · No posterior midline cervical-spine tenderness, no evidence of intoxication, normal level of alertness ([place] Coma Scale 15), no focal neurologic deficit, and no painful distracting injuries..    CHEST:    · Inspection: Unlabored respirations, no intercostal retractions, paradoxical motion, or accessory muscle use.  · Palpation:  The chest is markedly tender with crepitance over the left lateral chest wall. The clavicles are non deformed bilaterally..  · Auscultation: clear to auscultation.    CARDIOVASCULAR:    · Auscultation: regular rate and rhythm.  · Peripheral Pulses: Normal.      ABDOMEN:    · Abdomen is soft, nontender, without organomegaly or masses.    BACK/PELVIS:    · The thoracolumbar spine was examined utilizing spinal motion restriction.   · Inspection and palpation reveal no significant tenderness, swelling, or deformity in the thoracolumbar region.  · The pelvis is stable.    RECTAL:  Deferred    GENITOURINARY:  The patient has normal external reproductive anatomy.    EXTREMITIES:  · RIGHT ARM: Without deformities, wounds, lacerations, or excoriations.  Full passive and active range of motion without pain.  · LEFT ARM: Without deformities, wounds, lacerations, or excoriations.  Full passive and active range of  motion without pain.  · RIGHT LEG: Without deformities, wounds, lacerations, or excoriations.  Full passive and active range of motion without pain.  · LEFT LEG: Without deformities, wounds, lacerations, or excoriations.  Full passive and active range of motion without pain.    NEUROLOGIC:    · [place] Coma Scale (GCS) 15.   · Neurologic examination revealed no focal deficits noted, mental status intact, cranial nerves II through XII intact, muscle tone normal, muscle strength normal, oriented for age x3.    SKIN:  · The skin is warm, dry and well purfused.    PSYCHIATRIC:   · The patient does not appear depressed or anxious.    LABORATORY VALUES:   Recent Labs     04/20/21  1249   WBC 13.4*   RBC 4.40*   HEMOGLOBIN 13.8*   HEMATOCRIT 40.2*   MCV 91.4   MCH 31.4   MCHC 34.3   RDW 41.1   PLATELETCT 285   MPV 11.2     Recent Labs     04/20/21  1249   SODIUM 141   POTASSIUM 3.1*   CHLORIDE 102   CO2 26   GLUCOSE 169*   BUN 14   CREATININE 0.85   CALCIUM 8.7     Recent Labs     04/20/21  1249   ASTSGOT 42   ALTSGPT 44   TBILIRUBIN 0.4   ALKPHOSPHAT 80   GLOBULIN 3.1   INR 1.08     Recent Labs     04/20/21  1249   APTT 27.7   INR 1.08        IMAGING:   CT-CHEST,ABDOMEN,PELVIS WITH   Final Result      Tiny left pneumothorax.      Fractures of the posterior lateral left fifth, sixth, seventh, eighth, ninth and 10th ribs, the anterolateral fifth, sixth, seventh, eighth, ninth and 10th ribs, and the anterior left third and fourth ribs.      No solid organ or vascular injury is identified.      Small anterior pneumomediastinum.      Findings discussed with Dr. Zapata.      CT-LSPINE W/O PLUS RECONS   Final Result      No fracture or subluxation is seen in the lumbar spine.                     CT-TSPINE W/O PLUS RECONS   Final Result      Fractures of the left sixth, seventh, eighth, ninth and 10th ribs.      Tiny left pneumothorax.         CT-CSPINE WITHOUT PLUS RECONS   Final Result      No CT evidence of acute cervical  spine abnormality.      Trace cervical thoracic junction anterolisthesis is most likely degenerative      Minute left pneumothorax      CT-HEAD W/O   Final Result      No acute intracranial hemorrhage is identified despite posterior calvarium 9 mm depressed skull fracture which is mildly comminuted      Moderate sinus inflammatory disease      DX-CHEST-LIMITED (1 VIEW)   Final Result      Fractures of the left sixth, seventh and ninth ribs.      No pneumothorax is identified.         CT-CTA HEAD WITH & W/O-POST PROCESS    (Results Pending)       IMPRESSION AND PLAN:  Pneumomediastinum (HCC)- (present on admission)  Assessment & Plan  Small anterior pneumomediastinum.  Cardiac monitoring.  Aggressive pulmonary hygiene and multimodal pain management and serial chest radiography.    Skull fracture (HCC)- (present on admission)  Assessment & Plan  Posterior calvarium 9 mm depressed skull fracture which is mildly comminuted.  Approximation with the sagittal sinus, at risk for thrombosis  No discernable underlying brain injury  CT Venogram PENDING  Definitive plan pending.  Ellis Correia MD. Neurosurgeon. [facility].    Closed fracture of multiple ribs of left side- (present on admission)  Assessment & Plan  Fractures of the posterior lateral left fifth, sixth, seventh, eighth, ninth and 10th ribs, the anterolateral fifth, sixth, seventh, eighth, ninth and 10th ribs, and the anterior left third and fourth ribs.  Aggressive pulmonary hygiene and multimodal pain management.    Screening examination for infectious disease- (present on admission)  Assessment & Plan  4/20 COVID-19 specimen sent. AIRBORNE & CONTACT/EYE ISOLATION implemented pending final SARS-CoV-2 testing.    Contraindication to deep vein thrombosis (DVT) prophylaxis- (present on admission)  Assessment & Plan  Prophylactic anticoagulation for thrombotic prevention initially contraindicated secondary to elevated bleeding risk.    Traumatic  pneumothorax- (present on admission)  Assessment & Plan  No indication for chest tube at admit  Minute left pneumothorax.  Supplemental oxygen to maintain SaO2 greater than 93%.  Aggressive pulmonary hygiene and serial chest radiography.    Trauma- (present on admission)  Assessment & Plan  Fall 12 ft from scaffolding onto concrete.  Trauma Green Activation.  David Nam MD. Trauma Surgery.    Scalp laceration, initial encounter- (present on admission)  Assessment & Plan  Posterior scalp laceration.  Closed with interrupted Ethilon sutures in the ED.  Suture removal in 7 days (4/27).      I independently reviewed pertinent clinical lab tests since admission and ordered additional follow up clinical lab tests.  I independently reviewed pertinent radiographic images and the radiologist's reports since admission and ordered additional follow up radiographic studies.  The details of the available patient records in [facility] (including laboratory tests, culture data, medications, imaging, and other pertinent diagnostic tests) and that information was utilized as warranted in today's medical decision making for this patient.  I evaluated the patient's condition at bedside.    The patient is critically ill with severe closed head injury.  This patient requires continued ICU management and hospital admission.  The patient has impairment of one or more vital organ systems and a high probability of imminent or life-threatening deterioration in condition. High complexity decision making and medically necessary care were provided by frequent assessment, manipulation, and support of central nervous system function to prevent further life-threatening deterioration of the patient's condition.     Critical care interventions include: Review of interval medical and surgical history.  Review of current medications and outpatient medication reconciliation.  Review of interval imaging studies and radiologist  interpretation.  Management of acute closed head injury.  Comanagement of neurosurgical injuries.    Aggregated critical care time spent evaluating, reassessing, reviewing documentation, providing care, and managing this patient exclusive of procedures: 75 minutes  ____________________________________   JOSH Campos     DD: 4/20/2021   DT: 4:11 PM

## 2021-04-20 NOTE — ED NOTES
Med rec updated and complete. Allergies reviewed. Met with pt at bedside. Pt denies antibiotic use in last 14 days.        Home pharmacy [facility]   [phone]

## 2021-04-20 NOTE — ASSESSMENT & PLAN NOTE
Posterior calvarium 9 mm depressed skull fracture which is mildly comminuted.  Approximation with the sagittal sinus, at risk for thrombosis.  No discernable underlying brain injury.  CT Venogram demonstrates posterior depressed skull fracture has focal absent superior sagittal dural venous sinus opacification at the level of the fracture.  No thrombus identified.  Non-operative management.   4/21 Given risk for thrombus, neurology consult.  - Per neurology note there is no  indication at this time for ASA or AC prophylaxis for dural venous thrombosis.  Ellis Correia MD. Neurosurgeon. [facility].   Everett Serna MD. Neurology.

## 2021-04-20 NOTE — ASSESSMENT & PLAN NOTE
Fall 12 ft from scaffolding onto concrete.  Trauma Green Activation.  David Nam MD. Trauma Surgery.

## 2021-04-20 NOTE — ASSESSMENT & PLAN NOTE
Prophylactic anticoagulation for thrombotic prevention initially contraindicated secondary to elevated bleeding risk.  RAP Score 7.  4/21 Trauma screening bilateral lower extremity venous duplex negative for above knee DVT.  4/21 Prophylactic dose enoxaparin initiated.

## 2021-04-20 NOTE — ASSESSMENT & PLAN NOTE
Posterior scalp laceration.  Closed with interrupted Ethilon sutures in the ED.  Suture removal in 7 days (4/27).

## 2021-04-20 NOTE — ED PROVIDER NOTES
"ED Provider Note    CHIEF COMPLAINT  Chief Complaint   Patient presents with    Trauma Green     Fell 12 feet off scaffolding onto concrete, hitting the back of his head with + LOC for a couple minutes, approximately 500mL of blood loss. No blood thinning medications.       [facility] 46 y.o. male who presents as a trauma green, fell off of a scaffold at a height of about 12 feet.  This is a trip and fall.  He struck his head and lost consciousness.  He complains of headache and left-sided chest pain.  No focal weakness numbness or tingling.  No midline neck or back pain.  He offers no other specific complaints.  Denies any significant medical problems.  Received fentanyl during the transport.  The first responders report that there was a significant amount of blood loss at the scene from a scalp wound, perhaps 500 mL of blood.    REVIEW OF SYSTEMS  Negative for neck pain, focal weakness, focal numbness, focal tingling, back pain, abdominal pain. All other systems are negative.     PAST MEDICAL HISTORY  No past medical history on file.    FAMILY HISTORY  No family history on file.    SOCIAL HISTORY  Social History     Tobacco Use    Smoking status: Not on file   Substance Use Topics    Alcohol use: Not on file    Drug use: Not on file       SURGICAL HISTORY  No past surgical history on file.    CURRENT MEDICATIONS  I personally reviewed the medication list in the charting documentation.     ALLERGIES  No Known Allergies    MEDICAL RECORD  I have reviewed patient's medical record and pertinent results are listed above.      PHYSICAL EXAM  VITAL SIGNS: /88   Pulse 74   Temp 36.2 °C (97.1 °F)   Resp 18   Ht 1.803 m (5' 11\")   Wt 88.5 kg (195 lb)   SpO2 93%   BMI 27.20 kg/m²    Primary survey:  Airway is intact  Symmetric breath sounds bilaterally  2+ radial and dorsalis pedis pulses bilaterally  GCS 15  Thoracic and lumbar spine is nontender, no step-offs or other deformities appreciated. "     Secondary survey:  Constitutional: An alert patient in no acute distress  HENT: Mucus membranes moist.  Oropharynx is clear.  Laceration of the posterior scalp with active arterial bleeding.  Eyes: Pupils are equal and reactive to light. EOMI. Normal conjunctiva.    Neck: C-collar in place, the C-spine is nontender, no step-offs or other deformities appreciated.  Cardiovascular: Regular heart rate and rhythm.   Thorax & Lungs: Chest is tender overlying the left lateral aspect with no crepitation or deformity appreciated. No ecchymosis, abrasions or other traumatic injury noted.  Lungs are clear to auscultation with good air movement bilaterally.  Abdomen: Soft, with mild left upper quadrant tenderness but no rebound nor guarding.  No mass or pulsatile mass appreciated. No ecchymosis, abrasions or other traumatic injury noted.  Skin: Warm, dry. No rash appreciated  Extremities/Musculoskeletal: No sign of trauma. No tenderness. Normal range of motion   Neurologic: Alert & oriented. CN II-XII grossly intact. Normal and symmetric motor and sensory functions upper and lower extremities bilaterally. No focal deficits observed.   Psychiatric: Normal affect appropriate for the clinical situation.    DIAGNOSTIC STUDIES / PROCEDURES    LABS  Results for orders placed or performed during the hospital encounter of 04/20/21   DIAGNOSTIC ALCOHOL   Result Value Ref Range    Diagnostic Alcohol <10.1 0.0 - 10.0 mg/dL   CBC WITHOUT DIFFERENTIAL   Result Value Ref Range    WBC 13.4 (H) 4.8 - 10.8 K/uL    RBC 4.40 (L) 4.70 - 6.10 M/uL    Hemoglobin 13.8 (L) 14.0 - 18.0 g/dL    Hematocrit 40.2 (L) 42.0 - 52.0 %    MCV 91.4 81.4 - 97.8 fL    MCH 31.4 27.0 - 33.0 pg    MCHC 34.3 33.7 - 35.3 g/dL    RDW 41.1 35.9 - 50.0 fL    Platelet Count 285 164 - 446 K/uL    MPV 11.2 9.0 - 12.9 fL   Comp Metabolic Panel   Result Value Ref Range    Sodium 141 135 - 145 mmol/L    Potassium 3.1 (L) 3.6 - 5.5 mmol/L    Chloride 102 96 - 112 mmol/L     Co2 26 20 - 33 mmol/L    Anion Gap 13.0 7.0 - 16.0    Glucose 169 (H) 65 - 99 mg/dL    Bun 14 8 - 22 mg/dL    Creatinine 0.85 0.50 - 1.40 mg/dL    Calcium 8.7 8.5 - 10.5 mg/dL    AST(SGOT) 42 12 - 45 U/L    ALT(SGPT) 44 2 - 50 U/L    Alkaline Phosphatase 80 30 - 99 U/L    Total Bilirubin 0.4 0.1 - 1.5 mg/dL    Albumin 4.5 3.2 - 4.9 g/dL    Total Protein 7.6 6.0 - 8.2 g/dL    Globulin 3.1 1.9 - 3.5 g/dL    A-G Ratio 1.5 g/dL   Prothrombin Time   Result Value Ref Range    PT 14.4 12.0 - 14.6 sec    INR 1.08 0.87 - 1.13   APTT   Result Value Ref Range    APTT 27.7 24.7 - 36.0 sec   COD - Adult (Type and Screen)   Result Value Ref Range    ABO Grouping Only O     Rh Grouping Only POS     Antibody Screen-Cod NEG    SARS-COV Antigen SADIA: Collect dry nasal swab AND NP swab in VTM   Result Value Ref Range    SARS-CoV-2 Source Nasal Swab     SARS-COV ANTIGEN SADIA NotDetected Not-Detected   SARS-CoV-2 PCR (24 hour In-House): Collect NP swab in VTM    Specimen: Respirate   Result Value Ref Range    SARS-CoV-2 Source NP Swab    ABO Rh Confirm   Result Value Ref Range    ABO Rh Confirm O POS    ESTIMATED GFR   Result Value Ref Range    GFR If African American >60 >60 mL/min/1.73 m 2    GFR If Non African American >60 >60 mL/min/1.73 m 2   POCT glucose device results   Result Value Ref Range    Glucose - Accu-Ck 168 (H) 65 - 99 mg/dL        RADIOLOGY  CT-CTA HEAD WITH & W/O-POST PROCESS   Final Result      Posterior depressed skull fracture has focal absent superior sagittal dural venous sinus opacification at the level of the fracture. Upstream and downstream to the fracture there is normal contrast enhancement so this could indicate the mass effect    results in essentially flattening of the sinus. No thrombus is identified      CT-CHEST,ABDOMEN,PELVIS WITH   Final Result      Tiny left pneumothorax.      Fractures of the posterior lateral left fifth, sixth, seventh, eighth, ninth and 10th ribs, the anterolateral fifth, sixth,  seventh, eighth, ninth and 10th ribs, and the anterior left third and fourth ribs.      No solid organ or vascular injury is identified.      Small anterior pneumomediastinum.      Findings discussed with Dr. Zapata.      CT-LSPINE W/O PLUS RECONS   Final Result      No fracture or subluxation is seen in the lumbar spine.                     CT-TSPINE W/O PLUS RECONS   Final Result      Fractures of the left sixth, seventh, eighth, ninth and 10th ribs.      Tiny left pneumothorax.         CT-CSPINE WITHOUT PLUS RECONS   Final Result      No CT evidence of acute cervical spine abnormality.      Trace cervical thoracic junction anterolisthesis is most likely degenerative      Minute left pneumothorax      CT-HEAD W/O   Final Result      No acute intracranial hemorrhage is identified despite posterior calvarium 9 mm depressed skull fracture which is mildly comminuted      Moderate sinus inflammatory disease      DX-CHEST-LIMITED (1 VIEW)   Final Result      Fractures of the left sixth, seventh and ninth ribs.      No pneumothorax is identified.         DX-CHEST-PORTABLE (1 VIEW)    (Results Pending)         Laceration Repair Procedure Note    Indication: Scalp laceration    Procedure: The patient was placed in the appropriate position and anesthesia around the lacerations was not performed given the time sensitive nature of the repair.  The laceration was initially closed with 4 staples, hemostasis was not achieved so the staples were subsequently removed and then using a 2-0 Ethilon stitch a figure-of-eight was placed followed by a simple interrupted inferiorly finally achieving hemostasis. There were no additional lacerations requiring repair.     Total repaired wound length: 2 cm.     Other Items: Suture count: 2    The patient tolerated the procedure well.    Complications: None          COURSE & MEDICAL DECISION MAKING  I have reviewed any medical record information, laboratory studies and radiographic results  as noted above.  Differential diagnoses includes: Intracranial injury, spinal injury, thoracic injury, intra-abdominal injury    Encounter Summary: This is a 46 y.o. male with head injury as well as left chest wall injury status post a fall from about 12 feet off a scaffold, there was a laceration noted of his posterior scalp with arterial bleeding during the primary survey and this was repaired in the trauma bay ultimately using a figure-of-eight stitch and a simple interrupted stitch to achieve hemostasis.  Vital signs are reassuring.  He does have some left-sided chest wall pain in addition to the left upper quadrant abdominal pain, CT scans will be obtained, he will be reevaluated ------- CT of the head reveals a depressed skull fracture without intracranial hemorrhage.  CT scan reveals numerous left-sided rib fractures as well as a tiny pneumothorax.  This case was discussed with the trauma surgeon on-call , I also discussed the case with Dr. Correia, on-call neurosurgeon.  Patient admitted to the intensive care unit in critical condition    CRITICAL CARE  The very real possibilty of a deterioration of this patient's condition required the highest level of my preparedness for sudden, emergent intervention.  I provided critical care services, which included medication orders, frequent reevaluations of the patient's condition and response to treatment, ordering and reviewing test results, and discussing the case with various consultants.  The critical care time associated with the care of the patient was 38 minutes. Review chart for interventions. This time is exclusive of any other billable procedures.       DISPOSITION: Admit in critical condition      FINAL IMPRESSION  1. Open fracture of posterior fossa of skull, initial encounter ([facility])    2. Closed fracture of multiple ribs of left side, initial encounter    3. Pneumothorax, left    4. Fall from scaffold, initial encounter           This dictation was  created using voice recognition software. The accuracy of the dictation is limited to the abilities of the software. I expect there may be some errors of grammar and possibly content. The nursing notes were reviewed and certain aspects of this information were incorporated into this note.    Electronically signed by: Bryant Zapata M.D., 4/20/2021 1:13 PM

## 2021-04-20 NOTE — LETTER
"  FORM C-4:  EMPLOYEE’S CLAIM FOR COMPENSATION/ REPORT OF INITIAL TREATMENT  EMPLOYEE’S CLAIM - PROVIDE ALL INFORMATION REQUESTED   First Name   Nadeem Last Name     Tj Araujo Birthdate   1975  Sex   male Claim Number   Home Address [address]   [facility]             [place] [address]                                   Age  46 y.o. Height  1.803 m (5' 11\") Weight  101 kg (221 lb 9 oz) N [phone]  [id number]   Mailing Address [address]  [facility] [facility]              [place] [address] Telephone  [phone] (home)  Primary Language Spoken   Insurer   Third Party   Marga Faustin Employee's Occupation (Job Title) When Injury or Occupational Disease Occurred     Employer's Name   [employer] [facility] Telephone   [phone]    Employer Address   [address] [facility] [address]] Zip   [address]   Date of Injury  4/20/2021       Hour of Injury  12:00 PM Date Employer Notified  4/20/2021 Last Day of Work after Injury or Occupational Disease  4/20/2021 Supervisor to Whom Injury Reported  Mac ZURITA   Address or Location of Accident (if applicable)   [Unknown job site]   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 Be specific and answer in detail. Use additional sheet if necessary)  can't remember   If you believe that you have an occupational disease, when did you first have knowledge of the disability and it relationship to your employment? na Witnesses to the Accident  guys on job site   Nature of Injury or Occupational Disease  Workers' Compensation Part(s) of Body Injured or Affected  Skull, N/A, N/A    I CERTIFY THAT THE ABOVE IS TRUE AND CORRECT TO THE BEST OF MY KNOWLEDGE AND THAT I HAVE PROVIDED THIS INFORMATION IN ORDER TO OBTAIN THE BENEFITS OF [place]).  I HEREBY " AUTHORIZE ANY PHYSICIAN, CHIROPRACTOR, SURGEON, PRACTITIONER, OR OTHER PERSON, ANY HOSPITAL, INCLUDING [facility] OR [facility], ANY MEDICAL SERVICE ORGANIZATION, ANY INSURANCE COMPANY, OR OTHER INSTITUTION OR ORGANIZATION TO RELEASE TO EACH OTHER, ANY MEDICAL OR OTHER INFORMATION, INCLUDING BENEFITS PAID OR PAYABLE, PERTINENT TO THIS INJURY OR DISEASE, EXCEPT INFORMATION RELATIVE TO DIAGNOSIS, TREATMENT AND/OR COUNSELING FOR AIDS, PSYCHOLOGICAL CONDITIONS, ALCOHOL OR CONTROLLED SUBSTANCES, FOR WHICH I MUST GIVE SPECIFIC AUTHORIZATION.  A PHOTOSTAT OF THIS AUTHORIZATION SHALL BE AS VALID AS THE ORIGINAL.  Date                                      Place                                                                             Employee’s Signature   THIS REPORT MUST BE COMPLETED AND MAILED WITHIN 3 WORKING DAYS OF TREATMENT   Place [facility]                       Name of Facility [facility]   Date  04/20/2021 Diagnosis  (S02.19XB) Open fracture of posterior fossa of skull, initial encounter ([facility])  (S22.42XA) Closed fracture of multiple ribs of left side, initial encounter  (J93.9) Pneumothorax, left  (W12.XXXA) Fall from scaffold, initial encounter Is there evidence the injured employee was under the influence of alcohol and/or another controlled substance at the time of accident?   Hour  20:16 Description of Injury or Disease  Open fracture of posterior fossa of skull, initial encounter ([facility])  Closed fracture of multiple ribs of left side, initial encounter  Pneumothorax, left  Fall from scaffold, initial encounter     Treatment     Have you advised the patient to remain off work five days or more?             X-Ray Findings    If Yes   From Date    To Date      From information given by the employee, together with medical evidence, can you directly connect this injury or occupational disease as job incurred?   If No, is employee capable of: Full Duty    Modified  "Duty      Is additional medical care by a physician indicated?   If Modified Duty, Specify any Limitations / Restrictions       Do you know of any previous injury or disease contributing to this condition or occupational disease?      Date 4/20/2021 Print Doctor’s Name Carly Edwin Bryant PERALES PERFECTO certify the employer’s copy of this form was mailed on:   Address [address] [place] [address]  [place] [id number] [phone] INSURER’S USE ONLY   Provider’s Tax ID Number   [id number] Telephone Dept: [phone]    Doctor’s Signature      Degree  MD      Form C-4 (rev.10/07)                                                                         BRIEF DESCRIPTION OF RIGHTS AND BENEFITS  (Pursuant to [place] 616[place])    Notice of Injury or Occupational Disease (Incident Report Form C-1): If an injury or occupational disease (OD) arises out of and in the course of employment, you must provide written notice to your employer as soon as practicable, but no later than 7 days after the accident or OD. Your employer shall maintain a sufficient supply of the required forms.    Claim for Compensation (Form C-4): If medical treatment is sought, the form C-4 is available at the place of initial treatment. A completed \"Claim for Compensation\" (Form C-4) must be filed within 90 days after an accident or OD. The treating physician or chiropractor must, within 3 working days after treatment, complete and mail to the employer, the employer's insurer and third-party , the Claim for Compensation.    Medical Treatment: If you require medical treatment for your on-the-job injury or OD, you may be required to select a physician or chiropractor from a list provided by your workers’ compensation insurer, if it has contracted with an Organization for Managed Care (MCO) or Preferred Provider Organization (PPO) or providers of health care. If your employer has not entered into a contract with an MCO or PPO, you may select a physician or chiropractor from " the Panel of Physicians and Chiropractors. Any medical costs related to your industrial injury or OD will be paid by your insurer.    Temporary Total Disability (TTD): If your doctor has certified that you are unable to work for a period of at least 5 consecutive days, or 5 cumulative days in a 20-day period, or places restrictions on you that your employer does not accommodate, you may be entitled to TTD compensation.    Temporary Partial Disability (TPD): If the wage you receive upon reemployment is less than the compensation for TTD to which you are entitled, the insurer may be required to pay you TPD compensation to make up the difference. TPD can only be paid for a maximum of 24 months.    Permanent Partial Disability (PPD): When your medical condition is stable and there is an indication of a PPD as a result of your injury or OD, within 30 days, your insurer must arrange for an evaluation by a rating physician or chiropractor to determine the degree of your PPD. The amount of your PPD award depends on the date of injury, the results of the PPD evaluation, your age and wage.    Permanent Total Disability (PTD): If you are medically certified by a treating physician or chiropractor as permanently and totally disabled and have been granted a PTD status by your insurer, you are entitled to receive monthly benefits not to exceed 66 2/3% of your average monthly wage. The amount of your PTD payments is subject to reduction if you previously received a lump-sum PPD award.    Vocational Rehabilitation Services: You may be eligible for vocational rehabilitation services if you are unable to return to the job due to a permanent physical impairment or permanent restrictions as a result of your injury or occupational disease.    Transportation and Per Augustina Reimbursement: You may be eligible for travel expenses and per augustina associated with medical treatment.    Reopening: You may be able to reopen your claim if your  condition worsens after claim closure.     Appeal Process: If you disagree with a written determination issued by the insurer or the insurer does not respond to your request, you may appeal to the Department of Administration, , by following the instructions contained in your determination letter. You must appeal the determination within 70 days from the date of the determination letter at [address], [address], [place], [place] [address], or [address] [facility], [place], [place] [address]. If you disagree with the  decision, you may appeal to the Department of Administration, . You must file your appeal within 30 days from the date of the  decision letter at [address], [address], [place], [place] [address], or [address] [facility] [address], [place], [place] [address]. If you disagree with a decision of an , you may file a petition for judicial review with the District Court. You must do so within 30 days of the Appeal Officer’s decision. You may be represented by an  at your own expense or you may contact the [facility] for possible representation.    [place]  for Injured Workers (NAIW): If you disagree with a  decision, you may request that NAIW represent you without charge at an  Hearing. For information regarding denial of benefits, you may contact the [facility] at: [address], [address], [place] [address], [phone], or [address] [facility], [place] [address], [phone]    To File a Complaint with the Division: If you wish to file a complaint with the  of the Division of Industrial Relations (DIR),  please contact the Workers’ Compensation Section, [address] [facility] [facility], [place], [place] [address], telephone [phone], or [address], [place], [place] [address], telephone [phone]  [phone].    For assistance with Workers’ Compensation Issues: You may contact the [facility] Office for Consumer Health Assistance, [facility] [address] [facility] [facility] [address], [place] [address], Toll Free 1-858.973.7344, Web site: http://[facility].[place].gov/Programs/VERNELL E-mail: vernell@[facility].[place].gov  D-2 (rev. 10/20)              __________________________________________________________________                                    _________________            Employee Name / Signature                                                                                                                            Date

## 2021-04-21 ENCOUNTER — APPOINTMENT (OUTPATIENT)
Dept: RADIOLOGY | Facility: MEDICAL CENTER | Age: 46
DRG: 964 | End: 2021-04-21
Attending: NURSE PRACTITIONER
Payer: COMMERCIAL

## 2021-04-21 ENCOUNTER — HOSPITAL ENCOUNTER (OUTPATIENT)
Dept: RADIOLOGY | Facility: MEDICAL CENTER | Age: 46
End: 2021-04-21
Attending: NURSE PRACTITIONER
Payer: COMMERCIAL

## 2021-04-21 PROBLEM — Z78.9 NO CONTRAINDICATION TO DEEP VEIN THROMBOSIS (DVT) PROPHYLAXIS: Status: ACTIVE | Noted: 2021-04-20

## 2021-04-21 PROBLEM — K21.9 GERD (GASTROESOPHAGEAL REFLUX DISEASE): Status: ACTIVE | Noted: 2021-04-21

## 2021-04-21 PROBLEM — I10 ESSENTIAL HYPERTENSION: Status: ACTIVE | Noted: 2021-04-21

## 2021-04-21 LAB
ANION GAP SERPL CALC-SCNC: 5 MMOL/L (ref 7–16)
BASOPHILS # BLD AUTO: 0.2 % (ref 0–1.8)
BASOPHILS # BLD: 0.02 K/UL (ref 0–0.12)
BUN SERPL-MCNC: 18 MG/DL (ref 8–22)
CALCIUM SERPL-MCNC: 8.3 MG/DL (ref 8.5–10.5)
CHLORIDE SERPL-SCNC: 98 MMOL/L (ref 96–112)
CO2 SERPL-SCNC: 27 MMOL/L (ref 20–33)
CREAT SERPL-MCNC: 0.79 MG/DL (ref 0.5–1.4)
EOSINOPHIL # BLD AUTO: 0.01 K/UL (ref 0–0.51)
EOSINOPHIL NFR BLD: 0.1 % (ref 0–6.9)
ERYTHROCYTE [DISTWIDTH] IN BLOOD BY AUTOMATED COUNT: 43.3 FL (ref 35.9–50)
GLUCOSE SERPL-MCNC: 131 MG/DL (ref 65–99)
HCT VFR BLD AUTO: 33.3 % (ref 42–52)
HGB BLD-MCNC: 11.2 G/DL (ref 14–18)
IMM GRANULOCYTES # BLD AUTO: 0.08 K/UL (ref 0–0.11)
IMM GRANULOCYTES NFR BLD AUTO: 0.6 % (ref 0–0.9)
LYMPHOCYTES # BLD AUTO: 1.94 K/UL (ref 1–4.8)
LYMPHOCYTES NFR BLD: 15 % (ref 22–41)
MCH RBC QN AUTO: 30.9 PG (ref 27–33)
MCHC RBC AUTO-ENTMCNC: 33.6 G/DL (ref 33.7–35.3)
MCV RBC AUTO: 92 FL (ref 81.4–97.8)
MONOCYTES # BLD AUTO: 1.39 K/UL (ref 0–0.85)
MONOCYTES NFR BLD AUTO: 10.8 % (ref 0–13.4)
NEUTROPHILS # BLD AUTO: 9.46 K/UL (ref 1.82–7.42)
NEUTROPHILS NFR BLD: 73.3 % (ref 44–72)
NRBC # BLD AUTO: 0 K/UL
NRBC BLD-RTO: 0 /100 WBC
PLATELET # BLD AUTO: 226 K/UL (ref 164–446)
PMV BLD AUTO: 11 FL (ref 9–12.9)
POTASSIUM SERPL-SCNC: 4 MMOL/L (ref 3.6–5.5)
RBC # BLD AUTO: 3.62 M/UL (ref 4.7–6.1)
SODIUM SERPL-SCNC: 130 MMOL/L (ref 135–145)
WBC # BLD AUTO: 12.9 K/UL (ref 4.8–10.8)

## 2021-04-21 PROCEDURE — 700102 HCHG RX REV CODE 250 W/ 637 OVERRIDE(OP): Performed by: NURSE PRACTITIONER

## 2021-04-21 PROCEDURE — 770006 HCHG ROOM/CARE - MED/SURG/GYN SEMI*

## 2021-04-21 PROCEDURE — A9270 NON-COVERED ITEM OR SERVICE: HCPCS | Performed by: NURSE PRACTITIONER

## 2021-04-21 PROCEDURE — 700101 HCHG RX REV CODE 250: Performed by: NURSE PRACTITIONER

## 2021-04-21 PROCEDURE — 97165 OT EVAL LOW COMPLEX 30 MIN: CPT

## 2021-04-21 PROCEDURE — 80048 BASIC METABOLIC PNL TOTAL CA: CPT

## 2021-04-21 PROCEDURE — 99232 SBSQ HOSP IP/OBS MODERATE 35: CPT | Performed by: SURGERY

## 2021-04-21 PROCEDURE — 85025 COMPLETE CBC W/AUTO DIFF WBC: CPT

## 2021-04-21 PROCEDURE — 99253 IP/OBS CNSLTJ NEW/EST LOW 45: CPT | Performed by: PSYCHIATRY & NEUROLOGY

## 2021-04-21 PROCEDURE — 93970 EXTREMITY STUDY: CPT

## 2021-04-21 PROCEDURE — 700101 HCHG RX REV CODE 250: Performed by: SURGERY

## 2021-04-21 PROCEDURE — 97161 PT EVAL LOW COMPLEX 20 MIN: CPT

## 2021-04-21 PROCEDURE — 71045 X-RAY EXAM CHEST 1 VIEW: CPT

## 2021-04-21 PROCEDURE — 700111 HCHG RX REV CODE 636 W/ 250 OVERRIDE (IP): Performed by: NURSE PRACTITIONER

## 2021-04-21 PROCEDURE — 94669 MECHANICAL CHEST WALL OSCILL: CPT

## 2021-04-21 RX ORDER — AMLODIPINE BESYLATE 10 MG/1
10 TABLET ORAL DAILY
Status: DISCONTINUED | OUTPATIENT
Start: 2021-04-21 | End: 2021-04-23 | Stop reason: HOSPADM

## 2021-04-21 RX ORDER — LIDOCAINE 50 MG/G
1-3 PATCH TOPICAL DAILY
Status: DISCONTINUED | OUTPATIENT
Start: 2021-04-21 | End: 2021-04-23 | Stop reason: HOSPADM

## 2021-04-21 RX ORDER — GABAPENTIN 300 MG/1
300 CAPSULE ORAL 3 TIMES DAILY
Status: DISCONTINUED | OUTPATIENT
Start: 2021-04-21 | End: 2021-04-23 | Stop reason: HOSPADM

## 2021-04-21 RX ORDER — TAMSULOSIN HYDROCHLORIDE 0.4 MG/1
0.4 CAPSULE ORAL
Status: DISCONTINUED | OUTPATIENT
Start: 2021-04-21 | End: 2021-04-23 | Stop reason: HOSPADM

## 2021-04-21 RX ADMIN — Medication 1 EACH: at 05:07

## 2021-04-21 RX ADMIN — GABAPENTIN 300 MG: 300 CAPSULE ORAL at 11:20

## 2021-04-21 RX ADMIN — GABAPENTIN 300 MG: 300 CAPSULE ORAL at 18:40

## 2021-04-21 RX ADMIN — METAXALONE 800 MG: 800 TABLET ORAL at 18:40

## 2021-04-21 RX ADMIN — DOCUSATE SODIUM 50 MG AND SENNOSIDES 8.6 MG 1 TABLET: 8.6; 5 TABLET, FILM COATED ORAL at 21:38

## 2021-04-21 RX ADMIN — METAXALONE 800 MG: 800 TABLET ORAL at 11:20

## 2021-04-21 RX ADMIN — OXYCODONE HYDROCHLORIDE 10 MG: 10 TABLET ORAL at 05:07

## 2021-04-21 RX ADMIN — DOCUSATE SODIUM 100 MG: 100 CAPSULE ORAL at 18:40

## 2021-04-21 RX ADMIN — FAMOTIDINE 20 MG: 20 TABLET ORAL at 05:07

## 2021-04-21 RX ADMIN — ACETAMINOPHEN 1000 MG: 500 TABLET ORAL at 11:20

## 2021-04-21 RX ADMIN — FAMOTIDINE 20 MG: 20 TABLET ORAL at 18:40

## 2021-04-21 RX ADMIN — ACETAMINOPHEN 1000 MG: 500 TABLET ORAL at 23:37

## 2021-04-21 RX ADMIN — GABAPENTIN 100 MG: 100 CAPSULE ORAL at 05:07

## 2021-04-21 RX ADMIN — ACETAMINOPHEN 1000 MG: 500 TABLET ORAL at 05:07

## 2021-04-21 RX ADMIN — LIDOCAINE 3 PATCH: 50 PATCH TOPICAL at 18:40

## 2021-04-21 RX ADMIN — ENOXAPARIN SODIUM 30 MG: 30 INJECTION SUBCUTANEOUS at 11:20

## 2021-04-21 RX ADMIN — TAMSULOSIN HYDROCHLORIDE 0.4 MG: 0.4 CAPSULE ORAL at 11:20

## 2021-04-21 RX ADMIN — ACETAMINOPHEN 1000 MG: 500 TABLET ORAL at 18:40

## 2021-04-21 RX ADMIN — METAXALONE 800 MG: 800 TABLET ORAL at 05:07

## 2021-04-21 RX ADMIN — AMLODIPINE BESYLATE 10 MG: 10 TABLET ORAL at 11:20

## 2021-04-21 RX ADMIN — DOCUSATE SODIUM 100 MG: 100 CAPSULE ORAL at 05:07

## 2021-04-21 ASSESSMENT — ENCOUNTER SYMPTOMS
BACK PAIN: 0
ROS GI COMMENTS: BM PRIOR TO ARRIVAL
FEVER: 0
SENSORY CHANGE: 0
BLURRED VISION: 0
NAUSEA: 0
DOUBLE VISION: 0
VOMITING: 0
TINGLING: 0
ABDOMINAL PAIN: 0
SPEECH CHANGE: 0
CHILLS: 0
HEADACHES: 0
FOCAL WEAKNESS: 0
SHORTNESS OF BREATH: 0
MYALGIAS: 1
DIZZINESS: 0
NECK PAIN: 0

## 2021-04-21 ASSESSMENT — PAIN DESCRIPTION - PAIN TYPE
TYPE: ACUTE PAIN

## 2021-04-21 ASSESSMENT — GAIT ASSESSMENTS
DISTANCE (FEET): 200
GAIT LEVEL OF ASSIST: SUPERVISED

## 2021-04-21 ASSESSMENT — LIFESTYLE VARIABLES
ALCOHOL_USE: YES
TOTAL SCORE: 0
ON A TYPICAL DAY WHEN YOU DRINK ALCOHOL HOW MANY DRINKS DO YOU HAVE: 3
CONSUMPTION TOTAL: NEGATIVE
TOTAL SCORE: 0
HAVE PEOPLE ANNOYED YOU BY CRITICIZING YOUR DRINKING: NO
HAVE YOU EVER FELT YOU SHOULD CUT DOWN ON YOUR DRINKING: NO
HOW MANY TIMES IN THE PAST YEAR HAVE YOU HAD 5 OR MORE DRINKS IN A DAY: 0
EVER FELT BAD OR GUILTY ABOUT YOUR DRINKING: NO
TOTAL SCORE: 0
AVERAGE NUMBER OF DAYS PER WEEK YOU HAVE A DRINK CONTAINING ALCOHOL: 1
SUBSTANCE_ABUSE: 0
EVER HAD A DRINK FIRST THING IN THE MORNING TO STEADY YOUR NERVES TO GET RID OF A HANGOVER: NO

## 2021-04-21 ASSESSMENT — COGNITIVE AND FUNCTIONAL STATUS - GENERAL
SUGGESTED CMS G CODE MODIFIER MOBILITY: CJ
SUGGESTED CMS G CODE MODIFIER DAILY ACTIVITY: CH
MOBILITY SCORE: 21
DAILY ACTIVITIY SCORE: 24
TURNING FROM BACK TO SIDE WHILE IN FLAT BAD: UNABLE

## 2021-04-21 ASSESSMENT — PATIENT HEALTH QUESTIONNAIRE - PHQ9
2. FEELING DOWN, DEPRESSED, IRRITABLE, OR HOPELESS: NOT AT ALL
SUM OF ALL RESPONSES TO PHQ9 QUESTIONS 1 AND 2: 0
1. LITTLE INTEREST OR PLEASURE IN DOING THINGS: NOT AT ALL

## 2021-04-21 ASSESSMENT — ACTIVITIES OF DAILY LIVING (ADL): TOILETING: INDEPENDENT

## 2021-04-21 ASSESSMENT — FIBROSIS 4 INDEX: FIB4 SCORE: 1.26

## 2021-04-21 NOTE — PROGRESS NOTES
Trauma / Surgical Daily Progress Note    Date of Service  4/21/2021    Chief Complaint  46 y.o. male admitted 4/20/2021 with a skull fracture and multiple left rib fractures    Interval Events  Up to chair, adequate pain control  Neurosurgery note reviewed  Case discussed with Dr. Serna, neurology who will consult but does not see a role for ASA or anticoagulation currently    - Continue collins due to retention, Flomax initiated  - Aggressive pulmonary hygiene and mobilization  - Enoxaparin for DVT prophylaxis  - PT/OT  - Transfer to [facility]    Review of Systems  Review of Systems   Constitutional: Negative for chills and fever.   HENT: Negative for hearing loss.    Eyes: Negative for blurred vision and double vision.   Respiratory: Negative for shortness of breath.    Cardiovascular: Negative for chest pain.   Gastrointestinal: Negative for abdominal pain, nausea and vomiting.        BM prior to arrival   Musculoskeletal: Positive for myalgias (left chest wall). Negative for back pain, joint pain and neck pain.   Skin: Negative for rash.   Neurological: Negative for dizziness, tingling, sensory change, speech change, focal weakness and headaches.   Psychiatric/Behavioral: Negative for substance abuse.        Vital Signs  Temp:  [36.2 °C (97.1 °F)-36.7 °C (98 °F)] 36.7 °C (98 °F)  Pulse:  [] 74  Resp:  [12-28] 13  BP: (122-157)/() 131/89  SpO2:  [93 %-99 %] 98 %    Physical Exam  Physical Exam  Vitals and nursing note reviewed.   Constitutional:       General: He is awake. He is not in acute distress.     Appearance: He is well-developed. He is not ill-appearing.      Interventions: Nasal cannula in place.      Comments: Up to chair   HENT:      Head: Normocephalic.      Comments: Posterior scalp laceration approximated with suture     Right Ear: External ear normal.      Left Ear: External ear normal.      Nose: Nose normal.      Mouth/Throat:      Mouth: Mucous membranes are dry.   Eyes:      Extraocular  Movements: Extraocular movements intact.      Pupils: Pupils are equal, round, and reactive to light.   Cardiovascular:      Rate and Rhythm: Normal rate and regular rhythm.      Pulses: Normal pulses.      Heart sounds: Normal heart sounds. No murmur.   Pulmonary:      Effort: Pulmonary effort is normal. No respiratory distress.      Breath sounds: Normal breath sounds. No stridor. No wheezing, rhonchi or rales.   Chest:      Chest wall: Tenderness (left chest) present.   Abdominal:      General: Bowel sounds are normal. There is no distension.      Palpations: Abdomen is soft.      Tenderness: There is no abdominal tenderness. There is no guarding.   Genitourinary:     Comments: Mckeon catheter in place with yellow/venkatesh urine  Musculoskeletal:         General: No swelling, tenderness, deformity or signs of injury.      Cervical back: Normal range of motion and neck supple. No rigidity or tenderness.      Comments: Moves all extremities   Skin:     General: Skin is warm and dry.   Neurological:      Mental Status: He is alert.      GCS: GCS eye subscore is 4. GCS verbal subscore is 5. GCS motor subscore is 6.   Psychiatric:         Mood and Affect: Mood normal.         Behavior: Behavior normal. Behavior is cooperative.         Laboratory  Recent Results (from the past 24 hour(s))   DIAGNOSTIC ALCOHOL    Collection Time: 04/20/21 12:49 PM   Result Value Ref Range    Diagnostic Alcohol <10.1 0.0 - 10.0 mg/dL   CBC WITHOUT DIFFERENTIAL    Collection Time: 04/20/21 12:49 PM   Result Value Ref Range    WBC 13.4 (H) 4.8 - 10.8 K/uL    RBC 4.40 (L) 4.70 - 6.10 M/uL    Hemoglobin 13.8 (L) 14.0 - 18.0 g/dL    Hematocrit 40.2 (L) 42.0 - 52.0 %    MCV 91.4 81.4 - 97.8 fL    MCH 31.4 27.0 - 33.0 pg    MCHC 34.3 33.7 - 35.3 g/dL    RDW 41.1 35.9 - 50.0 fL    Platelet Count 285 164 - 446 K/uL    MPV 11.2 9.0 - 12.9 fL   Comp Metabolic Panel    Collection Time: 04/20/21 12:49 PM   Result Value Ref Range    Sodium 141 135 - 145  mmol/L    Potassium 3.1 (L) 3.6 - 5.5 mmol/L    Chloride 102 96 - 112 mmol/L    Co2 26 20 - 33 mmol/L    Anion Gap 13.0 7.0 - 16.0    Glucose 169 (H) 65 - 99 mg/dL    Bun 14 8 - 22 mg/dL    Creatinine 0.85 0.50 - 1.40 mg/dL    Calcium 8.7 8.5 - 10.5 mg/dL    AST(SGOT) 42 12 - 45 U/L    ALT(SGPT) 44 2 - 50 U/L    Alkaline Phosphatase 80 30 - 99 U/L    Total Bilirubin 0.4 0.1 - 1.5 mg/dL    Albumin 4.5 3.2 - 4.9 g/dL    Total Protein 7.6 6.0 - 8.2 g/dL    Globulin 3.1 1.9 - 3.5 g/dL    A-G Ratio 1.5 g/dL   Prothrombin Time    Collection Time: 04/20/21 12:49 PM   Result Value Ref Range    PT 14.4 12.0 - 14.6 sec    INR 1.08 0.87 - 1.13   APTT    Collection Time: 04/20/21 12:49 PM   Result Value Ref Range    APTT 27.7 24.7 - 36.0 sec   COD - Adult (Type and Screen)    Collection Time: 04/20/21 12:49 PM   Result Value Ref Range    ABO Grouping Only O     Rh Grouping Only POS     Antibody Screen-Cod NEG    ESTIMATED GFR    Collection Time: 04/20/21 12:49 PM   Result Value Ref Range    GFR If African American >60 >60 mL/min/1.73 m 2    GFR If Non African American >60 >60 mL/min/1.73 m 2   SARS-COV Antigen SADIA: Collect dry nasal swab AND NP swab in VTM    Collection Time: 04/20/21  1:45 PM   Result Value Ref Range    SARS-CoV-2 Source Nasal Swab     SARS-COV ANTIGEN SADIA NotDetected Not-Detected   SARS-CoV-2 PCR (24 hour In-House): Collect NP swab in VTM    Collection Time: 04/20/21  1:45 PM    Specimen: Respirate   Result Value Ref Range    SARS-CoV-2 Source NP Swab     SARS-CoV-2 by PCR NotDetected    ABO Rh Confirm    Collection Time: 04/20/21  2:03 PM   Result Value Ref Range    ABO Rh Confirm O POS    POCT glucose device results    Collection Time: 04/20/21  6:08 PM   Result Value Ref Range    Glucose - Accu-Ck 168 (H) 65 - 99 mg/dL   POCT glucose device results    Collection Time: 04/20/21 11:36 PM   Result Value Ref Range    Glucose - Accu-Ck 150 (H) 65 - 99 mg/dL   CBC with Differential: Tomorrow AM    Collection  Time: 04/21/21  4:09 AM   Result Value Ref Range    WBC 12.9 (H) 4.8 - 10.8 K/uL    RBC 3.62 (L) 4.70 - 6.10 M/uL    Hemoglobin 11.2 (L) 14.0 - 18.0 g/dL    Hematocrit 33.3 (L) 42.0 - 52.0 %    MCV 92.0 81.4 - 97.8 fL    MCH 30.9 27.0 - 33.0 pg    MCHC 33.6 (L) 33.7 - 35.3 g/dL    RDW 43.3 35.9 - 50.0 fL    Platelet Count 226 164 - 446 K/uL    MPV 11.0 9.0 - 12.9 fL    Neutrophils-Polys 73.30 (H) 44.00 - 72.00 %    Lymphocytes 15.00 (L) 22.00 - 41.00 %    Monocytes 10.80 0.00 - 13.40 %    Eosinophils 0.10 0.00 - 6.90 %    Basophils 0.20 0.00 - 1.80 %    Immature Granulocytes 0.60 0.00 - 0.90 %    Nucleated RBC 0.00 /100 WBC    Neutrophils (Absolute) 9.46 (H) 1.82 - 7.42 K/uL    Lymphs (Absolute) 1.94 1.00 - 4.80 K/uL    Monos (Absolute) 1.39 (H) 0.00 - 0.85 K/uL    Eos (Absolute) 0.01 0.00 - 0.51 K/uL    Baso (Absolute) 0.02 0.00 - 0.12 K/uL    Immature Granulocytes (abs) 0.08 0.00 - 0.11 K/uL    NRBC (Absolute) 0.00 K/uL   Basic Metabolic Panel    Collection Time: 04/21/21  4:09 AM   Result Value Ref Range    Sodium 130 (L) 135 - 145 mmol/L    Potassium 4.0 3.6 - 5.5 mmol/L    Chloride 98 96 - 112 mmol/L    Co2 27 20 - 33 mmol/L    Glucose 131 (H) 65 - 99 mg/dL    Bun 18 8 - 22 mg/dL    Creatinine 0.79 0.50 - 1.40 mg/dL    Calcium 8.3 (L) 8.5 - 10.5 mg/dL    Anion Gap 5.0 (L) 7.0 - 16.0   ESTIMATED GFR    Collection Time: 04/21/21  4:09 AM   Result Value Ref Range    GFR If African American >60 >60 mL/min/1.73 m 2    GFR If Non African American >60 >60 mL/min/1.73 m 2       Fluids    Intake/Output Summary (Last 24 hours) at 4/21/2021 1011  Last data filed at 4/21/2021 0800  Gross per 24 hour   Intake 2330 ml   Output 2025 ml   Net 305 ml       Core Measures & Quality Metrics  Labs reviewed, Medications reviewed and Radiology images reviewed  Mckeon catheter: Urinary Tract Retention or Urinary Tract Obstruction      DVT Prophylaxis: Enoxaparin (Lovenox)  DVT prophylaxis - mechanical: SCDs  Ulcer prophylaxis:  Yes        RAP Score Total: 7    ETOH Screening     Assessment complete date: 4/21/2021 (Admission BA negative, CAGE not completed)        Assessment/Plan  Pneumomediastinum (HCC)- (present on admission)  Assessment & Plan  Small anterior pneumomediastinum.  Cardiac monitoring negative for ectopy.  Aggressive pulmonary hygiene and multimodal pain management and serial chest radiography.    Skull fracture (HCC)- (present on admission)  Assessment & Plan  Posterior calvarium 9 mm depressed skull fracture which is mildly comminuted.  Approximation with the sagittal sinus, at risk for thrombosis.  No discernable underlying brain injury.  CT Venogram demonstrates posterior depressed skull fracture has focal absent superior sagittal dural venous sinus opacification at the level of the fracture.  No thrombus identified.  Non-operative management.   4/21 Given risk for thrombus, neurology consult.  Ellis Correia MD. Neurosurgeon. [facility].   Everett Serna MD. Neurology.    Closed fracture of multiple ribs of left side- (present on admission)  Assessment & Plan  Fractures of the posterior lateral left fifth, sixth, seventh, eighth, ninth and 10th ribs, the anterolateral fifth, sixth, seventh, eighth, ninth and 10th ribs, and the anterior left third and fourth ribs.  Aggressive pulmonary hygiene and multimodal pain management.    Urinary retention  Assessment & Plan  4/20 Bladder scan with 886ml. Mckeon placed.  4/21 Flomax initiated.    GERD (gastroesophageal reflux disease)- (present on admission)  Assessment & Plan  Chronic condition treated with omeprazole.  Pepcid while in the acute care setting.    Essential hypertension- (present on admission)  Assessment & Plan  Chronic condition treated with amlodipine.  Resumed maintenance medication on admission.    Screening examination for infectious disease- (present on admission)  Assessment & Plan  Admission SARS-CoV-2 testing negative. Repeat SARS-CoV-2  testing not indicated. Isolation precautions de-escalated.    No contraindication to deep vein thrombosis (DVT) prophylaxis- (present on admission)  Assessment & Plan  Prophylactic anticoagulation for thrombotic prevention initially contraindicated secondary to elevated bleeding risk.  RAP Score 7.  4/21 Trauma surveillance venous duplex scanning ordered.  4/21 Prophylactic dose enoxaparin initiated.     Trauma- (present on admission)  Assessment & Plan  Fall 12 ft from scaffolding onto concrete.  Trauma Green Activation.  David Nam MD. Trauma Surgery.    Scalp laceration, initial encounter- (present on admission)  Assessment & Plan  Posterior scalp laceration.  Closed with interrupted Ethilon sutures in the ED.  Suture removal in 7 days (4/27).    Traumatic pneumothorax- (present on admission)  Assessment & Plan  No indication for chest tube at admit.  Minute left pneumothorax.  Supplemental oxygen to maintain SaO2 greater than 93%.  Aggressive pulmonary hygiene and serial chest radiography.  4/21 Not visualized on CXR.      Discussed patient condition with Family, , , Patient and trauma surgery. Dr. Centeno

## 2021-04-21 NOTE — CARE PLAN
Problem: Safety  Goal: Will remain free from injury  Outcome: PROGRESSING AS EXPECTED  Goal: Will remain free from falls  Outcome: PROGRESSING AS EXPECTED     Problem: Infection  Goal: Will remain free from infection  Outcome: PROGRESSING AS EXPECTED     Problem: Venous Thromboembolism (VTW)/Deep Vein Thrombosis (DVT) Prevention:  Goal: Patient will participate in Venous Thrombosis (VTE)/Deep Vein Thrombosis (DVT)Prevention Measures  Outcome: PROGRESSING AS EXPECTED     Problem: Pain Management  Goal: Pain level will decrease to patient's comfort goal  Outcome: PROGRESSING AS EXPECTED     Problem: Respiratory:  Goal: Respiratory status will improve  Outcome: PROGRESSING AS EXPECTED

## 2021-04-21 NOTE — CONSULTS
DATE OF SERVICE:  04/20/2021     NEUROSURGERY CONSULTATION     REASON FOR CONSULTATION:  Open depressed skull fracture.     HISTORY OF PRESENT ILLNESS:  This is a 46-year-old gentleman who was on a   scaffolding earlier, fell 12 feet onto the ground, unclear where he hit his   head on.  However, he hit the back of his head, was split open and he had bone   showing. Reportedly, he was also very altered and may have loss of   consciousness for several minutes.  Aside from that, he also presents with   some left rib fractures, and at this time, he is in the emergency room.  He   has had his wound irrigated, debrided and sutured closed.  His CT scan   demonstrated a depressed skull fracture of minimal size, but directly over the   superior sagittal sinus, it was also comminuted. There did not appear to be   any intracranial bleeding.  There also did not seem to be any hyperdensity   within the superior sagittal sinus consistent with thrombosis.  The patient is   in the emergency room.  There is some slight language barrier, but he is   neurologically intact.  He is otherwise doing very well.  Given the stated   injury, he does not have a CTV yet.     REVIEW OF SYSTEMS:  A 12-point review of systems as per HPI.  He does not   demonstrate any seizure disorder.  He is not having any other significant   findings at this time.     PAST MEDICAL HISTORY:  Noncontributory.     PAST SURGICAL HISTORY:  Noncontributory.     MEDICATIONS:  Please see EMR.     PHYSICAL EXAMINATION:    GENERAL:  He is alert and oriented x3.  He is able to answer questions   appropriately.  He is able to follow commands.  There is no focal deficit.  NEUROLOGIC:  Cranial nerves grossly intact.  Incision is clean, dry and   intact.  At this time, he does have blood in his hairline.     IMAGING:  The patient had a CT head without contrast, which shows a slight   depressed skull fracture, which is directly over the superior sagittal sinus   without  hyperdensity within this.  There is no venous congestion surrounding   it as well.     ASSESSMENT AND PLAN:  At this time, this patient is an intact post loss of   consciousness.  The patient with a depressed skull fracture directly over the   superior sagittal sinus.  He currently is plan for a CTV for evaluation of   sinus integrity.  Once he has that, we would watch the patient overnight in   the ICU.  Repeat the CT in the morning, potentially another venogram prior to   discharging the patient, but the patient is stable with no signs of venous   thrombosis. We would continue to update the chart and potentially even consult   Stroke Neurology for concern for venous injury if there is any sign of it on   any of the imaging.  Aside from that, the patient does not demonstrate any   significant blood in the head.  He should have Keppra 500 b.i.d., systolic   blood pressure is controlled, less than 160, every one-hour neuro checks.  ICU   admission, n.p.o. for now.     Total of 50 minutes was spent in direct patient care, coordination and   consultation.        ______________________________  MD KARTHIK Hastings/BARRY/SHAWN    DD:  04/20/2021 15:54  DT:  04/20/2021 19:24    Job#:  [id number]

## 2021-04-21 NOTE — PROGRESS NOTES
Neurosurgery Progress Note    Subjective:  No events    Exam:  Neuro intact     BP  Min: 122/78  Max: 157/103  Pulse  Av.4  Min: 68  Max: 102  Resp  Av.7  Min: 12  Max: 28  Temp  Av.4 °C (97.6 °F)  Min: 36.2 °C (97.1 °F)  Max: 36.7 °C (98 °F)  Monitored Temp 2  Av.1 °C (98.7 °F)  Min: 36.7 °C (98.1 °F)  Max: 37.4 °C (99.3 °F)  SpO2  Av.2 %  Min: 93 %  Max: 99 %    No data recorded    Recent Labs     21  1249 21  0409   WBC 13.4* 12.9*   RBC 4.40* 3.62*   HEMOGLOBIN 13.8* 11.2*   HEMATOCRIT 40.2* 33.3*   MCV 91.4 92.0   MCH 31.4 30.9   MCHC 34.3 33.6*   RDW 41.1 43.3   PLATELETCT 285 226   MPV 11.2 11.0     Recent Labs     21  1249 21  0409   SODIUM 141 130*   POTASSIUM 3.1* 4.0   CHLORIDE 102 98   CO2 26 27   GLUCOSE 169* 131*   BUN 14 18   CREATININE 0.85 0.79   CALCIUM 8.7 8.3*     Recent Labs     21  1249   APTT 27.7   INR 1.08           Intake/Output       21 0700 - 21 0659 21 07 - 21 0659       Total 190059 Total       Intake    P.O.  180  -- 180  0  -- 0    P.O. 180 -- 180 0 -- 0    I.V.  650  1200 1850  200  -- 200    Pre-Hospital Volume 500 -- 500 -- -- --    Trauma Resuscitation Volume 0 -- 0 -- -- --    Volume (mL) (NS infusion) 150 1200 1350 200 -- 200    Blood  0  -- 0  --  -- --    PRBC Total Volume (Non-Barcoded) 0 -- 0 -- -- --    FFP Total Volume (Non-Barcoded) 0 -- 0 -- -- --    Platelets Total Volume (Non-Barcoded) 0 -- 0 -- -- --    Cryoprecipitate (Pooled) Total Volume (Non-Barcoded) 0 -- 0 -- -- --    Other  --  -- --  0  -- 0    Medications (PO/Enteral Liquids) -- -- -- 0 -- 0    IV Piggyback  --  -- --  100  -- 100    Volume (mL) (ceFAZolin in dextrose (ANCEF) IVPB premix 2 g) -- -- -- 100 -- 100    Total Intake 830 1200  300 -- 300       Output    Urine  900  625 1525  --  -- --    Output (mL) (Urethral Catheter Temperature probe)  -- -- --    Other  0  -- 0  --  --  --    Pre-Hospital Output 0 -- 0 -- -- --    Trauma Resuscitation Output 0 -- 0 -- -- --    Stool  --  -- --  --  -- --    Number of Times Stooled -- -- -- 0 x -- 0 x    Blood  500  -- 500  --  -- --    Est. Blood Loss 500 -- 500 -- -- --    Total Output 0898 409 7720 -- -- --       Net I/O     -570 575 5 300 -- 300            Intake/Output Summary (Last 24 hours) at 4/21/2021 0846  Last data filed at 4/21/2021 0800  Gross per 24 hour   Intake 2330 ml   Output 2025 ml   Net 305 ml       $ Bladder Scan Results (mL): 886    • Respiratory Therapy Consult   Continuous RT   • Pharmacy Consult Request  1 Each PHARMACY TO DOSE   • docusate sodium  100 mg BID   • senna-docusate  1 tablet Nightly   • senna-docusate  1 tablet Q24HRS PRN   • polyethylene glycol/lytes  1 Packet BID   • magnesium hydroxide  30 mL DAILY   • bisacodyl  10 mg Q24HRS PRN   • fleet  1 Each Once PRN   • NS   Continuous   • acetaminophen  1,000 mg Q6HRS    Followed by   • [START ON 4/25/2021] acetaminophen  1,000 mg Q6HRS PRN   • oxyCODONE immediate-release  5 mg Q3HRS PRN    Or   • oxyCODONE immediate-release  10 mg Q3HRS PRN    Or   • morphine injection  4 mg Q3HRS PRN   • famotidine  20 mg BID    Or   • famotidine  20 mg BID   • ondansetron  4 mg Q4HRS PRN   • bacitracin-polymyxin b   TID   • insulin regular  1-6 Units Q6HRS    And   • glucose  16 g Q15 MIN PRN    And   • dextrose 50%  50 mL Q15 MIN PRN   • lidocaine  1-3 Patch DAILY   • gabapentin  100 mg TID   • metaxalone  800 mg TID       Assessment and Plan:  Hospital day #2  POD #na  Prophylactic anticoagulation: no         Start date/time: tbd    CTV shows SSS open given depression of SSS from FX this is concerning for damage and high risk for thrombosis would consult stroke neurology to see if ASA is indicated. If so would consider starting this in the next couple days  Ok for Q4 hour neuro checks  Would be OK with TTF later today but would watch for change in exam additional 24-48 hours on  floor    30 mins in pt care   Correia

## 2021-04-21 NOTE — CONSULTS
Hospital Neurology Consult:    Referring Physician: David Pyle M.D.    Reason for consultation: AC or Aspirin prophylaxis    HPI: Nadeem Araujo is a 46 y.o. male with no PMH, who had presented on 4/20/2021 as a trauma, s/p 12-foot fall from scaffolding. Patient hit the cement, and hit the back of his head and left chest wall. Patient currently with posterior calvarium 9mm depressed skull fracture which is mildly comminuted. CTA of the head was obtained showing a posterior depressed skull fracture that  Has focal absent superior sagittal dural venous opacification at the level of the fracture. NO thrombus was identified. Neurology team consulted to assess the need for AC vs ASA in the setting of a depressed skull fracture with close approximation to the sagittal sinus. Patient was seen this afternoon. Currently denies any headaches, blurry vision, or seizures, States that the pain is well controlled. No weakness or loss of sensation.     ROS:   GEN: No fevers/chills. NO weight loss or weight gain  CARD: NO chest pain or palpitations  RESP: No shortness of breath, no wheezing, no coughs  GI: No constipation/diarrhea, no nausea/vomiting, no hematochezia or melena   : NO dysuria or hematuria  Neuro: No headache, no blurry vision, no weakness/loss of sensation  PSYCH: NO SI or HI     As above. All other systems reviewed and are negative.    Past Medical History:    has a past medical history of Depression.    FHx:  family history is not on file.    SHx:   reports that he has never smoked. He has never used smokeless tobacco. He reports current alcohol use. He reports that he does not use drugs.    Allergies:  No Known Allergies    Medications:    Current Facility-Administered Medications:   •  amLODIPine (NORVASC) tablet 10 mg, 10 mg, Oral, DAILY, Lea Morgan, A.P.R.N., 10 mg at 04/21/21 1120  •  gabapentin (NEURONTIN) capsule 300 mg, 300 mg, Oral, TID, Lea Wolfin, A.P.R.N., 300 mg at 04/21/21 1120  •   tamsulosin (FLOMAX) capsule 0.4 mg, 0.4 mg, Oral, AFTER BREAKFAST, Lea Morgan, A.P.R.N., 0.4 mg at 04/21/21 1120  •  enoxaparin (LOVENOX) inj 30 mg, 30 mg, Subcutaneous, Q12HRS, Lea Morgan, A.P.R.N., 30 mg at 04/21/21 1120  •  Respiratory Therapy Consult, , Nebulization, Continuous RT, Lea Morgan A.P.R.N.  •  Pharmacy Consult Request ...Pain Management Review 1 Each, 1 Each, Other, PHARMACY TO DOSE, Lea Morgan, A.P.R.N.  •  docusate sodium (COLACE) capsule 100 mg, 100 mg, Oral, BID, Lea Morgan, A.P.R.N., 100 mg at 04/21/21 0507  •  senna-docusate (PERICOLACE or SENOKOT S) 8.6-50 MG per tablet 1 tablet, 1 tablet, Oral, Nightly, Lea Morgan, A.P.R.N., 1 tablet at 04/20/21 2004  •  senna-docusate (PERICOLACE or SENOKOT S) 8.6-50 MG per tablet 1 tablet, 1 tablet, Oral, Q24HRS PRN, Lea Morgan, A.P.R.N.  •  polyethylene glycol/lytes (MIRALAX) PACKET 1 Packet, 1 Packet, Oral, BID, Lea Morgan, A.P.R.N.  •  magnesium hydroxide (MILK OF MAGNESIA) suspension 30 mL, 30 mL, Oral, DAILY, Lea Morgan, A.P.R.N.  •  bisacodyl (DULCOLAX) suppository 10 mg, 10 mg, Rectal, Q24HRS PRN, Lea Morgan, A.P.R.N.  •  fleet enema 133 mL, 1 Each, Rectal, Once PRN, Lea Morgan, A.P.R.N.  •  acetaminophen (TYLENOL) tablet 1,000 mg, 1,000 mg, Oral, Q6HRS, 1,000 mg at 04/21/21 1120 **FOLLOWED BY** [START ON 4/25/2021] acetaminophen (TYLENOL) tablet 1,000 mg, 1,000 mg, Oral, Q6HRS PRN, Lea Morgan, A.P.R.N.  •  oxyCODONE immediate-release (ROXICODONE) tablet 5 mg, 5 mg, Oral, Q3HRS PRN **OR** oxyCODONE immediate release (ROXICODONE) tablet 10 mg, 10 mg, Oral, Q3HRS PRN, 10 mg at 04/21/21 0507 **OR** morphine (pf) 4 mg/mL injection 4 mg, 4 mg, Intravenous, Q3HRS PRN, Lea Morgan, A.P.R.N., 4 mg at 04/20/21 1815  •  famotidine (PEPCID) tablet 20 mg, 20 mg, Oral, BID, 20 mg at 04/21/21 0507 **OR** [DISCONTINUED] famotidine (PEPCID) injection 20 mg, 20 mg, Intravenous, BID, Lea Morgan, A.P.R.N.  •  ondansetron (ZOFRAN) syringe/vial injection 4  "mg, 4 mg, Intravenous, Q4HRS PRN, Lea Morgan, A.P.R.N.  •  bacitracin-polymyxin b (POLYSPORIN) 500-46542 UNIT/GM ointment, , Topical, TID, Lea Morgan, A.P.R.N., Stopped at 04/21/21 1200  •  lidocaine (LIDODERM) 5 % 1-3 Patch, 1-3 Patch, Transdermal, DAILY, Lea Morgan, A.P.R.N., 2 Patch at 04/20/21 1639  •  metaxalone (Skelaxin) tablet 800 mg, 800 mg, Oral, TID, Lea Morgan, A.P.R.N., 800 mg at 04/21/21 1120    Vitals:   Vitals:    04/21/21 1000 04/21/21 1024 04/21/21 1100 04/21/21 1200   BP: 153/97  142/88 129/79   Pulse: 78  89 68   Resp: (!) 26  (!) 33 (!) 10   Temp:       TempSrc: Bladder   Bladder   SpO2: 98% 88% 95% 100%   Weight:       Height: 1.803 m (5' 10.98\")          Labs:  Lab Results   Component Value Date/Time    PROTHROMBTM 14.4 04/20/2021 12:49 PM    INR 1.08 04/20/2021 12:49 PM      Lab Results   Component Value Date/Time    WBC 12.9 (H) 04/21/2021 04:09 AM    RBC 3.62 (L) 04/21/2021 04:09 AM    HEMOGLOBIN 11.2 (L) 04/21/2021 04:09 AM    HEMATOCRIT 33.3 (L) 04/21/2021 04:09 AM    MCV 92.0 04/21/2021 04:09 AM    MCH 30.9 04/21/2021 04:09 AM    MCHC 33.6 (L) 04/21/2021 04:09 AM    MPV 11.0 04/21/2021 04:09 AM    NEUTSPOLYS 73.30 (H) 04/21/2021 04:09 AM    LYMPHOCYTES 15.00 (L) 04/21/2021 04:09 AM    MONOCYTES 10.80 04/21/2021 04:09 AM    EOSINOPHILS 0.10 04/21/2021 04:09 AM    BASOPHILS 0.20 04/21/2021 04:09 AM      Lab Results   Component Value Date/Time    SODIUM 130 (L) 04/21/2021 04:09 AM    POTASSIUM 4.0 04/21/2021 04:09 AM    CHLORIDE 98 04/21/2021 04:09 AM    CO2 27 04/21/2021 04:09 AM    GLUCOSE 131 (H) 04/21/2021 04:09 AM    BUN 18 04/21/2021 04:09 AM    CREATININE 0.79 04/21/2021 04:09 AM      No results found for: CHOLSTRLTOT, LDL, HDL, TRIGLYCERIDE    Lab Results   Component Value Date/Time    ALKPHOSPHAT 80 04/20/2021 12:49 PM    ASTSGOT 42 04/20/2021 12:49 PM    ALTSGPT 44 04/20/2021 12:49 PM    TBILIRUBIN 0.4 04/20/2021 12:49 PM      No results found for: TSH  No results found for: " FREET4  No results found for: FREET3    Imaging/Testing:  Reviewed     Physical Exam:     General: NAD  Cardio: Normal S1/S2. No peripheral edema.   Pulm: CTAX2. No respiratory distress.   Skin: Warm, dry, no rashes or lesions   Psychiatric: Appropriate affect. No active psychosis.  HEENT: traumatic head with posterior scalp laceration. Sutures c/d/i. normal sclera and conjunctiva, moist oral mucosa. No lid lag.  Abdomen: Soft, non tender. No masses or hepatosplenomegaly.    Neurologic:  Mental Status:  AAOx4. Able to follow commands/cross midline. Speech fluent/nondysarthric. Language functions intact. No neglect/apraxia.  Cranial Nerves:  PERRL. EOMi. Face symmetric, palate/tongue midline. Visual fields full to confrontation. Facial sensation intact.   Motor:  Normal muscle tone and bulk. Strength is 5/5 throughout. No abnormal movements.  Reflexes:  2/4 throughout. Flexor plantar responses bilaterally. Absent Davis's reflex.  Coordination:  Finger-nose/heel-shin without ataxia or dysmetria.   Sensation:  Normal to pinprick, soft touch, proprioception.       Assessment/Plan:    Nadeem Araujo is a 46 y.o. male with no PMH, who had presented on 4/20/2021 as a trauma, s/p 12-foot fall from scaffolding. Patient fell onto the cement, and hit the back of his head and left chest wall. Patient currently with posterior calvarium 9mm depressed skull fracture which is mildly comminuted. Neurology team consulted to assess for risk of thrombosis given the approximation of the depressed skull fracture to the sagittal sinus, and the need for anticoagulation vs ASA prophylaxis.     Plan:  Currently no thrombosis seen on imaging  No indication at this time for ASA or AC prophylaxis for dural venous thrombosis  Continue Lovenox for DVT ppx.   No further recommendations at this time.     Plan discussed with consulting physician and patient's nurse.       Uriah Connelly M.D.,  Internal Medicine PGY1    Further  recommendations to follow from Dr. Pili Serna    [employer]

## 2021-04-21 NOTE — PROGRESS NOTES
"Trauma Progress Note     Briefly, this is a 46 y.o. male with rib fractures, skull fracture following a fall from height    Blood Pressure 131/86   Pulse 93   Temperature 36.7 °C (98 °F) (Temporal)   Respiration 14   Height 1.803 m (5' 11\")   Weight 101 kg (221 lb 9 oz)   Oxygen Saturation 97%   Body Mass Index 30.90 kg/m²       Intake/Output Summary (Last 24 hours) at 4/20/2021 2213  Last data filed at 4/20/2021 2000  Gross per 24 hour   Intake 1030 ml   Output 1460 ml   Net -430 ml       Lab Results   Component Value Date/Time    WBC 13.4 (H) 04/20/2021 12:49 PM    RBC 4.40 (L) 04/20/2021 12:49 PM    HEMOGLOBIN 13.8 (L) 04/20/2021 12:49 PM    HEMATOCRIT 40.2 (L) 04/20/2021 12:49 PM    MCV 91.4 04/20/2021 12:49 PM    MCH 31.4 04/20/2021 12:49 PM    MCHC 34.3 04/20/2021 12:49 PM    MPV 11.2 04/20/2021 12:49 PM        Lab Results   Component Value Date/Time    SODIUM 141 04/20/2021 12:49 PM    POTASSIUM 3.1 (L) 04/20/2021 12:49 PM    CHLORIDE 102 04/20/2021 12:49 PM    CO2 26 04/20/2021 12:49 PM    GLUCOSE 169 (H) 04/20/2021 12:49 PM    BUN 14 04/20/2021 12:49 PM    CREATININE 0.85 04/20/2021 12:49 PM        Lab Results   Component Value Date/Time    PROTHROMBTM 14.4 04/20/2021 12:49 PM    INR 1.08 04/20/2021 12:49 PM              Assessment:  Awake, alert male.  GCS 15.    Reports minimal pain in chest and head.    Additional Plans:  Await definitve neurosurgical recommendations  Continue aggressive pulmonary hygiene       "

## 2021-04-21 NOTE — CARE PLAN
Respiratory Update    Treatment modality: IS = 1000    Pt tolerating current treatments well with no adverse reactions.

## 2021-04-21 NOTE — PROGRESS NOTES
"TRAUMA TERTIARY SURVEY     Mental status adequate for full examination?: Yes    Spine cleared (radiologically and/or clinically): Yes    PHYSICAL EXAMINATION:  Vitals: /89   Pulse 74   Temp 36.7 °C (98 °F) (Temporal)   Resp 13   Ht 1.803 m (5' 11\")   Wt 100 kg (221 lb)   SpO2 98%   BMI 30.82 kg/m²   Constitutional:     General Appearance: appears stated age, is in no apparent distress, is well developed and well nourished.  HEENT:    Posterior scalp laceration approximated with suture. The pupils are equal, round, and reactive to light bilaterally. The extraocular muscles are intact bilaterally.. The nares and oropharynx are clear. The midface and jaw are stable. No malocclusion is evident.  Neck:    The cervical spine is supple and non tender. Normal range of motion. No posterior midline cervical-spine tenderness, no evidence of intoxication, normal level of alertness ([place] Coma Scale 15), no focal neurologic deficit, and no painful distracting injuries. The trachea is midline. No significant abrasions, lacerations, contusions, punctures, or swelling. No crepitance. No jugulovenous distension.  Respiratory:   Inspection: Unlabored respirations, no intercostal retractions, paradoxical motion, or accessory muscle use.   Palpation:  The chest is tender on the left. The clavicles are non deformed bilaterally..   Auscultation: clear to auscultation.  Cardiovascular:   Auscultation: regular rate and rhythm.   Peripheral Pulses: Normal.   Abdomen:   Abdomen is soft, nontender, without organomegaly or masses.  Genitourinary:   (MALE): Mckeon catheter in place with yellow/venkatesh urine.  Musculoskeletal:   The pelvis is stable.  No significant angulation, deformity, or soft tissue injury involving the upper and lower extremities. Normal range of motion.   Back:   The thoracolumbar spine was examined. Examination is remarkable for no significant tenderness, swelling, or deformity in the thoracolumbar " region.  Skin:   The skin is warm and dry.  Neurologic:    [place] Coma Scale (GCS) 15 [id number]. Neurologic examination revealed no focal deficits noted, mental status intact.  Psychiatric:   The patient does not appear depressed or anxious.    IMAGING:  DX-CHEST-PORTABLE (1 VIEW)   Final Result      No significant interval change.      CT-CTA HEAD WITH & W/O-POST PROCESS   Final Result      Posterior depressed skull fracture has focal absent superior sagittal dural venous sinus opacification at the level of the fracture. Upstream and downstream to the fracture there is normal contrast enhancement so this could indicate the mass effect    results in essentially flattening of the sinus. No thrombus is identified      CT-CHEST,ABDOMEN,PELVIS WITH   Final Result      Tiny left pneumothorax.      Fractures of the posterior lateral left fifth, sixth, seventh, eighth, ninth and 10th ribs, the anterolateral fifth, sixth, seventh, eighth, ninth and 10th ribs, and the anterior left third and fourth ribs.      No solid organ or vascular injury is identified.      Small anterior pneumomediastinum.      Findings discussed with Dr. Zapata.      CT-LSPINE W/O PLUS RECONS   Final Result      No fracture or subluxation is seen in the lumbar spine.                     CT-TSPINE W/O PLUS RECONS   Final Result      Fractures of the left sixth, seventh, eighth, ninth and 10th ribs.      Tiny left pneumothorax.         CT-CSPINE WITHOUT PLUS RECONS   Final Result      No CT evidence of acute cervical spine abnormality.      Trace cervical thoracic junction anterolisthesis is most likely degenerative      Minute left pneumothorax      CT-HEAD W/O   Final Result      No acute intracranial hemorrhage is identified despite posterior calvarium 9 mm depressed skull fracture which is mildly comminuted      Moderate sinus inflammatory disease      DX-CHEST-LIMITED (1 VIEW)   Final Result      Fractures of the left sixth, seventh and ninth ribs.       No pneumothorax is identified.         US-TRAUMA VEIN SCREEN LOWER BILAT EXTREMITY    (Results Pending)     All current laboratory studies/radiology exams reviewed: Yes    Completed Consultations:  Dr. Correia, neurosurgery     Pending Consultations:  Dr. Serna, neurology    Newly Identified Diagnoses and Injuries:  None    TOTAL RAP SCORE:  RAP Score Total: 7      ETOH Screening     Assessment complete date: 4/21/2021 (Admission BA negative, CAGE not completed)

## 2021-04-21 NOTE — ASSESSMENT & PLAN NOTE
4/20 Bladder scan with 886ml. Collins placed.  4/21 Flomax initiated.   4/22 Voiding post collins removal.

## 2021-04-21 NOTE — THERAPY
Physical Therapy   Initial Evaluation     Patient Name: Nadeem Araujo  Age:  46 y.o., Sex:  male  Medical Record #: [id number]  Today's Date: 4/21/2021          Assessment  Patient is 46 y.o. male admitted after fallin 12 feet.  He sustained posterior and anterior rib fx's 5-10.  CT L spine negative, CT cervical spine negative, despite having a depressed skull fx.  He lives in a second floor apartment w/ a friend.  He plans on going home w/ his sister, where there are only 4 steps to enter the home, and she will be able to provide assistance as needed.  He is rec'd alert, agreeable to work w/ PT.  He is able to move to/from the eob w/o assist.   He is able to stand and ambulate w/o loss of balance and w/o physical/mechanical assist.  He is able to move up/down several steps w/ spv.  No acute PT needs.  PT for d/c needs.  Plan    Recommend Physical Therapy for Evaluation only    DC Equipment Recommendations: None  Discharge Recommendations: Anticipate that the patient will have no further physical therapy needs after discharge from the hospital         Objective       04/21/21 1041   Prior Living Situation   Housing / Facility 1 Story House   Steps Into Home 4   Steps In Home 0   Rail Left Rail  (Steps into Home)   Equipment Owned None   Lives with - Patient's Self Care Capacity Unrelated Adult   Prior Level of Functional Mobility   Bed Mobility Independent   Transfer Status Independent   Ambulation Independent   Assistive Devices Used None   Stairs Independent   Balance Assessment   Sitting Balance (Static) Fair +   Sitting Balance (Dynamic) Fair +   Standing Balance (Static) Fair   Standing Balance (Dynamic) Fair   Weight Shift Sitting Good   Weight Shift Standing Good   Gait Analysis   Gait Level Of Assist Supervised   Assistive Device None   Distance (Feet) 200   Bed Mobility    Supine to Sit Supervised   Sit to Supine Supervised   Scooting Supervised   Functional Mobility   Sit to Stand Supervised   Bed,  Chair, Wheelchair Transfer Supervised   Anticipated Discharge Equipment and Recommendations   DC Equipment Recommendations None   Discharge Recommendations Anticipate that the patient will have no further physical therapy needs after discharge from the hospital

## 2021-04-21 NOTE — THERAPY
"Occupational Therapy   Initial Evaluation     Patient Name: Nadeem Araujo  Age:  46 y.o., Sex:  male  Medical Record #: [id number]  Today's Date: 4/21/2021       Precautions: Fall Risk  Comments: left sided rib pain    Assessment  Patient is 46 y.o. male with a diagnosis of left ant & post lateral rib fx 3-10 & a skull fx after falling 12 feet from a scaffolding onto the concrete.  Today pt was pleasant & co-operative & able to complete basic ADL's & functional mobility with supervision & extra time due to rib pain.  Pt reports he may D/C to his sister's home upon D/C so she can assist as needed.  Pt should continue to be OOB with Nsg & will progress well.  Pt is limited by rib pain which he is managing well so far.  Suspect pt will have no further Acute OT needs & be able to D/C home once medically cleared.    Plan    Recommend Occupational Therapy for Evaluation only.  D/C needs only    DC Equipment Recommendations: None  Discharge Recommendations: Anticipate that the patient will have no further occupational therapy needs after discharge from the hospital     Subjective    \"I don't have much pain when I'm still\"     Objective       04/21/21 1022   Prior Living Situation   Prior Services None   Housing / Facility 1 Story Apartment / Condo   Steps Into Home 14   Rail Both Rail (Steps into Home)   Elevator No   Bathroom Set up Bathtub / Shower Combination   Equipment Owned None   Lives with - Patient's Self Care Capacity Unrelated Adult;Friends   Comments Pt reports he lives in 2nd floor apt with his friend but is considering staying at his sister's house upon D/C which is a 1 story home   Pain 0 - 10 Group   Therapist Pain Assessment During Activity;Nurse Notified;6  (pt reports only pain is with movement, tolerable when still)   Cognition    Comments pt is very pleasant & co-opertive & motivated to work through his pain   Active ROM Upper Body   Comments LUE shoulder flex limited by rib pain   Balance " Assessment   Sitting Balance (Static) Fair +   Sitting Balance (Dynamic) Fair +   Standing Balance (Static) Fair   Standing Balance (Dynamic) Fair   Weight Shift Sitting Good   Weight Shift Standing Good   Bed Mobility    Supine to Sit Supervised   Sit to Supine Supervised   Scooting Supervised   Rolling Supervised   Comments pt instructed on how to get in/out of bed   ADL Assessment   Eating Modified Independent   Grooming Supervision;Standing   Upper Body Dressing Supervision   Lower Body Dressing Supervision   Toileting Supervision   Comments pt required extra time to complete task to manage his pain   Functional Mobility   Sit to Stand Supervised   Bed, Chair, Wheelchair Transfer Supervised   Toilet Transfers Supervised

## 2021-04-21 NOTE — CARE PLAN
Problem: Venous Thromboembolism (VTW)/Deep Vein Thrombosis (DVT) Prevention:  Goal: Patient will participate in Venous Thrombosis (VTE)/Deep Vein Thrombosis (DVT)Prevention Measures  Intervention: Ensure patient wears graduated elastic stockings (CATALINA hose) and/or SCDs, if ordered, when in bed or chair (Remove at least once per shift for skin check)  Note: Pt will wear SCDs while in bed except when performing 2 RN skin check, bed bath, and/or ambulation        Problem: Pain Management  Goal: Pain level will decrease to patient's comfort goal  Intervention: Follow pain managment plan developed in collaboration with patient and Interdisciplinary Team  Note: 0-10 pain assessment tool in place to identify patients pain. PRN medications available based on patients reported pain

## 2021-04-22 ENCOUNTER — APPOINTMENT (OUTPATIENT)
Dept: RADIOLOGY | Facility: MEDICAL CENTER | Age: 46
DRG: 964 | End: 2021-04-22
Attending: NURSE PRACTITIONER
Payer: COMMERCIAL

## 2021-04-22 LAB
ANION GAP SERPL CALC-SCNC: 6 MMOL/L (ref 7–16)
BASOPHILS # BLD AUTO: 0.3 % (ref 0–1.8)
BASOPHILS # BLD: 0.03 K/UL (ref 0–0.12)
BUN SERPL-MCNC: 13 MG/DL (ref 8–22)
CALCIUM SERPL-MCNC: 8.3 MG/DL (ref 8.5–10.5)
CHLORIDE SERPL-SCNC: 102 MMOL/L (ref 96–112)
CO2 SERPL-SCNC: 28 MMOL/L (ref 20–33)
CREAT SERPL-MCNC: 0.62 MG/DL (ref 0.5–1.4)
EOSINOPHIL # BLD AUTO: 0.08 K/UL (ref 0–0.51)
EOSINOPHIL NFR BLD: 0.9 % (ref 0–6.9)
ERYTHROCYTE [DISTWIDTH] IN BLOOD BY AUTOMATED COUNT: 42 FL (ref 35.9–50)
GLUCOSE SERPL-MCNC: 116 MG/DL (ref 65–99)
HCT VFR BLD AUTO: 29.9 % (ref 42–52)
HGB BLD-MCNC: 10 G/DL (ref 14–18)
IMM GRANULOCYTES # BLD AUTO: 0.03 K/UL (ref 0–0.11)
IMM GRANULOCYTES NFR BLD AUTO: 0.3 % (ref 0–0.9)
LYMPHOCYTES # BLD AUTO: 2.23 K/UL (ref 1–4.8)
LYMPHOCYTES NFR BLD: 24.2 % (ref 22–41)
MCH RBC QN AUTO: 30.8 PG (ref 27–33)
MCHC RBC AUTO-ENTMCNC: 33.4 G/DL (ref 33.7–35.3)
MCV RBC AUTO: 92 FL (ref 81.4–97.8)
MONOCYTES # BLD AUTO: 0.74 K/UL (ref 0–0.85)
MONOCYTES NFR BLD AUTO: 8 % (ref 0–13.4)
NEUTROPHILS # BLD AUTO: 6.09 K/UL (ref 1.82–7.42)
NEUTROPHILS NFR BLD: 66.3 % (ref 44–72)
NRBC # BLD AUTO: 0 K/UL
NRBC BLD-RTO: 0 /100 WBC
PLATELET # BLD AUTO: 189 K/UL (ref 164–446)
PMV BLD AUTO: 10.7 FL (ref 9–12.9)
POTASSIUM SERPL-SCNC: 3.7 MMOL/L (ref 3.6–5.5)
RBC # BLD AUTO: 3.25 M/UL (ref 4.7–6.1)
SODIUM SERPL-SCNC: 136 MMOL/L (ref 135–145)
WBC # BLD AUTO: 9.2 K/UL (ref 4.8–10.8)

## 2021-04-22 PROCEDURE — 80048 BASIC METABOLIC PNL TOTAL CA: CPT

## 2021-04-22 PROCEDURE — 71045 X-RAY EXAM CHEST 1 VIEW: CPT

## 2021-04-22 PROCEDURE — A9270 NON-COVERED ITEM OR SERVICE: HCPCS | Performed by: NURSE PRACTITIONER

## 2021-04-22 PROCEDURE — 700101 HCHG RX REV CODE 250: Performed by: SURGERY

## 2021-04-22 PROCEDURE — 85025 COMPLETE CBC W/AUTO DIFF WBC: CPT

## 2021-04-22 PROCEDURE — 36415 COLL VENOUS BLD VENIPUNCTURE: CPT

## 2021-04-22 PROCEDURE — 770006 HCHG ROOM/CARE - MED/SURG/GYN SEMI*

## 2021-04-22 PROCEDURE — 700101 HCHG RX REV CODE 250: Performed by: NURSE PRACTITIONER

## 2021-04-22 PROCEDURE — 700111 HCHG RX REV CODE 636 W/ 250 OVERRIDE (IP): Performed by: NURSE PRACTITIONER

## 2021-04-22 PROCEDURE — 94669 MECHANICAL CHEST WALL OSCILL: CPT

## 2021-04-22 PROCEDURE — 700102 HCHG RX REV CODE 250 W/ 637 OVERRIDE(OP): Performed by: NURSE PRACTITIONER

## 2021-04-22 RX ORDER — CARBOXYMETHYLCELLULOSE SODIUM 5 MG/ML
2 SOLUTION/ DROPS OPHTHALMIC PRN
Status: DISCONTINUED | OUTPATIENT
Start: 2021-04-22 | End: 2021-04-23 | Stop reason: HOSPADM

## 2021-04-22 RX ADMIN — ACETAMINOPHEN 1000 MG: 500 TABLET ORAL at 12:16

## 2021-04-22 RX ADMIN — ACETAMINOPHEN 1000 MG: 500 TABLET ORAL at 05:21

## 2021-04-22 RX ADMIN — LIDOCAINE 1 PATCH: 50 PATCH TOPICAL at 17:26

## 2021-04-22 RX ADMIN — FAMOTIDINE 20 MG: 20 TABLET ORAL at 05:21

## 2021-04-22 RX ADMIN — Medication 1 EACH: at 17:26

## 2021-04-22 RX ADMIN — ACETAMINOPHEN 1000 MG: 500 TABLET ORAL at 18:05

## 2021-04-22 RX ADMIN — GABAPENTIN 300 MG: 300 CAPSULE ORAL at 12:16

## 2021-04-22 RX ADMIN — DOCUSATE SODIUM 100 MG: 100 CAPSULE ORAL at 05:21

## 2021-04-22 RX ADMIN — POLYETHYLENE GLYCOL 3350 1 PACKET: 17 POWDER, FOR SOLUTION ORAL at 05:21

## 2021-04-22 RX ADMIN — METAXALONE 800 MG: 800 TABLET ORAL at 17:26

## 2021-04-22 RX ADMIN — MAGNESIUM HYDROXIDE 30 ML: 400 SUSPENSION ORAL at 05:21

## 2021-04-22 RX ADMIN — FAMOTIDINE 20 MG: 20 TABLET ORAL at 17:26

## 2021-04-22 RX ADMIN — AMLODIPINE BESYLATE 10 MG: 10 TABLET ORAL at 05:22

## 2021-04-22 RX ADMIN — TAMSULOSIN HYDROCHLORIDE 0.4 MG: 0.4 CAPSULE ORAL at 08:18

## 2021-04-22 RX ADMIN — GABAPENTIN 300 MG: 300 CAPSULE ORAL at 05:21

## 2021-04-22 RX ADMIN — ENOXAPARIN SODIUM 30 MG: 30 INJECTION SUBCUTANEOUS at 05:22

## 2021-04-22 RX ADMIN — METAXALONE 800 MG: 800 TABLET ORAL at 12:16

## 2021-04-22 RX ADMIN — POLYETHYLENE GLYCOL 3350 1 PACKET: 17 POWDER, FOR SOLUTION ORAL at 17:26

## 2021-04-22 RX ADMIN — DOCUSATE SODIUM 50 MG AND SENNOSIDES 8.6 MG 1 TABLET: 8.6; 5 TABLET, FILM COATED ORAL at 20:11

## 2021-04-22 RX ADMIN — Medication 1 EACH: at 05:21

## 2021-04-22 RX ADMIN — Medication 1 EACH: at 12:16

## 2021-04-22 RX ADMIN — DOCUSATE SODIUM 100 MG: 100 CAPSULE ORAL at 17:26

## 2021-04-22 RX ADMIN — ENOXAPARIN SODIUM 30 MG: 30 INJECTION SUBCUTANEOUS at 17:26

## 2021-04-22 RX ADMIN — GABAPENTIN 300 MG: 300 CAPSULE ORAL at 17:26

## 2021-04-22 RX ADMIN — ONDANSETRON 4 MG: 2 INJECTION INTRAMUSCULAR; INTRAVENOUS at 15:31

## 2021-04-22 RX ADMIN — METAXALONE 800 MG: 800 TABLET ORAL at 05:21

## 2021-04-22 ASSESSMENT — ENCOUNTER SYMPTOMS
BACK PAIN: 0
HEADACHES: 0
CHILLS: 0
ROS GI COMMENTS: BM PRIOR TO ARRIVAL
NAUSEA: 0
VOMITING: 0
SHORTNESS OF BREATH: 0
TINGLING: 0
ABDOMINAL PAIN: 0
DOUBLE VISION: 0
MYALGIAS: 1
NECK PAIN: 0
DIZZINESS: 0
BLURRED VISION: 0
FEVER: 0
SENSORY CHANGE: 0
SPEECH CHANGE: 0
FOCAL WEAKNESS: 0

## 2021-04-22 ASSESSMENT — PAIN DESCRIPTION - PAIN TYPE
TYPE: ACUTE PAIN

## 2021-04-22 ASSESSMENT — LIFESTYLE VARIABLES: SUBSTANCE_ABUSE: 0

## 2021-04-22 NOTE — PROGRESS NOTES
Pt arrived on unit via wheelchair to T407-1 in stable condition. Belongings at bedside, POC reviewed. IS encouraged

## 2021-04-22 NOTE — PROGRESS NOTES
Pt transported in wheelchair with CNA and RN to [facility]. Pt transported with all belongings - Pt to notify family of transport.

## 2021-04-22 NOTE — PROGRESS NOTES
Mckeon catheter removed at 1000. Educated patient on importance of hydration and urinal usage, will continue to monitor.

## 2021-04-22 NOTE — PROGRESS NOTES
Report received. Assumed care. A/O x4. VSS. Responds appropriately. Primarily Icelandic speaking. States to some discomfort when moving.     Assessment complete. Posterior scalp laceration with suture in place, scant serosanguinous drainage. Discomfort when moving and deep breaths. Mckeon catheter removed at 1000, educated patient on importance of hydration and urinal usage.     Discussed POC, pain management, medication regimen, ambulation, diet, Mckeon catheter, discharge planning, safety, pt verbalizes understanding. Explained importance of calling before getting OOB. Call light and belongings within reach. Bed in the lowest position. Treaded socks in place. Hourly rounding in progress. Will continue to monitor.

## 2021-04-22 NOTE — PROGRESS NOTES
Neurosurgery Progress Note    Subjective:  No events, pain controlled    Exam:  A&O  SHAH with FS, sensation grossly intact  PERRL, EOM intact, facial symmetry, tongue midline  posterior scalp laceration with macerated edges, mild serous discharge      BP  Min: 110/66  Max: 153/97  Pulse  Av.2  Min: 68  Max: 95  Resp  Av.2  Min: 10  Max: 33  Temp  Av.9 °C (98.5 °F)  Min: 36.4 °C (97.6 °F)  Max: 37.4 °C (99.4 °F)  Monitored Temp 2  Av.8 °C (98.3 °F)  Min: 36.6 °C (97.9 °F)  Max: 37.1 °C (98.8 °F)  SpO2  Av %  Min: 88 %  Max: 100 %    No data recorded    Recent Labs     21  1249 21  0409 21  0331   WBC 13.4* 12.9* 9.2   RBC 4.40* 3.62* 3.25*   HEMOGLOBIN 13.8* 11.2* 10.0*   HEMATOCRIT 40.2* 33.3* 29.9*   MCV 91.4 92.0 92.0   MCH 31.4 30.9 30.8   MCHC 34.3 33.6* 33.4*   RDW 41.1 43.3 42.0   PLATELETCT 285 226 189   MPV 11.2 11.0 10.7     Recent Labs     21  1249 21  0409 21  0331   SODIUM 141 130* 136   POTASSIUM 3.1* 4.0 3.7   CHLORIDE 102 98 102   CO2 26 27 28   GLUCOSE 169* 131* 116*   BUN 14 18 13   CREATININE 0.85 0.79 0.62   CALCIUM 8.7 8.3* 8.3*     Recent Labs     21  1249   APTT 27.7   INR 1.08           Intake/Output       21 - 21 0659 21 07 - 21 0659      9794-8937 0816-2554 Total 8532-6282 8667-4374 Total       Intake    P.O.  900  300 1200  --  -- --    P.O.  -- -- --    I.V.  446.7  -- 446.7  --  -- --    Volume (mL) (NS infusion) 446.7 -- 446.7 -- -- --    Other  0  -- 0  --  -- --    Medications (PO/Enteral Liquids) 0 -- 0 -- -- --    IV Piggyback  100  -- 100  --  -- --    Volume (mL) (ceFAZolin in dextrose (ANCEF) IVPB premix 2 g) 100 -- 100 -- -- --    Total Intake 1446.7 300 1746.7 -- -- --       Output    Urine  1250  975 2225  300  -- 300    Output (mL) (Urethral Catheter Temperature probe) 5990 606 9908 300 -- 300    Stool  --  -- --  --  -- --    Number of Times Stooled 0 x -- 0 x -- --  --    Total Output 1269 681 9030 300 -- 300       Net I/O     196.7 -675 -478.3 -300 -- -300            Intake/Output Summary (Last 24 hours) at 4/22/2021 0944  Last data filed at 4/22/2021 0800  Gross per 24 hour   Intake 1446.67 ml   Output 2525 ml   Net -1078.33 ml            • amLODIPine  10 mg DAILY   • gabapentin  300 mg TID   • tamsulosin  0.4 mg AFTER BREAKFAST   • enoxaparin (LOVENOX) injection  30 mg Q12HRS   • lidocaine  1-3 Patch DAILY   • Respiratory Therapy Consult   Continuous RT   • Pharmacy Consult Request  1 Each PHARMACY TO DOSE   • docusate sodium  100 mg BID   • senna-docusate  1 tablet Nightly   • senna-docusate  1 tablet Q24HRS PRN   • polyethylene glycol/lytes  1 Packet BID   • magnesium hydroxide  30 mL DAILY   • bisacodyl  10 mg Q24HRS PRN   • fleet  1 Each Once PRN   • acetaminophen  1,000 mg Q6HRS    Followed by   • [START ON 4/25/2021] acetaminophen  1,000 mg Q6HRS PRN   • oxyCODONE immediate-release  5 mg Q3HRS PRN    Or   • oxyCODONE immediate-release  10 mg Q3HRS PRN    Or   • morphine injection  4 mg Q3HRS PRN   • famotidine  20 mg BID   • ondansetron  4 mg Q4HRS PRN   • bacitracin-polymyxin b   TID   • metaxalone  800 mg TID       Assessment and Plan:  Hospital day #3 skull fx  POD #na  Prophylactic anticoagulation: no         Start date/time: tbd    CTV shows SSS open given depression of SSS from FX this is concerning for damage and high risk for thrombosis   Neurology on board, no role for anticoagulation at this time  Recommend observation on floor for full 48hr prior to discharge

## 2021-04-22 NOTE — CARE PLAN
Problem: Communication  Goal: The ability to communicate needs accurately and effectively will improve  Outcome: PROGRESSING AS EXPECTED  Note: Encouraged patient to discuss feelings in relation to POC. Will continue to monitor.      Problem: Safety  Goal: Will remain free from injury  Outcome: PROGRESSING AS EXPECTED  Note: Treaded socks in place, bed in the lowest position, call light and belongings within reach, pt call for assistance appropriately. Hourly rounding in place.      Problem: Knowledge Deficit  Goal: Knowledge of disease process/condition, treatment plan, diagnostic tests, and medications will improve  Outcome: PROGRESSING AS EXPECTED  Note: Educated patient on POC, discharge planning, medication regimen, pain management, Mckeon catheter, recording urine output, safety, ambulation, diet. Will continue to update patient on POC accordingly.

## 2021-04-22 NOTE — PROGRESS NOTES
Trauma / Surgical Daily Progress Note    Date of Service  4/22/2021    Chief Complaint  46 y.o. male admitted 4/20/2021 with trauma.     Interval Events    Transferred to [facility].   GCS 15.   Adequate pain control.   Neurology note reviewed. No indication at this time for ASA or AC prophylaxis for dural venous thrombosis.  PT/OT notes reviewed.    - Ambulate.   - DC [facility]  - Disposition: home when medically cleared.   - Counseled.    Review of Systems  Review of Systems   Constitutional: Negative for chills and fever.   HENT: Negative for hearing loss.    Eyes: Negative for blurred vision and double vision.   Respiratory: Negative for shortness of breath.    Cardiovascular: Negative for chest pain.   Gastrointestinal: Negative for abdominal pain, nausea and vomiting.        BM prior to arrival   Musculoskeletal: Positive for myalgias (left chest wall). Negative for back pain, joint pain and neck pain.   Skin: Negative for rash.   Neurological: Negative for dizziness, tingling, sensory change, speech change, focal weakness and headaches.   Psychiatric/Behavioral: Negative for substance abuse.        Vital Signs  Temp:  [36.4 °C (97.6 °F)-37.4 °C (99.4 °F)] 37.4 °C (99.4 °F)  Pulse:  [68-95] 85  Resp:  [10-33] 18  BP: (110-153)/(66-97) 119/74  SpO2:  [88 %-100 %] 93 %    Physical Exam  Physical Exam  Vitals and nursing note reviewed.   Constitutional:       General: He is awake. He is not in acute distress.     Appearance: He is well-developed. He is not ill-appearing.      Interventions: Nasal cannula in place.      Comments: Up to chair   HENT:      Head: Normocephalic.      Comments: Posterior scalp laceration approximated with suture     Right Ear: External ear normal.      Left Ear: External ear normal.      Nose: Nose normal.      Mouth/Throat:      Mouth: Mucous membranes are dry.   Eyes:      Extraocular Movements: Extraocular movements intact.      Pupils: Pupils are equal, round, and reactive to light.    Cardiovascular:      Rate and Rhythm: Normal rate and regular rhythm.      Pulses: Normal pulses.      Heart sounds: Normal heart sounds. No murmur.   Pulmonary:      Effort: Pulmonary effort is normal. No respiratory distress.      Breath sounds: Normal breath sounds. No stridor. No wheezing, rhonchi or rales.   Chest:      Chest wall: Tenderness (left chest) present.   Abdominal:      General: Bowel sounds are normal. There is no distension.      Palpations: Abdomen is soft.      Tenderness: There is no abdominal tenderness. There is no guarding.   Genitourinary:     Comments: Mckeon catheter in place with yellow/venkatesh urine  Musculoskeletal:         General: No swelling, tenderness, deformity or signs of injury.      Cervical back: Normal range of motion and neck supple. No rigidity or tenderness.      Comments: Moves all extremities   Skin:     General: Skin is warm and dry.   Neurological:      Mental Status: He is alert.      GCS: GCS eye subscore is 4. GCS verbal subscore is 5. GCS motor subscore is 6.   Psychiatric:         Mood and Affect: Mood normal.         Behavior: Behavior normal. Behavior is cooperative.         Laboratory  Recent Results (from the past 24 hour(s))   Basic Metabolic Panel    Collection Time: 04/22/21  3:31 AM   Result Value Ref Range    Sodium 136 135 - 145 mmol/L    Potassium 3.7 3.6 - 5.5 mmol/L    Chloride 102 96 - 112 mmol/L    Co2 28 20 - 33 mmol/L    Glucose 116 (H) 65 - 99 mg/dL    Bun 13 8 - 22 mg/dL    Creatinine 0.62 0.50 - 1.40 mg/dL    Calcium 8.3 (L) 8.5 - 10.5 mg/dL    Anion Gap 6.0 (L) 7.0 - 16.0   CBC with Differential: Tomorrow AM    Collection Time: 04/22/21  3:31 AM   Result Value Ref Range    WBC 9.2 4.8 - 10.8 K/uL    RBC 3.25 (L) 4.70 - 6.10 M/uL    Hemoglobin 10.0 (L) 14.0 - 18.0 g/dL    Hematocrit 29.9 (L) 42.0 - 52.0 %    MCV 92.0 81.4 - 97.8 fL    MCH 30.8 27.0 - 33.0 pg    MCHC 33.4 (L) 33.7 - 35.3 g/dL    RDW 42.0 35.9 - 50.0 fL    Platelet Count 189 164  - 446 K/uL    MPV 10.7 9.0 - 12.9 fL    Neutrophils-Polys 66.30 44.00 - 72.00 %    Lymphocytes 24.20 22.00 - 41.00 %    Monocytes 8.00 0.00 - 13.40 %    Eosinophils 0.90 0.00 - 6.90 %    Basophils 0.30 0.00 - 1.80 %    Immature Granulocytes 0.30 0.00 - 0.90 %    Nucleated RBC 0.00 /100 WBC    Neutrophils (Absolute) 6.09 1.82 - 7.42 K/uL    Lymphs (Absolute) 2.23 1.00 - 4.80 K/uL    Monos (Absolute) 0.74 0.00 - 0.85 K/uL    Eos (Absolute) 0.08 0.00 - 0.51 K/uL    Baso (Absolute) 0.03 0.00 - 0.12 K/uL    Immature Granulocytes (abs) 0.03 0.00 - 0.11 K/uL    NRBC (Absolute) 0.00 K/uL   ESTIMATED GFR    Collection Time: 04/22/21  3:31 AM   Result Value Ref Range    GFR If African American >60 >60 mL/min/1.73 m 2    GFR If Non African American >60 >60 mL/min/1.73 m 2       Fluids    Intake/Output Summary (Last 24 hours) at 4/22/2021 0905  Last data filed at 4/22/2021 0800  Gross per 24 hour   Intake 1446.67 ml   Output 2525 ml   Net -1078.33 ml       Core Measures & Quality Metrics  Labs reviewed, Medications reviewed and Radiology images reviewed  Mckeon catheter: Urinary Tract Retention or Urinary Tract Obstruction      DVT Prophylaxis: Enoxaparin (Lovenox)  DVT prophylaxis - mechanical: SCDs  Ulcer prophylaxis: Yes    Assessed for rehab: Patient returned to prior level of function, rehabilitation not indicated at this time    RAP Score Total: 7    ETOH Screening  CAGE Score: 0  Assessment complete date: 4/21/2021 (Admission BA negative, CAGE not completed)        Assessment/Plan  Pneumomediastinum (HCC)- (present on admission)  Assessment & Plan  Small anterior pneumomediastinum.  Cardiac monitoring negative for ectopy.  Aggressive pulmonary hygiene and multimodal pain management and serial chest radiography.     Skull fracture (HCC)- (present on admission)  Assessment & Plan  Posterior calvarium 9 mm depressed skull fracture which is mildly comminuted.  Approximation with the sagittal sinus, at risk for thrombosis.  No  discernable underlying brain injury.  CT Venogram demonstrates posterior depressed skull fracture has focal absent superior sagittal dural venous sinus opacification at the level of the fracture.  No thrombus identified.  Non-operative management.   4/21 Given risk for thrombus, neurology consult.  - Per neurology note there is no  indication at this time for ASA or AC prophylaxis for dural venous thrombosis.  Ellis Correia MD. Neurosurgeon. [facility].   Everett Serna MD. Neurology.    Closed fracture of multiple ribs of left side- (present on admission)  Assessment & Plan  Fractures of the posterior lateral left fifth, sixth, seventh, eighth, ninth and 10th ribs, the anterolateral fifth, sixth, seventh, eighth, ninth and 10th ribs, and the anterior left third and fourth ribs.  Aggressive pulmonary hygiene and multimodal pain management.     Urinary retention  Assessment & Plan  4/20 Bladder scan with 886ml. Mckeon placed.  4/21 Flomax initiated.     GERD (gastroesophageal reflux disease)- (present on admission)  Assessment & Plan  Chronic condition treated with omeprazole.  Pepcid while in the acute care setting.     Essential hypertension- (present on admission)  Assessment & Plan  Chronic condition treated with amlodipine.  Resumed maintenance medication on admission.     Screening examination for infectious disease- (present on admission)  Assessment & Plan  Admission SARS-CoV-2 testing negative. Repeat SARS-CoV-2 testing not indicated. Isolation precautions de-escalated.     No contraindication to deep vein thrombosis (DVT) prophylaxis- (present on admission)  Assessment & Plan  Prophylactic anticoagulation for thrombotic prevention initially contraindicated secondary to elevated bleeding risk.  RAP Score 7.  4/21 Trauma surveillance venous duplex scanning ordered.  4/21 Prophylactic dose enoxaparin initiated.      Trauma- (present on admission)  Assessment & Plan  Fall 12 ft from  scaffolding onto concrete.  Trauma Green Activation.  David Nam MD. Trauma Surgery.     Scalp laceration, initial encounter- (present on admission)  Assessment & Plan  Posterior scalp laceration.  Closed with interrupted Ethilon sutures in the ED.  Suture removal in 7 days (4/27).     Traumatic pneumothorax- (present on admission)  Assessment & Plan  No indication for chest tube at admit.  Minute left pneumothorax.  Supplemental oxygen to maintain SaO2 greater than 93%.  Aggressive pulmonary hygiene and serial chest radiography.  4/21 Not visualized on CXR.       Austin Pyle MD, FACS

## 2021-04-22 NOTE — CARE PLAN
Report called to Chava GALICIA on [facility]. Pt notified of transfer. Pt states that he will notify his family of transfer.   Problem: Safety  Goal: Will remain free from injury  Outcome: PROGRESSING AS EXPECTED  Goal: Will remain free from falls  Outcome: PROGRESSING AS EXPECTED     Problem: Pain Management  Goal: Pain level will decrease to patient's comfort goal  Outcome: PROGRESSING AS EXPECTED     Problem: Respiratory:  Goal: Respiratory status will improve  Outcome: PROGRESSING AS EXPECTED

## 2021-04-23 ENCOUNTER — HOSPITAL ENCOUNTER (OUTPATIENT)
Dept: RADIOLOGY | Facility: MEDICAL CENTER | Age: 46
End: 2021-04-23
Attending: NURSE PRACTITIONER
Payer: COMMERCIAL

## 2021-04-23 VITALS
BODY MASS INDEX: 28 KG/M2 | TEMPERATURE: 98.6 F | HEIGHT: 71 IN | HEART RATE: 89 BPM | DIASTOLIC BLOOD PRESSURE: 75 MMHG | OXYGEN SATURATION: 95 % | SYSTOLIC BLOOD PRESSURE: 120 MMHG | RESPIRATION RATE: 16 BRPM | WEIGHT: 200 LBS

## 2021-04-23 LAB
ANION GAP SERPL CALC-SCNC: 7 MMOL/L (ref 7–16)
BASOPHILS # BLD AUTO: 0.4 % (ref 0–1.8)
BASOPHILS # BLD: 0.03 K/UL (ref 0–0.12)
BUN SERPL-MCNC: 11 MG/DL (ref 8–22)
CALCIUM SERPL-MCNC: 8.7 MG/DL (ref 8.5–10.5)
CHLORIDE SERPL-SCNC: 103 MMOL/L (ref 96–112)
CO2 SERPL-SCNC: 28 MMOL/L (ref 20–33)
CREAT SERPL-MCNC: 0.63 MG/DL (ref 0.5–1.4)
EOSINOPHIL # BLD AUTO: 0.14 K/UL (ref 0–0.51)
EOSINOPHIL NFR BLD: 1.8 % (ref 0–6.9)
ERYTHROCYTE [DISTWIDTH] IN BLOOD BY AUTOMATED COUNT: 41.6 FL (ref 35.9–50)
GLUCOSE SERPL-MCNC: 105 MG/DL (ref 65–99)
HCT VFR BLD AUTO: 29.3 % (ref 42–52)
HGB BLD-MCNC: 10 G/DL (ref 14–18)
IMM GRANULOCYTES # BLD AUTO: 0.03 K/UL (ref 0–0.11)
IMM GRANULOCYTES NFR BLD AUTO: 0.4 % (ref 0–0.9)
LYMPHOCYTES # BLD AUTO: 2.03 K/UL (ref 1–4.8)
LYMPHOCYTES NFR BLD: 25.5 % (ref 22–41)
MCH RBC QN AUTO: 31.3 PG (ref 27–33)
MCHC RBC AUTO-ENTMCNC: 34.1 G/DL (ref 33.7–35.3)
MCV RBC AUTO: 91.6 FL (ref 81.4–97.8)
MONOCYTES # BLD AUTO: 0.72 K/UL (ref 0–0.85)
MONOCYTES NFR BLD AUTO: 9 % (ref 0–13.4)
NEUTROPHILS # BLD AUTO: 5.02 K/UL (ref 1.82–7.42)
NEUTROPHILS NFR BLD: 62.9 % (ref 44–72)
NRBC # BLD AUTO: 0 K/UL
NRBC BLD-RTO: 0 /100 WBC
PLATELET # BLD AUTO: 194 K/UL (ref 164–446)
PMV BLD AUTO: 10.7 FL (ref 9–12.9)
POTASSIUM SERPL-SCNC: 3.8 MMOL/L (ref 3.6–5.5)
RBC # BLD AUTO: 3.2 M/UL (ref 4.7–6.1)
SODIUM SERPL-SCNC: 138 MMOL/L (ref 135–145)
WBC # BLD AUTO: 8 K/UL (ref 4.8–10.8)

## 2021-04-23 PROCEDURE — 85025 COMPLETE CBC W/AUTO DIFF WBC: CPT

## 2021-04-23 PROCEDURE — 80048 BASIC METABOLIC PNL TOTAL CA: CPT

## 2021-04-23 PROCEDURE — 94760 N-INVAS EAR/PLS OXIMETRY 1: CPT

## 2021-04-23 PROCEDURE — 71045 X-RAY EXAM CHEST 1 VIEW: CPT

## 2021-04-23 PROCEDURE — 700102 HCHG RX REV CODE 250 W/ 637 OVERRIDE(OP): Performed by: NURSE PRACTITIONER

## 2021-04-23 PROCEDURE — 36415 COLL VENOUS BLD VENIPUNCTURE: CPT

## 2021-04-23 PROCEDURE — 700111 HCHG RX REV CODE 636 W/ 250 OVERRIDE (IP): Performed by: NURSE PRACTITIONER

## 2021-04-23 PROCEDURE — A9270 NON-COVERED ITEM OR SERVICE: HCPCS | Performed by: NURSE PRACTITIONER

## 2021-04-23 PROCEDURE — 700101 HCHG RX REV CODE 250: Performed by: NURSE PRACTITIONER

## 2021-04-23 RX ORDER — METAXALONE 800 MG/1
800 TABLET ORAL 3 TIMES DAILY PRN
Qty: 15 TABLET | Refills: 0 | Status: SHIPPED | OUTPATIENT
Start: 2021-04-23 | End: 2021-04-28

## 2021-04-23 RX ORDER — OXYCODONE HYDROCHLORIDE 5 MG/1
5 TABLET ORAL EVERY 6 HOURS PRN
Qty: 12 TABLET | Refills: 0 | Status: SHIPPED | OUTPATIENT
Start: 2021-04-23 | End: 2021-04-26

## 2021-04-23 RX ORDER — ACETAMINOPHEN 500 MG
TABLET ORAL
COMMUNITY
Start: 2021-04-23 | End: 2021-04-30

## 2021-04-23 RX ORDER — BACITRACIN ZINC AND POLYMYXIN B SULFATE 500; 1000 [USP'U]/G; [USP'U]/G
1 OINTMENT TOPICAL 3 TIMES DAILY
Qty: 1 G | Refills: 0 | Status: SHIPPED | OUTPATIENT
Start: 2021-04-23 | End: 2021-12-22

## 2021-04-23 RX ORDER — GABAPENTIN 300 MG/1
300 CAPSULE ORAL 3 TIMES DAILY
Qty: 21 CAPSULE | Refills: 0 | Status: SHIPPED | OUTPATIENT
Start: 2021-04-23 | End: 2021-04-30

## 2021-04-23 RX ORDER — LIDOCAINE 50 MG/G
1-3 PATCH TOPICAL DAILY
Qty: 21 PATCH | Refills: 0 | Status: SHIPPED | OUTPATIENT
Start: 2021-04-23 | End: 2021-04-30

## 2021-04-23 RX ORDER — TAMSULOSIN HYDROCHLORIDE 0.4 MG/1
0.4 CAPSULE ORAL
Qty: 7 CAPSULE | Refills: 0 | Status: SHIPPED | OUTPATIENT
Start: 2021-04-24 | End: 2021-05-01

## 2021-04-23 RX ADMIN — FAMOTIDINE 20 MG: 20 TABLET ORAL at 04:57

## 2021-04-23 RX ADMIN — METAXALONE 800 MG: 800 TABLET ORAL at 04:57

## 2021-04-23 RX ADMIN — POLYETHYLENE GLYCOL 3350 1 PACKET: 17 POWDER, FOR SOLUTION ORAL at 04:57

## 2021-04-23 RX ADMIN — ENOXAPARIN SODIUM 30 MG: 30 INJECTION SUBCUTANEOUS at 04:57

## 2021-04-23 RX ADMIN — DOCUSATE SODIUM 100 MG: 100 CAPSULE ORAL at 04:57

## 2021-04-23 RX ADMIN — TAMSULOSIN HYDROCHLORIDE 0.4 MG: 0.4 CAPSULE ORAL at 08:10

## 2021-04-23 RX ADMIN — ACETAMINOPHEN 1000 MG: 500 TABLET ORAL at 04:57

## 2021-04-23 RX ADMIN — GABAPENTIN 300 MG: 300 CAPSULE ORAL at 04:57

## 2021-04-23 RX ADMIN — MAGNESIUM HYDROXIDE 30 ML: 400 SUSPENSION ORAL at 04:56

## 2021-04-23 RX ADMIN — AMLODIPINE BESYLATE 10 MG: 10 TABLET ORAL at 04:57

## 2021-04-23 RX ADMIN — Medication 1 EACH: at 04:57

## 2021-04-23 ASSESSMENT — ENCOUNTER SYMPTOMS
SENSORY CHANGE: 0
VOMITING: 0
ABDOMINAL PAIN: 0
DOUBLE VISION: 0
MYALGIAS: 1
FEVER: 0
ROS GI COMMENTS: BM PRIOR TO ARRIVAL
SPEECH CHANGE: 0
CHILLS: 0
NECK PAIN: 0
FOCAL WEAKNESS: 0
HEADACHES: 0
BACK PAIN: 0
NAUSEA: 0
BLURRED VISION: 0
TINGLING: 0
DIZZINESS: 0
SHORTNESS OF BREATH: 0

## 2021-04-23 ASSESSMENT — PAIN DESCRIPTION - PAIN TYPE
TYPE: ACUTE PAIN
TYPE: ACUTE PAIN

## 2021-04-23 ASSESSMENT — LIFESTYLE VARIABLES: SUBSTANCE_ABUSE: 0

## 2021-04-23 NOTE — PROGRESS NOTES
D/C'd.  Discharge instructions provided to pt.  Pt states understanding.  Pt states all questions have been answered.  Copy of discharge provided to pt.  Signed copy in chart. Pt states that all personal belongings are in possession. Pt escorted off unit with assistance from CNA via wheelchair without incident. 2 PIVs removed.

## 2021-04-23 NOTE — CARE PLAN
Problem: Communication  Goal: The ability to communicate needs accurately and effectively will improve  Outcome: PROGRESSING AS EXPECTED     Problem: Safety  Goal: Will remain free from injury  Outcome: PROGRESSING AS EXPECTED     Problem: Pain Management  Goal: Pain level will decrease to patient's comfort goal  Outcome: PROGRESSING AS EXPECTED  Note: Declines pain at this time     Problem: Respiratory:  Goal: Respiratory status will improve  Outcome: PROGRESSING AS EXPECTED  Note: Tolerating room air

## 2021-04-23 NOTE — PROGRESS NOTES
Neurosurgery Progress Note    Subjective:  No events, pain controlled. No N/V, visual changes    Exam:  A&O  SHAH with FS, sensation grossly intact  PERRL, EOM intact, facial symmetry, tongue midline  posterior scalp laceration intact mild serous discharge      BP  Min: 117/80  Max: 127/85  Pulse  Av.9  Min: 75  Max: 96  Resp  Av.5  Min: 16  Max: 20  Temp  Av.7 °C (98.1 °F)  Min: 36.3 °C (97.4 °F)  Max: 37 °C (98.6 °F)  SpO2  Av.8 %  Min: 91 %  Max: 96 %    No data recorded    Recent Labs     21  04021  03321  0514   WBC 12.9* 9.2 8.0   RBC 3.62* 3.25* 3.20*   HEMOGLOBIN 11.2* 10.0* 10.0*   HEMATOCRIT 33.3* 29.9* 29.3*   MCV 92.0 92.0 91.6   MCH 30.9 30.8 31.3   MCHC 33.6* 33.4* 34.1   RDW 43.3 42.0 41.6   PLATELETCT 226 189 194   MPV 11.0 10.7 10.7     Recent Labs     21  04021  03321  0514   SODIUM 130* 136 138   POTASSIUM 4.0 3.7 3.8   CHLORIDE 98 102 103   CO2 27 28 28   GLUCOSE 131* 116* 105*   BUN 18 13 11   CREATININE 0.79 0.62 0.63   CALCIUM 8.3* 8.3* 8.7     Recent Labs     21  1249   APTT 27.7   INR 1.08           Intake/Output       21 0700 - 21 0659 21 0700 - 21 0659      5249-3400 1053-7421 Total 3918-8466 4265-1998 Total       Intake    P.O.  480  -- 480  --  -- --    P.O. 480 -- 480 -- -- --    Total Intake 480 -- 480 -- -- --       Output    Urine  480  2100 2580  0  -- 0    Number of Times Voided 1 x 2 x 3 x -- -- --    Urine Void (mL) -- 2100 0 -- 0    Output (mL) ([REMOVED] Urethral Catheter Temperature probe) 480 -- 480 -- -- --    Total Output 480 2100 2580 0 -- 0       Net I/O     0 - - 0 -- 0            Intake/Output Summary (Last 24 hours) at 2021 0910  Last data filed at 2021 0735  Gross per 24 hour   Intake 480 ml   Output 2280 ml   Net -1800 ml            • carboxymethylcellulose  2 Drop PRN   • amLODIPine  10 mg DAILY   • gabapentin  300 mg TID   • tamsulosin  0.4 mg AFTER  BREAKFAST   • enoxaparin (LOVENOX) injection  30 mg Q12HRS   • lidocaine  1-3 Patch DAILY   • Respiratory Therapy Consult   Continuous RT   • Pharmacy Consult Request  1 Each PHARMACY TO DOSE   • docusate sodium  100 mg BID   • senna-docusate  1 tablet Nightly   • senna-docusate  1 tablet Q24HRS PRN   • polyethylene glycol/lytes  1 Packet BID   • magnesium hydroxide  30 mL DAILY   • bisacodyl  10 mg Q24HRS PRN   • fleet  1 Each Once PRN   • acetaminophen  1,000 mg Q6HRS    Followed by   • [START ON 4/25/2021] acetaminophen  1,000 mg Q6HRS PRN   • oxyCODONE immediate-release  5 mg Q3HRS PRN    Or   • oxyCODONE immediate-release  10 mg Q3HRS PRN    Or   • morphine injection  4 mg Q3HRS PRN   • famotidine  20 mg BID   • ondansetron  4 mg Q4HRS PRN   • bacitracin-polymyxin b   TID   • metaxalone  800 mg TID       Assessment and Plan:  Hospital day #4 skull fx  POD #na  Prophylactic anticoagulation: no         Start date/time: tbd    CTV shows SSS open given depression of SSS from FX this is concerning for damage and high risk for thrombosis   Neurology on board, no role for anticoagulation at this time  Pt has remained stable on floor for 48hr, ok to discharge from NSGY POV with strict parameters to return to ED/call 911 with any neurologic change at home  nsgy team will sign off, please call with questions or concerns

## 2021-04-23 NOTE — CARE PLAN
Problem: Communication  Goal: The ability to communicate needs accurately and effectively will improve  Outcome: PROGRESSING AS EXPECTED  Note: Encouraged patient to discuss feelings in relation to POC. Will continue to monitor.      Problem: Safety  Goal: Will remain free from injury  Outcome: PROGRESSING AS EXPECTED  Note: Treaded socks in place, bed in the lowest position, call light and belongings within reach, pt call for assistance appropriately. Hourly rounding in place.      Problem: Knowledge Deficit  Goal: Knowledge of disease process/condition, treatment plan, diagnostic tests, and medications will improve  Outcome: PROGRESSING AS EXPECTED  Note: Educated patient on POC, pain management, medication administration, discharge planning, ambulation, diet, S/S of infection, safety, good hygiene practices. Will continue to update patient on POC accordingly.

## 2021-04-23 NOTE — PROGRESS NOTES
Trauma / Surgical Daily Progress Note    Date of Service  4/23/2021    Chief Complaint  46 y.o. male admitted 4/20/2021 with trauma.    Interval Events    No critical events overnight.  No overt changes in clinical exam.   Cleared by neurosurgery for discharge.   Voiding post collins removal.     - Disposition: Discharge.  - Counseled     Review of Systems  Review of Systems   Constitutional: Negative for chills and fever.   HENT: Negative for hearing loss.    Eyes: Negative for blurred vision and double vision.   Respiratory: Negative for shortness of breath.    Cardiovascular: Negative for chest pain.   Gastrointestinal: Negative for abdominal pain, nausea and vomiting.        BM prior to arrival   Musculoskeletal: Positive for myalgias (left chest wall). Negative for back pain, joint pain and neck pain.   Skin: Negative for rash.   Neurological: Negative for dizziness, tingling, sensory change, speech change, focal weakness and headaches.   Psychiatric/Behavioral: Negative for substance abuse.        Vital Signs  Temp:  [36.3 °C (97.4 °F)-37 °C (98.6 °F)] 37 °C (98.6 °F)  Pulse:  [75-96] 75  Resp:  [16-20] 18  BP: (117-127)/(75-85) 120/75  SpO2:  [91 %-96 %] 92 %    Physical Exam  Physical Exam  Vitals and nursing note reviewed.   Constitutional:       General: He is awake. He is not in acute distress.     Appearance: He is well-developed. He is not ill-appearing.      Interventions: Nasal cannula in place.      Comments: Up to chair   HENT:      Head: Normocephalic.      Comments: Posterior scalp laceration approximated with suture     Right Ear: External ear normal.      Left Ear: External ear normal.      Nose: Nose normal.      Mouth/Throat:      Mouth: Mucous membranes are dry.   Eyes:      Extraocular Movements: Extraocular movements intact.      Pupils: Pupils are equal, round, and reactive to light.   Cardiovascular:      Rate and Rhythm: Normal rate and regular rhythm.      Pulses: Normal pulses.       Heart sounds: Normal heart sounds. No murmur.   Pulmonary:      Effort: Pulmonary effort is normal. No respiratory distress.      Breath sounds: Normal breath sounds. No stridor. No wheezing, rhonchi or rales.   Chest:      Chest wall: Tenderness (left chest) present.   Abdominal:      General: Bowel sounds are normal. There is no distension.      Palpations: Abdomen is soft.      Tenderness: There is no abdominal tenderness. There is no guarding.   Musculoskeletal:         General: No swelling, tenderness, deformity or signs of injury.      Cervical back: Normal range of motion and neck supple. No rigidity or tenderness.      Comments: Moves all extremities   Skin:     General: Skin is warm and dry.   Neurological:      Mental Status: He is alert.      GCS: GCS eye subscore is 4. GCS verbal subscore is 5. GCS motor subscore is 6.   Psychiatric:         Mood and Affect: Mood normal.         Behavior: Behavior normal. Behavior is cooperative.         Laboratory  Recent Results (from the past 24 hour(s))   Basic Metabolic Panel    Collection Time: 04/23/21  5:14 AM   Result Value Ref Range    Sodium 138 135 - 145 mmol/L    Potassium 3.8 3.6 - 5.5 mmol/L    Chloride 103 96 - 112 mmol/L    Co2 28 20 - 33 mmol/L    Glucose 105 (H) 65 - 99 mg/dL    Bun 11 8 - 22 mg/dL    Creatinine 0.63 0.50 - 1.40 mg/dL    Calcium 8.7 8.5 - 10.5 mg/dL    Anion Gap 7.0 7.0 - 16.0   CBC with Differential: Tomorrow AM    Collection Time: 04/23/21  5:14 AM   Result Value Ref Range    WBC 8.0 4.8 - 10.8 K/uL    RBC 3.20 (L) 4.70 - 6.10 M/uL    Hemoglobin 10.0 (L) 14.0 - 18.0 g/dL    Hematocrit 29.3 (L) 42.0 - 52.0 %    MCV 91.6 81.4 - 97.8 fL    MCH 31.3 27.0 - 33.0 pg    MCHC 34.1 33.7 - 35.3 g/dL    RDW 41.6 35.9 - 50.0 fL    Platelet Count 194 164 - 446 K/uL    MPV 10.7 9.0 - 12.9 fL    Neutrophils-Polys 62.90 44.00 - 72.00 %    Lymphocytes 25.50 22.00 - 41.00 %    Monocytes 9.00 0.00 - 13.40 %    Eosinophils 1.80 0.00 - 6.90 %     Basophils 0.40 0.00 - 1.80 %    Immature Granulocytes 0.40 0.00 - 0.90 %    Nucleated RBC 0.00 /100 WBC    Neutrophils (Absolute) 5.02 1.82 - 7.42 K/uL    Lymphs (Absolute) 2.03 1.00 - 4.80 K/uL    Monos (Absolute) 0.72 0.00 - 0.85 K/uL    Eos (Absolute) 0.14 0.00 - 0.51 K/uL    Baso (Absolute) 0.03 0.00 - 0.12 K/uL    Immature Granulocytes (abs) 0.03 0.00 - 0.11 K/uL    NRBC (Absolute) 0.00 K/uL   ESTIMATED GFR    Collection Time: 04/23/21  5:14 AM   Result Value Ref Range    GFR If African American >60 >60 mL/min/1.73 m 2    GFR If Non African American >60 >60 mL/min/1.73 m 2       Fluids    Intake/Output Summary (Last 24 hours) at 4/23/2021 1012  Last data filed at 4/23/2021 0735  Gross per 24 hour   Intake 240 ml   Output 2100 ml   Net -1860 ml       Core Measures & Quality Metrics  Labs reviewed, Medications reviewed and Radiology images reviewed  Mckeon catheter: No Mckeon      DVT Prophylaxis: Enoxaparin (Lovenox)  DVT prophylaxis - mechanical: SCDs  Ulcer prophylaxis: Yes    Assessed for rehab: Patient returned to prior level of function, rehabilitation not indicated at this time    RAP Score Total: 7    ETOH Screening  CAGE Score: 0  Assessment complete date: 4/21/2021 (Admission BA negative, CAGE not completed)        Assessment/Plan  Pneumomediastinum (HCC)- (present on admission)  Assessment & Plan  Small anterior pneumomediastinum.  Cardiac monitoring negative for ectopy.  Aggressive pulmonary hygiene and multimodal pain management and serial chest radiography.      Skull fracture (HCC)- (present on admission)  Assessment & Plan  Posterior calvarium 9 mm depressed skull fracture which is mildly comminuted.  Approximation with the sagittal sinus, at risk for thrombosis.  No discernable underlying brain injury.  CT Venogram demonstrates posterior depressed skull fracture has focal absent superior sagittal dural venous sinus opacification at the level of the fracture.  No thrombus identified.  Non-operative  management.   4/21 Given risk for thrombus, neurology consult.  - Per neurology note there is no  indication at this time for ASA or AC prophylaxis for dural venous thrombosis.  Ellis Correia MD. Neurosurgeon. [facility].   Everett Serna MD. Neurology.     Closed fracture of multiple ribs of left side- (present on admission)  Assessment & Plan  Fractures of the posterior lateral left fifth, sixth, seventh, eighth, ninth and 10th ribs, the anterolateral fifth, sixth, seventh, eighth, ninth and 10th ribs, and the anterior left third and fourth ribs.  Aggressive pulmonary hygiene and multimodal pain management.    Urinary retention  Assessment & Plan  4/20 Bladder scan with 886ml. Collins placed.  4/21 Flomax initiated.   4/22 Voiding post collins removal.    GERD (gastroesophageal reflux disease)- (present on admission)  Assessment & Plan  Chronic condition treated with omeprazole.  Pepcid while in the acute care setting.      Essential hypertension- (present on admission)  Assessment & Plan  Chronic condition treated with amlodipine.  Resumed maintenance medication on admission.      Screening examination for infectious disease- (present on admission)  Assessment & Plan  Admission SARS-CoV-2 testing negative. Repeat SARS-CoV-2 testing not indicated. Isolation precautions de-escalated.      No contraindication to deep vein thrombosis (DVT) prophylaxis- (present on admission)  Assessment & Plan  Prophylactic anticoagulation for thrombotic prevention initially contraindicated secondary to elevated bleeding risk.  RAP Score 7.  4/21 Trauma screening bilateral lower extremity venous duplex negative for above knee DVT.  4/21 Prophylactic dose enoxaparin initiated.       Trauma- (present on admission)  Assessment & Plan  Fall 12 ft from scaffolding onto concrete.  Trauma Green Activation.  David Nam MD. Trauma Surgery.    Scalp laceration, initial encounter- (present on admission)  Assessment &  Plan  Posterior scalp laceration.  Closed with interrupted Ethilon sutures in the ED.  Suture removal in 7 days (4/27).      Traumatic pneumothorax- (present on admission)  Assessment & Plan  No indication for chest tube at admit.  Minute left pneumothorax.  Supplemental oxygen to maintain SaO2 greater than 93%.  Aggressive pulmonary hygiene and serial chest radiography.  4/21 Not visualized on CXR.        Austin Pyle MD, FACS

## 2021-04-23 NOTE — PROGRESS NOTES
Report received. Assumed care. A/O x4. VSS. Responds appropriately. States to slight discomfort to left chest wall relating to fib fractures while moving.     Assessment complete. Posterior scalp laceration with suture in place, cdi, no s/s of infection. Call light and belongings within reach. Bed in the lowest position. Treaded socks in place. Hourly rounding in progress. Will continue to monitor.

## 2021-04-23 NOTE — DISCHARGE INSTRUCTIONS
Discharge Instructions    1.  Call or seek medical attention if questions or concerns arise   2.  Follow up with primary care provider within one weeks time, as needed, or if symptoms worsen   3.  Follow up with Dr. David Pyle, trauma surgery, within one weeks time, as needed, if symptoms worsen, and for wound check   4.  Follow up with Dr. Ellis Correia, neurosurgery, within one weeks time, as needed, if symptoms worsen.   5.  Follow up with Dr. Everett Serna, neurology, as needed or if symptoms worsen.   6.  No nonsteroidal antiinflammatories, aspirin or blood thinners until cleared by neurosurgery.   7.  No contact sports, heavy lifting, or strenuous activities until cleared by outpatient provider.   8.  No swimming, hot tubs, baths or wound submersion. May shower.   9.  Sutures to posterior scalp maybe removed by a medical provider on 4/27/2021.   10. No operation of machinery or motorized vehicles under the influence of narcotics and until cleared by neurosurgery.   11. No alcohol use under the influence of narcotics and until cleared by neurosurgery.   12. Seek immediate medical attention for neurologic decompensation/changes, pulmonary decompensation, and/or signs and symptoms of infection.    Discharged to home by car with relative. Discharged via wheelchair, hospital escort: Yes.  Special equipment needed: Not Applicable    Be sure to schedule a follow-up appointment with your primary care doctor or any specialists as instructed.     Discharge Plan:   Diet Plan: Discussed  Activity Level: Discussed  Confirmed Follow up Appointment: Patient to Call and Schedule Appointment  Confirmed Symptoms Management: Discussed  Medication Reconciliation Updated: Yes    I understand that a diet low in cholesterol, fat, and sodium is recommended for good health. Unless I have been given specific instructions below for another diet, I accept this instruction as my diet prescription.   Other diet: Regular    Special  Instructions: None    · Is patient discharged on Warfarin / Coumadin?   No       Laceration Care, Adult  A laceration is a cut that may go through all layers of the skin. The cut may also go into the tissue that is right under the skin. Some cuts heal on their own. Others need to be closed with stitches (sutures), staples, skin adhesive strips, or skin glue. Taking care of your injury lowers your risk of infection, helps your injury to heal better, and may prevent scarring.  Supplies needed:  · Soap.  · Water.  · Hand .  · Bandage (dressing).  · Antibiotic ointment.  · Clean towel.  How to take care of your cut  Wash your hands with soap and water before touching your wound or changing your bandage. If soap and water are not available, use hand .  If your doctor used stitches or staples:  · Keep the wound clean and dry.  · If you were given a bandage, change it at least once a day as told by your doctor. You should also change it if it gets wet or dirty.  · Keep the wound completely dry for the first 24 hours, or as told by your doctor. After that, you may take a shower or a bath. Do not get the wound soaked in water until after the stitches or staples have been removed.  · Clean the wound once a day, or as told by your doctor:  ? Wash the wound with soap and water.  ? Rinse the wound with water to remove all soap.  ? Pat the wound dry with a clean towel. Do not rub the wound.  · After you clean the wound, put a thin layer of antibiotic ointment on it as told by your doctor. This ointment:  ? Helps to prevent infection.  ? Keeps the bandage from sticking to the wound.  · Have your stitches or staples removed as told by your doctor.  If your doctor used skin adhesive strips:  · Keep the wound clean and dry.  · If you were given a bandage, you should change it at least once a day as told by your doctor. You should also change it if it gets wet or dirty.  · Do not get the skin adhesive strips wet. You  can take a shower or a bath, but keep the wound dry.  · If the wound gets wet, pat it dry with a clean towel. Do not rub the wound.  · Skin adhesive strips fall off on their own. You can trim the strips as the wound heals. Do not remove any strips that are still stuck to the wound. They will fall off after a while.  If your doctor used skin glue:  · Try to keep your wound dry, but you may briefly wet it in the shower or bath. Do not soak the wound in water, such as by swimming.  · After you take a shower or a bath, gently pat the wound dry with a clean towel. Do not rub the wound.  · Do not do any activities that will make you really sweaty until the skin glue has fallen off on its own.  · Do not apply liquid, cream, or ointment medicine to your wound while the skin glue is still on.  · If you were given a bandage, you should change it at least once a day or as told by your doctor. You should also change it if it gets dirty or wet.  · If a bandage is placed over the wound, do not let the tape touch the skin glue.  · Do not pick at the glue. The skin glue usually stays on for 5-10 days. Then, it falls off the skin.  General instructions    · Take over-the-counter and prescription medicines only as told by your doctor.  · If you were given antibiotic medicine or ointment, take or apply it as told by your doctor. Do not stop using it even if your condition improves.  · Do not scratch or pick at the wound.  · Check your wound every day for signs of infection. Watch for:  ? Redness, swelling, or pain.  ? Fluid, blood, or pus.  · Raise (elevate) the injured area above the level of your heart while you are sitting or lying down.  · If directed, put ice on the affected area:  ? Put ice in a plastic bag.  ? Place a towel between your skin and the bag.  ? Leave the ice on for 20 minutes, 2-3 times a day.  · Prevent scarring by covering your wound with sunscreen of at least 30 SPF whenever you are outside after your wound has  healed.  · Keep all follow-up visits as told by your doctor. This is important.  Get help if:  · You got a tetanus shot and you have any of these problems at the injection site:  ? Swelling.  ? Very bad pain.  ? Redness.  ? Bleeding.  · You have a fever.  · A wound that was closed breaks open.  · You notice a bad smell coming from your wound or your bandage.  · You notice something coming out of the wound, such as wood or glass.  · Medicine does not relieve your pain.  · You have more redness, swelling, or pain at the site of your wound.  · You have fluid, blood, or pus coming from your wound.  · You notice a change in the color of your skin near your wound.  · You need to change the bandage often because fluid, blood, or pus is coming from the wound.  · You start to have a new rash.  · You start to have numbness around the wound.  Get help right away if:  · You have very bad swelling around the wound.  · Your pain suddenly gets worse and is very bad.  · You notice painful lumps near the wound or anywhere on your body.  · You have a red streak going away from your wound.  · The wound is on your hand or foot, and:  ? You cannot move a finger or toe.  ? Your fingers or toes look pale or bluish.  Summary  · A laceration is a cut that may go through all layers of the skin. The cut may also go into the tissue right under the skin.  · Some cuts heal on their own. Others need to be closed with stitches, staples, skin adhesive strips, or skin glue.  · Follow your doctor's instructions for caring for your cut. Proper care of a cut lowers the risk of infection, helps the cut heal better, and prevents scarring.  This information is not intended to replace advice given to you by your health care provider. Make sure you discuss any questions you have with your health care provider.  Document Released: 06/05/2009 Document Revised: 02/15/2019 Document Reviewed: 01/07/2019  [employer] Patient Education © 2020 [employer]      Cuidado de un desgarro, en adultos  Laceration Care, Adult  Un desgarro es un timbo que puede atravesar todas las capas de la piel. El timbo también puede llegar al tejido que está debajo de la piel. Algunos alvarenga cicatrizan por sí solos. Otros se deben cerrar con puntos (suturas), grapas, tiras adhesivas para la piel o goma para cerrar la piel. Cuidar de la lesión reduce el riesgo de infección, ayuda a que la lesión se cure mejor, y puede prevenir la formación de cicatrices.  Materiales necesarios:  · Jabón.  · Agua.  · Desinfectante de je.  · Venda (vendaje).  · Ungüento antibiótico.  · Toalla limpia.  Cómo cuidar del timbo  Lávese las je con agua y jabón antes de tocarse la herida y cambiar la venda. Use desinfectante para je si no dispone de agua y jabón.  Si el médico utilizó puntos o grapas:  · Mantenga la herida limpia y seca.  · Si le colocaron elisa venda, cámbiela por lo menos elisa vez por día según se lo haya indicado el médico. También debe cambiarla si se moja o se ensucia.  · Mantenga la herida completamente seca fanta las primeras 24 horas o peter se lo haya indicado el médico. Después, podrá ducharse o marcos un baño de inmersión. No sumerja la herida en agua hasta que le hayan quitado los puntos o las grapas.  · Limpie la herida elisa vez por día o peter se lo haya indicado el médico:  ? Lave la herida con agua y jabón.  ? Enjuáguela con agua para quitar todo el jabón.  ? Séquela dando palmaditas con elisa toalla limpia. No frote la herida.  · Después de limpiar la herida, aplique elisa capa delgada de ungüento con antibiótico peter se lo haya indicado el médico. Sara ungüento:  ? Ayuda a prevenir elisa infección.  ? Jeannie que la venda se adhiera a la herida.  · Christina que le retiren los puntos o las grapas peter se lo haya indicado el médico.  Si el médico utilizó tiras adhesivas para la piel:  · Mantenga la herida limpia y seca.  · Si le colocaron elisa venda, debe cambiarla por lo menos elisa vez  por día peter se lo haya indicado el médico. También debe cambiarla si se moja o se ensucia.  · No deje que las tiras adhesivas para la piel se mojen. Puede bañarse o ducharse, tommy mantenga la herida seca.  · Si se moja, séquela dando palmaditas con elisa toalla limpia. No frote la herida.  · Las tiras adhesivas para la piel se caen solas. Puede recortar las tiras a medida que la herida cicatriza. No quite las tiras que aún están pegadas a la herida. Se caerán después de un tiempo.  Si el médico utilizó goma para cerrar la piel:  · Trate de mantener la herida seca; sin embargo, puede mojarla ligeramente cuando se bañe o se duche. No sumerja la herida en el agua, por ejemplo, al nadar.  · Después de ducharse o bañarse, seque la herida con cuidado dando palmaditas con elisa toalla limpia. No frote la herida.  · No practique actividades que lo yadira transpirar mucho hasta que la goma para cerrar la piel se haya salido livier.  · No aplique líquidos, cremas ni ungüentos medicinales en la herida mientras esté la goma para cerrar la piel.  · Si le colocaron elisa venda, debe cambiarla por lo menos elisa vez por día o peter se lo haya indicado el médico. También debe cambiarla si se ensucia o se moja.  · Si le colocan elisa venda sobre la herida, no deje que la cinta toque la goma para cerrar la piel.  · No toque la goma. La goma para cerrar la piel suele permanecer en la piel de 5 a 10 días. Luego, se sale solo de la piel.  Indicaciones generales    · Orr los medicamentos de venta prakash y los recetados solamente peter se lo haya indicado el médico.  · Si le indicaron un ungüento o un medicamento con antibiótico, aplíquelo o tómelo peter se lo haya dicho el médico. No deje de usarlo aunque la afección mejore.  · No se rasque ni se toque la herida.  · Controle la herida todos los días para detectar signos de infección. Esté atento a lo siguiente:  ? Dolor, hinchazón o enrojecimiento.  ? Líquido, clayton o pus.  · Cuando esté sentado o  acostado, levante (eleve) la fidelina de la lesión por encima del nivel del corazón.  · Si se lo indican, aplique hielo sobre la fidelina afectada:  ? Ponga el hielo en elisa bolsa plástica.  ? Coloque elisa toalla entre la piel y la bolsa de hielo.  ? Coloque el hielo fanta 20 minutos, 2 a 3 veces por día.  · Evite la formación de cicatrices aplicándose pantalla solar con factor de protección solar (FPS) de 30 sobre la herida, peter mínimo, siempre que esté al aire prakash después de que la herida haya cicatrizado.  · Concurra a todas las visitas de seguimiento peter se lo haya indicado el médico. [place] es importante.  Solicite ayuda si:  · Recibió elisa vacuna antitetánica y tiene cualquiera de estos problemas en el sitio de la inyección:  ? Hinchazón.  ? Dolor intenso.  ? Enrojecimiento.  ? Sangrado.  · Tiene fiebre.  · Elisa herida que estaba cerrada y se abre.  · Percibe que sale mal olor de la herida o de la venda.  · Nota un cuerpo extraño en la herida, peter un trozo de peralta o daniel.  · Los medicamentos no le calman el dolor.  · Tiene más enrojecimiento, hinchazón o dolor en el lugar de la herida.  · Presenta líquido, clayton o pus que provienen de la herida.  · Observa que la piel cerca de la herida cambia de color.  · Debe cambiar la venda con frecuencia debido a que hay secreción de líquido, clayton o pus de la herida.  · Tiene elisa erupción cutánea nueva.  · Comienza a tener adormecimiento alrededor de la herida.  Solicite ayuda de inmediato si:  · Hay mucha hinchazón alrededor de la herida.  · El dolor empeora repentinamente y es muy intenso.  · Percibe bultos dolorosos cerca de la herida o en cualquier parte del cuerpo.  · Tiene elisa línea nathalie que sale de la herida.  · La herida está en la mano o en el pie y:  ? No puede  los dedos.  ? Los dedos se jolly pálidos o azulados.  Resumen  · Un desgarro es un timbo que puede atravesar todas las capas de la piel. El timbo también puede llegar al tejido que está kaden debajo  de la piel.  · Algunos alvarenga cicatrizan por sí solos. Otros se deben cerrar con puntos, grapas, tiras adhesivas para la piel o goma para cerrar la piel.  · Siga las indicaciones del médico respecto del cuidado de haq timbo. El cuidado adecuado de un timbo reduce el riesgo de infección, ayuda a que el timbo cierre mejor, y previene la formación de cicatrices.  Esta información no tiene peter fin reemplazar el consejo del médico. Asegúrese de hacerle al médico cualquier pregunta que tenga.  Document Released: 08/15/2012 Document Revised: 02/24/2019 Document Reviewed: 02/24/2019  [employer] Patient Education © 2020 [employer]      Depression / Suicide Risk    As you are discharged from this [facility] facility, it is important to learn how to keep safe from harming yourself.    Recognize the warning signs:  · Abrupt changes in personality, positive or negative- including increase in energy   · Giving away possessions  · Change in eating patterns- significant weight changes-  positive or negative  · Change in sleeping patterns- unable to sleep or sleeping all the time   · Unwillingness or inability to communicate  · Depression  · Unusual sadness, discouragement and loneliness  · Talk of wanting to die  · Neglect of personal appearance   · Rebelliousness- reckless behavior  · Withdrawal from people/activities they love  · Confusion- inability to concentrate     If you or a loved one observes any of these behaviors or has concerns about self-harm, here's what you can do:  · Talk about it- your feelings and reasons for harming yourself  · Remove any means that you might use to hurt yourself (examples: pills, rope, extension cords, firearm)  · Get professional help from the community (Mental Health, Substance Abuse, psychological counseling)  · Do not be alone:Call your Safe Contact- someone whom you trust who will be there for you.  · Call your local CRISIS HOTLINE 211-7705 or 894-698-1861  · Call your local Children's  Mobile Crisis Response Team [place] (992) 155-2670 or www.SimpleGeo.SafeAwake  · Call the toll free National Suicide Prevention Hotlines   · National Suicide Prevention Lifeline 505-036-WEPD (1580)  · National Hope Line Network 800-SUICIDE (677-9296)

## 2021-04-23 NOTE — DISCHARGE PLANNING
Anticipated Discharge Disposition:   Home     Action:   This RN CM is following the case and per [place] HASSAN , plan is to discharge Pt to home without psychosocial needs.    Barriers to Discharge:   None    Plan:   Will continue to assist Pt with discharge as needed

## 2021-04-24 NOTE — DISCHARGE SUMMARY
DATE OF ADMISSION:  04/20/2021   DATE OF DISCHARGE:  04/23/2021     LENGTH OF STAY:  Three days.     ATTENDING PHYSICIAN:  David Pyle MD, trauma surgery.     CONSULTING PHYSICIANS:  1.  Ellis Correia MD, neurosurgery.  2.  Everett Serna MD, neurology.     DISCHARGE DIAGNOSES:  1.  Trauma sustained from a 12-foot fall from a scaffolding onto concrete.  2.  Closed fractures of multiple ribs on left side.  3.  Skull fracture.  4.  Pneumomediastinum.  5.  Urinary retention.  6.  Traumatic pneumothorax.  7.  Scalp laceration.  8.  No contraindication to deep vein thrombosis (DVT) prophylaxis.  9.  Screening examination for infectious disease.  10.  History of essential hypertension.  11.  History of gastroesophageal reflux disease.     PROCEDURES:  None.     HISTORY OF PRESENT ILLNESS:  The patient is a very pleasant 46-year-old male   who reportedly fell 12 feet from a scaffolding onto concrete below.  Review of   the records indicate that he struck the back of his head and left chest wall.    He was subsequently transported to [facility] in [place], [place] for definitive trauma care.  He was triaged as a trauma green   activation in accordance with established prehospital protocols.     HOSPITAL COURSE:  On arrival, the patient received expeditious primary and   secondary surveys with required adjuncts and underwent extensive radiographic   and laboratory studies.  He was admitted to the critical care team under the   direction and supervision of Dr. David Pyle.  He sustained the above   injuries and incurred the above diagnoses during his stay.  Screening   examination for infectious disease COVID-19 was negative.     The patient transferred from the Emergency Department to the trauma intensive   care unit for ongoing resuscitation, evaluation and completion of a tertiary   exam.  Dr. Ellis Correia, neurosurgery was consulted for a posterior calvarium   9 mm depressed skull  fracture.  This was mildly comminuted and there was   approximation with sagittal sinus.  There was an increased risk for thrombosis   and there was no discernible underlying brain injury.  Given the concern for   risk for thrombosis, a CT venogram was completed.  This demonstrated a   posterior depressed skull fracture that had focal absent superior sagittal   dural venous sinus opacification at the level of the fracture.  There was no   thrombus identified.  Given this, neurology was consulted, Dr. Everett Serna.    Per neurology, there was no indication for aspirin or anticoagulation   prophylaxis for the dural venous injury.     A CT imaging of thorax demonstrated multiple left-sided rib fractures of   numbers 5th, 6th, 7th, 8th, 9th, 10th as well as the 3rd and 4th. There was a   small minute left pneumothorax as well as a small anterior pneumomediastinum.    He was managed with aggressive pulmonary hygiene, multimodal pain control and   serial chest radiography.  He did not require mechanical ventilation or the   placement of a chest tube.  On the day of discharge, the patient was on room   air.  He had no leukocytosis.  His chest x-ray demonstrated no effusions, no   pneumothorax.     A Mckeon catheter was placed on 4/20/2021 for retention 886 mL, Flomax was   initiated.  The Mckeon was ultimately removed on 04/22 and the patient was   voiding successfully after.     He further sustained a posterior scalp laceration.  This was closed with   interrupted Ethilon sutures in the Emergency Department. A suture removal may   occur in approximately seven days' time and this would be 4/27/2021.     Past medical history was significant for essential hypertension and   gastroesophageal reflux disease.  His home medications amlodipine and   omeprazole were resumed on admission.     The patient transferred from the trauma intensive care unit to the general   surgical floor.  On the day of discharge, the patient was a  Loree coma score   of 15 with no focal neurological deficits.  His posterior scalp laceration   was well approximated with no overt signs and symptoms of infection.  He had   no acute respiratory distress, was on room air.  He was afebrile, nontoxic in   appearance and did not have a leukocytosis.  He was voiding clear yellow   urine, tolerating an oral diet.  He was subsequently ready to be discharged to   home in good condition on 4/23/2021.     DISCHARGE PHYSICAL EXAMINATION:  Please see exam dated 4/23/2021.     DISCHARGE MEDICATIONS:  1.  Narcotics check was completed as provided by [facility].  2.  The opioid risk tool was completed.  3.  Tylenol 500 mg tablet he may take over the counter as directed for pain.  4.  Polysporin 500-10,000 units per gram ointment he may apply to his   abrasions 3 times a day or as needed, dispensed one tube, refills zero.  5.  Gabapentin 300 mg capsules, take one capsule by mouth three times a day   for seven days, dispensed 21 capsules, refills zero.  6.  Lidocaine 5% patch, he place 1-3 patches on the skin every day for seven   days, dispensed 21 patches, refills zero.  7.  Skelaxin 800 mg capsule, take one capsule by mouth three times a day as   needed for up to five days, dispensed 15 capsules, refills zero.  8.  Oxycodone (Roxicodone 5 mg tablet, take one tablet by mouth every six   hours as needed for up to three days, dispensed 12 tablets, refills zero).  9.  Flomax 0.4 mg capsule, take one capsule by mouth half hour before   breakfast for seven days, dispensed seven capsules, refills zero.     DISPOSITION:  The patient will be discharged to home in good condition on   4/23/2021.  He will follow up with Dr. David Pyle, trauma surgery and Dr. Ellis Correia, neurosurgery within one week's time as needed or if symptoms   worsen and for wound check and suture removal.  He may follow up with Dr. Everett Serna, neurology as needed or if symptoms  worsen. He will follow up with   his primary care provider within one week's time as needed or if symptoms   worsen.  The patient was extensively counseled.  All of his questions had been   answered.  Special attention was paid to the signs and symptoms of infection,   respiratory decompensation and/or neurological decompensation and to seek   immediate medical attention if these do develop.  He demonstrated   understanding of discharge instructions and gave verbal compliance.     Time spent on discharge 45 minutes.        ______________________________  CHARO MONTES        ______________________________  MD MARILIN Giles/RU/DOTTY    DD:  04/23/2021 10:29  DT:  04/23/2021 14:24    Job#:  [id number]    CC:MD Everett Munguia MD

## 2021-04-27 ENCOUNTER — OCCUPATIONAL MEDICINE (OUTPATIENT)
Dept: OCCUPATIONAL MEDICINE | Facility: CLINIC | Age: 46
End: 2021-04-27
Payer: COMMERCIAL

## 2021-04-27 VITALS
TEMPERATURE: 98.7 F | OXYGEN SATURATION: 96 % | WEIGHT: 220 LBS | DIASTOLIC BLOOD PRESSURE: 84 MMHG | SYSTOLIC BLOOD PRESSURE: 138 MMHG | RESPIRATION RATE: 14 BRPM | BODY MASS INDEX: 31.5 KG/M2 | HEIGHT: 70 IN | HEART RATE: 110 BPM

## 2021-04-27 DIAGNOSIS — S02.91XD CLOSED FRACTURE OF SKULL WITH ROUTINE HEALING, UNSPECIFIED BONE, SUBSEQUENT ENCOUNTER: ICD-10-CM

## 2021-04-27 DIAGNOSIS — S22.42XD CLOSED FRACTURE OF MULTIPLE RIBS OF LEFT SIDE WITH ROUTINE HEALING, SUBSEQUENT ENCOUNTER: ICD-10-CM

## 2021-04-27 DIAGNOSIS — S01.01XD LACERATION OF SCALP, SUBSEQUENT ENCOUNTER: ICD-10-CM

## 2021-04-27 PROCEDURE — 99204 OFFICE O/P NEW MOD 45 MIN: CPT | Performed by: PREVENTIVE MEDICINE

## 2021-04-27 ASSESSMENT — FIBROSIS 4 INDEX: FIB4 SCORE: 1.47

## 2021-04-27 NOTE — PROGRESS NOTES
Subjective:     Nadeem Araujo is a 46 y.o. male who presents for Follow-Up ([facility] New To You DOI: 4/20/21 Head; same Rm 17)      DOI 4/20/2021: 46-year-old male who reportedly fell 12 feet from a scaffolding onto concrete below. He struck the back of his head and left chest wall.  He was seen in the ER.  CT head showed posterior calvarium 9 mm depressed skull fracture.  Neurology and neurosurgery were consulted, no interventions were deemed necessary.  CT chest/abdomen pelvis showedmultiple left-sided rib fractures of numbers 5th, 6th, 7th, 8th, 9th, 10th as well as the 3rd and 4th.  CT also showed pneumothorax, however patient did not require chest tube or intubation.  He did sustain a scalp laceration which was repaired with sutures.  He was discharged on 4/23/2021 on room air with oxycodone, Skelaxin's, gabapentin, lidocaine 5% patches and Tylenol.  He is advised to follow-up with primary care, neurosurgery (Dr Correia) and trauma surgery (Dr Pyle).  Patient states that overall the most painful part is the left side of his chest wall.  He states it hurts to breathe, bend or twist and hurts pretty consistently.  He states medications help a little bit but still has significant pain.  Patient states that the back of his head hurts but is not as bad, he states the pain comes and goes but is not too severe.  He also has a little bit of neck pain which is pretty mild at this point.  Denies radiating pain, numbness or tingling.  Denies back pain or any right-sided pain.  Denies nausea, vomiting, dizziness, visual or hearing disturbances.    ROS: All systems were reviewed on intake form, form was reviewed and signed. See scanned documents in media. Pertinent positives and negatives included in HPI.    PMH: No pertinent past medical history to this problem  MEDS: Medications were reviewed in Epic  ALLERGIES: No Known Allergies  SOCHX: Works as a daniel at [employer]  FH: No pertinent family history to  "this problem       Objective:     /84   Pulse (!) 110   Temp 37.1 °C (98.7 °F)   Resp 14   Ht 1.778 m (5' 10\")   Wt 99.8 kg (220 lb)   SpO2 96%   BMI 31.57 kg/m²     Constitutional: Patient is in no acute distress. Appears well-developed and well-nourished.   HENT: Normocephalic and atraumatic. EOM are normal. No scleral icterus.   Cardiovascular: Normal rate.    Pulmonary/Chest: Effort normal. No respiratory distress.   Neurological: Patient is alert and oriented to person, place, and time.   Skin: Skin is warm and dry.   Psychiatric: Normal mood and affect. Behavior is normal.     Neuro: Alert and oriented x3.  Cranial nerves grossly intact.  Head: 3 cm U-shaped laceration posterior occiput.  Wound edges well approximated.  No erythema, swelling or drainage.  Cervical: No gross deformity.  Diffuse tenderness of the lower bilateral paraspinal musculature.  Full range of motion.  Chest wall: Significant tenderness over the left lateral chest wall.  Good chest wall movement.  Clear to auscultation all fields.  Breath sounds equal bilaterally    Assessment/Plan:       1. Closed fracture of skull with routine healing, unspecified bone, subsequent encounter    2. Closed fracture of multiple ribs of left side with routine healing, subsequent encounter    3. Laceration of scalp, subsequent encounter    Temporarily Totally Disabled FROM 4/27/2021 TO 4/30/2021     Patient states that work comp is in the process of setting him up with appointments with neurosurgery and trauma surgery, recommend keeping these appointments  We will leave in sutures for few more days  Continue oxycodone, Skelaxin, gabapentin Tylen  ol as prescribed  TTD  Follow-up Friday for suture removal    Differential diagnosis, natural history, supportive care, and indications for immediate follow-up discussed.    Approximately 45 minutes were spent in reviewing notes, preparing for visit, obtaining history, exam and evaluation, patient " counseling/education and post visit documentation/orders.

## 2021-04-27 NOTE — LETTER
[address],   [address] [place] [address]  Phone:  [phone] - Fax:  [phone]   Occupational Health [facility] Progress Report and Disability Certification  Date of Service: 4/27/2021   No Show:  No  Date / Time of Next Visit: 4/30/2021 1:30 PM   Claim Information   Patient Name: Nadeem Araujo  Claim Number:     Employer: [employer]  Date of Injury: 4/20/2021     Insurer / TPA: Marga Assigned Risk  ID / SSN:     Occupation: [employer]  Diagnosis: Diagnoses of Closed fracture of skull with routine healing, unspecified bone, subsequent encounter, Closed fracture of multiple ribs of left side with routine healing, subsequent encounter, and Laceration of scalp, subsequent encounter were pertinent to this visit.    Medical Information   Related to Industrial Injury? Yes    Subjective Complaints:  DOI 4/20/2021: 46-year-old male who reportedly fell 12 feet from a scaffolding onto concrete below. He struck the back of his head and left chest wall.  He was seen in the ER.  CT head showed posterior calvarium 9 mm depressed skull fracture.  Neurology and neurosurgery were consulted, no interventions were deemed necessary.  CT chest/abdomen pelvis showedmultiple left-sided rib fractures of numbers 5th, 6th, 7th, 8th, 9th, 10th as well as the 3rd and 4th.  CT also showed pneumothorax, however patient did not require chest tube or intubation.  He did sustain a scalp laceration which was repaired with sutures.  He was discharged on 4/23/2021 on room air with oxycodone, Skelaxin's, gabapentin, lidocaine 5% patches and Tylenol.  He is advised to follow-up with primary care, neurosurgery (Dr Correia) and trauma surgery (Dr Pyle).  Patient states that overall the most painful part is the left side of his chest wall.  He states it hurts to breathe, bend or twist and hurts pretty consistently.  He states medications help a little bit but still has  significant pain.  Patient states that the back of his head hurts but is not as bad, he states the pain comes and goes but is not too severe.  He also has a little bit of neck pain which is pretty mild at this point.  Denies radiating pain, numbness or tingling.  Denies back pain or any right-sided pain.  Denies nausea, vomiting, dizziness, visual or hearing disturbances.   Objective Findings: Neuro: Alert and oriented x3.  Cranial nerves grossly intact.  Head: 3 cm U-shaped laceration posterior occiput.  Wound edges well approximated.  No erythema, swelling or drainage.  Cervical: No gross deformity.  Diffuse tenderness of the lower bilateral paraspinal musculature.  Full range of motion.  Chest wall: Significant tenderness over the left lateral chest wall.  Good chest wall movement.  Clear to auscultation all fields.  Breath sounds equal bilaterally   Pre-Existing Condition(s):     Assessment:   Condition Same    Status: Additional Care Required  Permanent Disability:No    Plan:      Diagnostics:      Comments:  Patient states that work comp is in the process of setting him up with appointments with neurosurgery and trauma surgery, recommend keeping these appointments  We will leave in sutures for few more days  Continue oxycodone, Skelaxin, gabapentin Tylen  ol as prescribed  TTD  Follow-up Friday for suture removal    Disability Information   Status: Temporarily Totally Disabled    From:  4/27/2021  Through: 4/30/2021 Restrictions are: Temporary   Physical Restrictions   Sitting:    Standing:    Stooping:    Bending:      Squatting:    Walking:    Climbing:    Pushing:      Pulling:    Other:    Reaching Above Shoulder (L):   Reaching Above Shoulder (R):       Reaching Below Shoulder (L):    Reaching Below Shoulder (R):      Not to exceed Weight Limits   Carrying(hrs):   Weight Limit(lb):   Lifting(hrs):   Weight  Limit(lb):     Comments:      Repetitive Actions   Hands: i.e. Fine Manipulations from Grasping:       Feet: i.e. Operating Foot Controls:     Driving / Operate Machinery:     Provider Name:   Сергей Sterling D.O. Physician Signature:  Physician Name:     Clinic Name / Location: [address],   [address]  [place] [address] Clinic Phone Number: Dept: [phone]   Appointment Time: 3:45 Pm Visit Start Time: 3:36 PM   Check-In Time:  3:29 Pm Visit Discharge Time:  4:10 PM   Original-Treating Physician or Chiropractor    Page 2-Insurer/TPA    Page 3-Employer    Page 4-Employee

## 2021-04-30 ENCOUNTER — OCCUPATIONAL MEDICINE (OUTPATIENT)
Dept: OCCUPATIONAL MEDICINE | Facility: CLINIC | Age: 46
End: 2021-04-30
Payer: COMMERCIAL

## 2021-04-30 VITALS
HEIGHT: 70 IN | RESPIRATION RATE: 16 BRPM | TEMPERATURE: 99.1 F | SYSTOLIC BLOOD PRESSURE: 144 MMHG | HEART RATE: 95 BPM | OXYGEN SATURATION: 96 % | BODY MASS INDEX: 30.78 KG/M2 | DIASTOLIC BLOOD PRESSURE: 84 MMHG | WEIGHT: 215 LBS

## 2021-04-30 DIAGNOSIS — S01.01XD LACERATION OF SCALP, SUBSEQUENT ENCOUNTER: ICD-10-CM

## 2021-04-30 DIAGNOSIS — S02.91XD CLOSED FRACTURE OF SKULL WITH ROUTINE HEALING, UNSPECIFIED BONE, SUBSEQUENT ENCOUNTER: ICD-10-CM

## 2021-04-30 DIAGNOSIS — S22.42XD CLOSED FRACTURE OF MULTIPLE RIBS OF LEFT SIDE WITH ROUTINE HEALING, SUBSEQUENT ENCOUNTER: ICD-10-CM

## 2021-04-30 PROCEDURE — 99213 OFFICE O/P EST LOW 20 MIN: CPT | Performed by: PREVENTIVE MEDICINE

## 2021-04-30 RX ORDER — TRAMADOL HYDROCHLORIDE 50 MG/1
50 TABLET ORAL EVERY 8 HOURS PRN
Qty: 30 TABLET | Refills: 0 | Status: SHIPPED | OUTPATIENT
Start: 2021-04-30 | End: 2021-05-14

## 2021-04-30 RX ORDER — ACETAMINOPHEN 500 MG
1000 TABLET ORAL 3 TIMES DAILY PRN
Qty: 60 TABLET | Refills: 0 | Status: SHIPPED | OUTPATIENT
Start: 2021-04-30 | End: 2021-12-22

## 2021-04-30 ASSESSMENT — ENCOUNTER SYMPTOMS
DIZZINESS: 0
PHOTOPHOBIA: 0
SENSORY CHANGE: 0
DOUBLE VISION: 0
FOCAL WEAKNESS: 0
HEADACHES: 1
TINGLING: 0
BLURRED VISION: 0

## 2021-04-30 ASSESSMENT — FIBROSIS 4 INDEX: FIB4 SCORE: 1.47

## 2021-04-30 NOTE — LETTER
[address],   [address] [place] [address]  Phone:  [phone] - Fax:  [phone]   Occupational Health [facility] Progress Report and Disability Certification  Date of Service: 4/30/2021   No Show:  No  Date / Time of Next Visit: 5/14/2021 @ 3:15 PM    Claim Information   Patient Name: Nadeem Araujo  Claim Number:     Employer: [employer]  Date of Injury: 4/20/2021     Insurer / TPA: Marga Assigned Risk  ID / SSN:     Occupation: [employer]  Diagnosis: Diagnoses of Closed fracture of skull with routine healing, unspecified bone, subsequent encounter, Closed fracture of multiple ribs of left side with routine healing, subsequent encounter, and Laceration of scalp, subsequent encounter were pertinent to this visit.    Medical Information   Related to Industrial Injury? Yes    Subjective Complaints:  DOI 4/20/2021: 46-year-old male who reportedly fell 12 feet from a scaffolding onto concrete below. He struck the back of his head and left chest wall. See prior note and hospital discharge for detailed history. Patient presents for suture removal.  Patient states overall symptoms are the same.  Continues no significant pain at the left chest wall.  He states he ran out of oxycodone and tamsulosin yesterday.  He has cyclobenzaprine but he thinks it is too strong so is not really taking it.  He states he was told that the specialist did not feel that he needed to follow-up with them and so does not have another appointment yet.   Objective Findings: Neuro: Alert and oriented x3.  Cranial nerves grossly intact.  Head: 3 cm U-shaped laceration posterior occiput, 2 sutures intact.  Wound edges well approximated.  No erythema, swelling or drainage.  Cervical: No gross deformity.  Diffuse tenderness of the lower bilateral paraspinal musculature.  Full range of motion.  Chest wall: Significant tenderness over the left lateral chest wall.  Good  chest wall movement.    Removed 2 sutures from the scalp without complication   Pre-Existing Condition(s):     Assessment:   Condition Same    Status: Additional Care Required  Permanent Disability:No    Plan:      Diagnostics:      Comments:  Prescribed tramadol 50 mg to use 3 times daily as needed for pain.  Prescribed Tylenol 1000 mg to use 3 times daily as needed  May use cyclobenzaprine 10 mg at night as needed  TTD  Follow-up 2 weeks    Disability Information   Status: Temporarily Totally Disabled    From:  4/30/2021  Through: 5/14/2021 Restrictions are: Temporary   Physical Restrictions   Sitting:    Standing:    Stooping:    Bending:      Squatting:    Walking:    Climbing:    Pushing:      Pulling:    Other:    Reaching Above Shoulder (L):   Reaching Above Shoulder (R):       Reaching Below Shoulder (L):    Reaching Below Shoulder (R):      Not to exceed Weight Limits   Carrying(hrs):   Weight Limit(lb):   Lifting(hrs):   Weight  Limit(lb):     Comments:      Repetitive Actions   Hands: i.e. Fine Manipulations from Grasping:     Feet: i.e. Operating Foot Controls:     Driving / Operate Machinery:     Provider Name:   Сергей Sterling D.O. Physician Signature:  Physician Name:     Clinic Name / Location: [address],   [address] [place] [address] Clinic Phone Number: Dept: [phone]   Appointment Time: 1:30 Pm Visit Start Time: 1:13 PM   Check-In Time:  1:11 Pm Visit Discharge Time: 1:40 PM    Original-Treating Physician or Chiropractor    Page 2-Insurer/TPA    Page 3-Employer    Page 4-Employee

## 2021-04-30 NOTE — PROGRESS NOTES
"Subjective:     Nadeem Araujo is a 46 y.o. male who presents for Follow-Up (WC DOI: 4/20/21 Head; Same Rm 16)      DOI 4/20/2021: 46-year-old male who reportedly fell 12 feet from a scaffolding onto concrete below. He struck the back of his head and left chest wall. See prior note and hospital discharge for detailed history. Patient presents for suture removal.  Patient states overall symptoms are the same.  Continues no significant pain at the left chest wall.  He states he ran out of oxycodone and tamsulosin yesterday.  He has cyclobenzaprine but he thinks it is too strong so is not really taking it.  He states he was told that the specialist did not feel that he needed to follow-up with them and so does not have another appointment yet.    Review of Systems   HENT: Negative for hearing loss.    Eyes: Negative for blurred vision, double vision and photophobia.   Neurological: Positive for headaches. Negative for dizziness, tingling, sensory change and focal weakness.            Objective:     /84   Pulse 95   Temp 37.3 °C (99.1 °F)   Resp 16   Ht 1.778 m (5' 10\")   Wt 97.5 kg (215 lb)   SpO2 96%   BMI 30.85 kg/m²     Constitutional: Patient is in no acute distress. Appears well-developed and well-nourished.   Cardiovascular: Normal rate.    Pulmonary/Chest: Effort normal. No respiratory distress.   Skin: Skin is warm and dry.   Psychiatric: Normal mood and affect. Behavior is normal.     Neuro: Alert and oriented x3.  Cranial nerves grossly intact.  Head: 3 cm U-shaped laceration posterior occiput, 2 sutures intact.  Wound edges well approximated.  No erythema, swelling or drainage.  Cervical: No gross deformity.  Diffuse tenderness of the lower bilateral paraspinal musculature.  Full range of motion.  Chest wall: Significant tenderness over the left lateral chest wall.  Good chest wall movement.    Removed 2 sutures from the scalp without complication    Assessment/Plan:       1. Closed " fracture of skull with routine healing, unspecified bone, subsequent encounter  - traMADol (ULTRAM) 50 MG Tab; Take 1 tablet by mouth every 8 hours as needed for Moderate Pain or Severe Pain for up to 14 days.  Dispense: 30 tablet; Refill: 0  - acetaminophen (TYLENOL) 500 MG Tab; Take 2 Tablets by mouth 3 times a day as needed.  Dispense: 60 tablet; Refill: 0    2. Closed fracture of multiple ribs of left side with routine healing, subsequent encounter  - traMADol (ULTRAM) 50 MG Tab; Take 1 tablet by mouth every 8 hours as needed for Moderate Pain or Severe Pain for up to 14 days.  Dispense: 30 tablet; Refill: 0  - acetaminophen (TYLENOL) 500 MG Tab; Take 2 Tablets by mouth 3 times a day as needed.  Dispense: 60 tablet; Refill: 0    3. Laceration of scalp, subsequent encounter  - traMADol (ULTRAM) 50 MG Tab; Take 1 tablet by mouth every 8 hours as needed for Moderate Pain or Severe Pain for up to 14 days.  Dispense: 30 tablet; Refill: 0  - acetaminophen (TYLENOL) 500 MG Tab; Take 2 Tablets by mouth 3 times a day as needed.  Dispense: 60 tablet; Refill: 0    Other orders  - Consent for Opiate Prescription    Temporarily Totally Disabled FROM 4/30/2021 TO 5/14/2021       Prescribed tramadol 50 mg to use 3 times daily as needed for pain.  Prescribed Tylenol 1000 mg to use 3 times daily as needed  May use cyclobenzaprine 10 mg at night as needed  TTD  Follow-up 2 weeks    Differential diagnosis, natural history, supportive care, and indications for immediate follow-up discussed.    Approximately 25 minutes was spent in preparing for visit, obtaining history, exam and evaluation, patient counseling/education and post visit documentation/orders.

## 2021-05-14 ENCOUNTER — APPOINTMENT (OUTPATIENT)
Dept: RADIOLOGY | Facility: IMAGING CENTER | Age: 46
End: 2021-05-14
Attending: PREVENTIVE MEDICINE
Payer: COMMERCIAL

## 2021-05-14 ENCOUNTER — OCCUPATIONAL MEDICINE (OUTPATIENT)
Dept: OCCUPATIONAL MEDICINE | Facility: CLINIC | Age: 46
End: 2021-05-14
Payer: COMMERCIAL

## 2021-05-14 VITALS
OXYGEN SATURATION: 98 % | RESPIRATION RATE: 14 BRPM | WEIGHT: 215 LBS | TEMPERATURE: 98 F | HEIGHT: 70 IN | HEART RATE: 111 BPM | BODY MASS INDEX: 30.78 KG/M2

## 2021-05-14 DIAGNOSIS — S39.012D STRAIN OF LUMBAR REGION, SUBSEQUENT ENCOUNTER: ICD-10-CM

## 2021-05-14 DIAGNOSIS — S46.912D STRAIN OF LEFT SHOULDER, SUBSEQUENT ENCOUNTER: ICD-10-CM

## 2021-05-14 DIAGNOSIS — S02.91XD CLOSED FRACTURE OF SKULL WITH ROUTINE HEALING, UNSPECIFIED BONE, SUBSEQUENT ENCOUNTER: ICD-10-CM

## 2021-05-14 DIAGNOSIS — S22.42XD CLOSED FRACTURE OF MULTIPLE RIBS OF LEFT SIDE WITH ROUTINE HEALING, SUBSEQUENT ENCOUNTER: ICD-10-CM

## 2021-05-14 PROCEDURE — 99213 OFFICE O/P EST LOW 20 MIN: CPT | Performed by: PREVENTIVE MEDICINE

## 2021-05-14 PROCEDURE — 73030 X-RAY EXAM OF SHOULDER: CPT | Mod: TC,LT | Performed by: PREVENTIVE MEDICINE

## 2021-05-14 RX ORDER — IBUPROFEN 800 MG/1
800 TABLET ORAL EVERY 8 HOURS PRN
Qty: 90 TABLET | Refills: 0 | Status: SHIPPED | OUTPATIENT
Start: 2021-05-14 | End: 2021-06-09

## 2021-05-14 RX ORDER — TIZANIDINE 4 MG/1
4 TABLET ORAL
Qty: 20 TABLET | Refills: 0 | Status: SHIPPED | OUTPATIENT
Start: 2021-05-14 | End: 2021-07-08 | Stop reason: SDUPTHER

## 2021-05-14 ASSESSMENT — ENCOUNTER SYMPTOMS
COUGH: 0
FEVER: 0
CHILLS: 0
SENSORY CHANGE: 0
WHEEZING: 0
TINGLING: 0
SHORTNESS OF BREATH: 0
DIZZINESS: 1
HEADACHES: 1

## 2021-05-14 ASSESSMENT — FIBROSIS 4 INDEX: FIB4 SCORE: 1.47

## 2021-05-14 NOTE — PROGRESS NOTES
"Subjective:     Nadeem Araujo is a 46 y.o. male who presents for Follow-Up (WC DOI: 4/20/21 Head same - RM 16)      DOI 4/20/2021: 46-year-old male who reportedly fell 12 feet from a scaffolding onto concrete below. He struck the back of his head and left chest wall. See prior note and hospital discharge for detailed history.  Patient states overall he has had a little bit of improvement but has noted some other symptoms.  He states that he continues to have left anterior to lateral chest wall tenderness and pain but this is somewhat improved.  He notes some increased shoulder pain over the last 2 weeks on the left side.  He had noted difficulty with left shoulder range of motion before but thought that would improve but still has trouble lifting his shoulder above shoulder level.  Patient states that he also has some increasing low back pain on the left side and appears to have some swelling on the left flank.  He states that he has pain over the posterior school as well.  He states has been taking tramadol twice daily but states he thinks he can stop taking it at this point.  He was taking some ibuprofen Tylenol as well before.  He does note another symptom he has been having his dizziness when going from laying to standing or seated to standing.  This occurs more frequently in the morning.  He also notes he has an appointment with the neurologist in 2 weeks.    Review of Systems   Constitutional: Negative for chills and fever.   Respiratory: Negative for cough, shortness of breath and wheezing.    Neurological: Positive for dizziness and headaches. Negative for tingling and sensory change.       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       Objective:     Pulse (!) 111   Temp 36.7 °C (98 °F) (Temporal)   Resp 14   Ht 1.778 m (5' 10\")   Wt 97.5 kg (215 lb)   SpO2 98%   BMI 30.85 kg/m²     Constitutional: Patient is in no acute distress. " Appears well-developed and well-nourished.   Cardiovascular: Normal rate.    Skin: Skin is warm and dry.   Psychiatric: Normal mood and affect. Behavior is normal.     Neuro: Alert and oriented x3.  Cranial nerves grossly intact.  Head: Area of pain diffusely over the posterior scalp little bit more so on the left side.  Wound well healed.  Cervical: No gross deformity.  Diffuse tenderness of the lower bilateral paraspinal musculature.  Full range of motion.  Left shoulder: No gross deformity.  Tenderness over the anterior GH and lateral shoulder.  Range of motion diminished to less than about 120 degrees flexion/abduction.  Chest wall: Moderate tenderness over the left lateral and anterior chest wall.  Good chest wall movement.  Lumbar: No gross deformity.  Tenderness diffusely over the left lower paraspinal musculature to the left hip.  Range of motion slight diminished due to pain.    XR Left Shoulder: No acute deformity as read by me, Subacute posterolateral rib fractures are depressed    Assessment/Plan:       1. Closed fracture of skull with routine healing, unspecified bone, subsequent encounter  - ibuprofen (MOTRIN) 800 MG Tab; Take 1 tablet by mouth every 8 hours as needed.  Dispense: 90 tablet; Refill: 0    2. Closed fracture of multiple ribs of left side with routine healing, subsequent encounter  - tizanidine (ZANAFLEX) 4 MG Tab; Take 1 tablet by mouth at bedtime as needed.  Dispense: 20 tablet; Refill: 0  - ibuprofen (MOTRIN) 800 MG Tab; Take 1 tablet by mouth every 8 hours as needed.  Dispense: 90 tablet; Refill: 0    3. Strain of left shoulder, subsequent encounter  - DX-SHOULDER 2+ LEFT; Future  - tizanidine (ZANAFLEX) 4 MG Tab; Take 1 tablet by mouth at bedtime as needed.  Dispense: 20 tablet; Refill: 0  - ibuprofen (MOTRIN) 800 MG Tab; Take 1 tablet by mouth every 8 hours as needed.  Dispense: 90 tablet; Refill: 0  - REFERRAL TO RADIOLOGY  - MR-SHOULDER-W/O LEFT; Future    4. Strain of lumbar  region, subsequent encounter  - tizanidine (ZANAFLEX) 4 MG Tab; Take 1 tablet by mouth at bedtime as needed.  Dispense: 20 tablet; Refill: 0  - ibuprofen (MOTRIN) 800 MG Tab; Take 1 tablet by mouth every 8 hours as needed.  Dispense: 90 tablet; Refill: 0    Temporarily Totally Disabled FROM 5/14/2021 TO 6/4/2021       Referral for MRI left shoulder  Prescribed tizanidine to use at night  Prescribed ibuprofen 800 mg 3 times daily  May use heat/ice as needed  May use OTC muscle creams/ointments as needed  TTD  Follow-up 3 weeks    Differential diagnosis, natural history, supportive care, and indications for immediate follow-up discussed.    Approximately 25 minutes was spent in preparing for visit, obtaining history, exam and evaluation, patient counseling/education and post visit documentation/orders.

## 2021-05-14 NOTE — LETTER
[address],   [address] [place] [address]  Phone:  [phone] - Fax:  [phone]   Occupational Health [facility] Progress Report and Disability Certification  Date of Service: 5/14/2021   No Show:  No  Date / Time of Next Visit: 6/4/2021 @ 315pm   Claim Information   Patient Name: Nadeem Araujo  Claim Number:     Employer: [email or web address]  Date of Injury: 4/20/2021     Insurer / TPA: Marga Assigned Risk  ID / SSN:     Occupation: [employer]  Diagnosis: Diagnoses of Closed fracture of skull with routine healing, unspecified bone, subsequent encounter, Closed fracture of multiple ribs of left side with routine healing, subsequent encounter, Strain of left shoulder, subsequent encounter, and Strain of lumbar region, subsequent encounter were pertinent to this visit.    Medical Information   Related to Industrial Injury? Yes    Subjective Complaints:  DOI 4/20/2021: 46-year-old male who reportedly fell 12 feet from a scaffolding onto concrete below. He struck the back of his head and left chest wall. See prior note and hospital discharge for detailed history.  Patient states overall he has had a little bit of improvement but has noted some other symptoms.  He states that he continues to have left anterior to lateral chest wall tenderness and pain but this is somewhat improved.  He notes some increased shoulder pain over the last 2 weeks on the left side.  He had noted difficulty with left shoulder range of motion before but thought that would improve but still has trouble lifting his shoulder above shoulder level.  Patient states that he also has some increasing low back pain on the left side and appears to have some swelling on the left flank.  He states that he has pain over the posterior school as well.  He states has been taking tramadol twice daily but states he thinks he can stop taking it at this point.  He was taking some ibuprofen  Tylenol as well before.  He does note another symptom he has been having his dizziness when going from laying to standing or seated to standing.  This occurs more frequently in the morning.  He also notes he has an appointment with the neurologist in 2 weeks.   Objective Findings: Neuro: Alert and oriented x3.  Cranial nerves grossly intact.  Head: Area of pain diffusely over the posterior scalp little bit more so on the left side.  Wound well healed.  Cervical: No gross deformity.  Diffuse tenderness of the lower bilateral paraspinal musculature.  Full range of motion.  Left shoulder: No gross deformity.  Tenderness over the anterior GH and lateral shoulder.  Range of motion diminished to less than about 120 degrees flexion/abduction.  Chest wall: Moderate tenderness over the left lateral and anterior chest wall.  Good chest wall movement.  Lumbar: No gross deformity.  Tenderness diffusely over the left lower paraspinal musculature to the left hip.  Range of motion slight diminished due to pain.    XR Left Shoulder: No acute deformity as read by me   Pre-Existing Condition(s):     Assessment:   Condition Same    Status: Additional Care Required  Permanent Disability:No    Plan:      Diagnostics:      Comments:  Referral for MRI left shoulder  Prescribed tizanidine to use at night  Prescribed ibuprofen 800 mg 3 times daily  May use heat/ice as needed  May use OTC muscle creams/ointments as needed  TTD  Follow-up 3 weeks    Disability Information   Status: Temporarily Totally Disabled    From:  5/14/2021  Through: 6/4/2021 Restrictions are: Temporary   Physical Restrictions   Sitting:    Standing:    Stooping:    Bending:      Squatting:    Walking:    Climbing:    Pushing:      Pulling:    Other:    Reaching Above Shoulder (L):   Reaching Above Shoulder (R):       Reaching Below Shoulder (L):    Reaching Below Shoulder (R):      Not to exceed Weight Limits   Carrying(hrs):   Weight Limit(lb):   Lifting(hrs):    Weight  Limit(lb):     Comments:      Repetitive Actions   Hands: i.e. Fine Manipulations from Grasping:     Feet: i.e. Operating Foot Controls:     Driving / Operate Machinery:     Provider Name:   Сергей Sterling D.O. Physician Signature:  Physician Name:     Clinic Name / Location: [address],   [address]  [place] [address] Clinic Phone Number: Dept: [phone]   Appointment Time: 3:15 Pm Visit Start Time: 3:07 PM   Check-In Time:  3:04 Pm Visit Discharge Time:  4:06pm   Original-Treating Physician or Chiropractor    Page 2-Insurer/TPA    Page 3-Employer    Page 4-Employee

## 2021-06-04 ENCOUNTER — OCCUPATIONAL MEDICINE (OUTPATIENT)
Dept: OCCUPATIONAL MEDICINE | Facility: CLINIC | Age: 46
End: 2021-06-04
Payer: COMMERCIAL

## 2021-06-04 VITALS
DIASTOLIC BLOOD PRESSURE: 74 MMHG | OXYGEN SATURATION: 97 % | WEIGHT: 215 LBS | SYSTOLIC BLOOD PRESSURE: 126 MMHG | TEMPERATURE: 98.3 F | BODY MASS INDEX: 30.85 KG/M2 | HEART RATE: 98 BPM

## 2021-06-04 DIAGNOSIS — S46.912D STRAIN OF LEFT SHOULDER, SUBSEQUENT ENCOUNTER: ICD-10-CM

## 2021-06-04 DIAGNOSIS — S39.012D STRAIN OF LUMBAR REGION, SUBSEQUENT ENCOUNTER: ICD-10-CM

## 2021-06-04 DIAGNOSIS — S22.42XD CLOSED FRACTURE OF MULTIPLE RIBS OF LEFT SIDE WITH ROUTINE HEALING, SUBSEQUENT ENCOUNTER: ICD-10-CM

## 2021-06-04 DIAGNOSIS — F07.81 POST CONCUSSIVE SYNDROME: ICD-10-CM

## 2021-06-04 DIAGNOSIS — S02.91XD CLOSED FRACTURE OF SKULL WITH ROUTINE HEALING, UNSPECIFIED BONE, SUBSEQUENT ENCOUNTER: ICD-10-CM

## 2021-06-04 DIAGNOSIS — S01.01XD LACERATION OF SCALP, SUBSEQUENT ENCOUNTER: ICD-10-CM

## 2021-06-04 PROCEDURE — 99213 OFFICE O/P EST LOW 20 MIN: CPT | Performed by: PREVENTIVE MEDICINE

## 2021-06-04 ASSESSMENT — ENCOUNTER SYMPTOMS
WHEEZING: 0
TINGLING: 0
CHILLS: 0
COUGH: 0
HEADACHES: 1
FEVER: 0
SENSORY CHANGE: 0
DIZZINESS: 1
DOUBLE VISION: 0
BLURRED VISION: 0
PHOTOPHOBIA: 0
HEMOPTYSIS: 0

## 2021-06-04 ASSESSMENT — FIBROSIS 4 INDEX: FIB4 SCORE: 1.47

## 2021-06-04 ASSESSMENT — PAIN SCALES - GENERAL: PAINLEVEL: 5=MODERATE PAIN

## 2021-06-04 NOTE — LETTER
[address],   [address] [place] [address]  Phone:  [phone] - Fax:  [phone]   Occupational Health [facility] Progress Report and Disability Certification  Date of Service: 6/4/2021   No Show:  No  Date / Time of Next Visit: 7/2/21 @ 3:15pm   Claim Information   Patient Name: Nadeem Araujo  Claim Number:     Employer: [email or web address]  Date of Injury: 4/20/2021     Insurer / TPA: Marga Assigned Risk  ID / SSN:     Occupation: [employer]  Diagnosis: Diagnoses of Closed fracture of skull with routine healing, unspecified bone, subsequent encounter, Closed fracture of multiple ribs of left side with routine healing, subsequent encounter, Strain of left shoulder, subsequent encounter, Strain of lumbar region, subsequent encounter, Laceration of scalp, subsequent encounter, and Post concussive syndrome were pertinent to this visit.    Medical Information   Related to Industrial Injury? Yes    Subjective Complaints:  DOI 4/20/2021: 46-year-old male who reportedly fell 12 feet from a scaffolding onto concrete below. He struck the back of his head and left chest wall. See prior note and hospital discharge for detailed history.  Patient states that overall symptoms are overall about the same.  He states he continues to have significant pain in the left ribs, is not as constant as before but it hurts frequently throughout the day.  Does hurt with any bending, coughing, laughing.  He states he continues with left shoulder pain sometimes not as bad as before but other times will worsen.  Still has limitations with movements above shoulder level.  He states he did receive it message from the work comp insurance to schedule an MRI.  He states he saw neurology/neurosurgery for the skull fracture who stated that everything looked good to them.  He does note some continued dizziness especially going from seated to standing and describes it as the  room spinning.  Patient notes continued lower back and left hip pain.  Pain is does not radiate.  Denies numbness or tingling.  Worse with flexion and with prolonged walking.  Patient does note some gastritis symptoms with taking ibuprofen.   Objective Findings: Neuro: Alert and oriented x3.  Cranial nerves grossly intact.  Head: Area of pain diffusely over the posterior scalp little bit more so on the left side.  Wound well healed.  Cervical: No gross deformity.  Diffuse tenderness of the lower bilateral paraspinal musculature.  Full range of motion.  Left shoulder: No gross deformity.  Tenderness over the anterior GH and lateral shoulder.  Range of motion diminished to less than about 120 degrees flexion/abduction.  Chest wall: Moderate tenderness over the left lateral and anterior chest wall.  Good chest wall movement.  Lumbar: No gross deformity.  Tenderness diffusely over the left lower paraspinal musculature to the left hip.  Range of motion slight diminished due to pain.   Pre-Existing Condition(s):     Assessment:   Condition Same    Status: Additional Care Required  Permanent Disability:No    Plan:      Diagnostics:      Comments:  Schedule MRI left shoulder at earliest convenience  Referral for physical therapy and vestibular therapy  Recommend OTC Tylenol 1000 mg 3 times daily as needed for pain  TTD  Follow-up 3 weeks, may consider released to light duty at next visit    Disability Information   Status: Temporarily Totally Disabled    From:  6/4/2021  Through: 7/2/21 Restrictions are: Temporary   Physical Restrictions   Sitting:    Standing:    Stooping:    Bending:      Squatting:    Walking:    Climbing:    Pushing:      Pulling:    Other:    Reaching Above Shoulder (L):   Reaching Above Shoulder (R):       Reaching Below Shoulder (L):    Reaching Below Shoulder (R):      Not to exceed Weight Limits   Carrying(hrs):   Weight Limit(lb):   Lifting(hrs):   Weight  Limit(lb):     Comments:      Repetitive  Actions   Hands: i.e. Fine Manipulations from Grasping:     Feet: i.e. Operating Foot Controls:     Driving / Operate Machinery:     Provider Name:   Сергей Sterling D.O. Physician Signature:  Physician Name:     Clinic Name / Location: [address],   [address]  [place] [address] Clinic Phone Number: Dept: [phone]   Appointment Time: 3:15 Pm Visit Start Time: 3:21 PM   Check-In Time:  2:54 Pm Visit Discharge Time:  3:50pm   Original-Treating Physician or Chiropractor    Page 2-Insurer/TPA    Page 3-Employer    Page 4-Employee

## 2021-06-04 NOTE — PROGRESS NOTES
Subjective:     Nadeem Araujo is a 46 y.o. male who presents for Head Injury (WC DOI: 4/20/21 Head, ribs same - RM 16)      DOI 4/20/2021: 46-year-old male who reportedly fell 12 feet from a scaffolding onto concrete below. He struck the back of his head and left chest wall. See prior note and hospital discharge for detailed history.  Patient states that overall symptoms are overall about the same.  He states he continues to have significant pain in the left ribs, is not as constant as before but it hurts frequently throughout the day.  Does hurt with any bending, coughing, laughing.  He states he continues with left shoulder pain sometimes not as bad as before but other times will worsen.  Still has limitations with movements above shoulder level.  He states he did receive it message from the [employer] comp insurance to schedule an MRI.  He states he saw neurology/neurosurgery for the skull fracture who stated that everything looked good to them.  He does note some continued dizziness especially going from seated to standing and describes it as the room spinning.  Patient notes continued lower back and left hip pain.  Pain is does not radiate.  Denies numbness or tingling.  Worse with flexion and with prolonged walking.  Patient does note some gastritis symptoms with taking ibuprofen.    Review of Systems   Constitutional: Negative for chills and fever.   HENT: Negative for hearing loss.    Eyes: Negative for blurred vision, double vision and photophobia.   Respiratory: Negative for cough, hemoptysis and wheezing.    Neurological: Positive for dizziness and headaches. Negative for tingling and sensory change.       SOCHX: Works as a daniel at [employer]  FH: No pertinent family history to this problem.       Objective:     /74   Pulse 98   Temp 36.8 °C (98.3 °F)   Wt 97.5 kg (215 lb)   SpO2 97%   BMI 30.85 kg/m²     Constitutional: Patient is in no acute distress. Appears  well-developed and well-nourished.   Cardiovascular: Normal rate.    Pulmonary/Chest: Effort normal. No respiratory distress.   Neurological: Patient is alert and oriented to person, place, and time.   Skin: Skin is warm and dry.   Psychiatric: Normal mood and affect. Behavior is normal.     Neuro: Alert and oriented x3.  Cranial nerves grossly intact.  Head: Area of pain diffusely over the posterior scalp little bit more so on the left side.  Wound well healed.  Cervical: No gross deformity.  Diffuse tenderness of the lower bilateral paraspinal musculature.  Full range of motion.  Left shoulder: No gross deformity.  Tenderness over the anterior GH and lateral shoulder.  Range of motion diminished to less than about 120 degrees flexion/abduction.  Chest wall: Moderate tenderness over the left lateral and anterior chest wall.  Good chest wall movement.  Lumbar: No gross deformity.  Tenderness diffusely over the left lower paraspinal musculature to the left hip.  Range of motion slight diminished due to pain.    Assessment/Plan:       1. Closed fracture of skull with routine healing, unspecified bone, subsequent encounter    2. Closed fracture of multiple ribs of left side with routine healing, subsequent encounter  - REFERRAL TO PHYSICAL THERAPY    3. Strain of left shoulder, subsequent encounter  - REFERRAL TO PHYSICAL THERAPY    4. Strain of lumbar region, subsequent encounter  - REFERRAL TO PHYSICAL THERAPY    5. Laceration of scalp, subsequent encounter    6. Post concussive syndrome  - REFERRAL TO PHYSICAL THERAPY    Temporarily Totally Disabled FROM 6/4/2021 TO 6/4/2021       Schedule MRI left shoulder at earliest convenience  Referral for physical therapy and vestibular therapy  Recommend OTC Tylenol 1000 mg 3 times daily as needed for pain  TTD  Follow-up 3 weeks, may consider released to light duty at next visit    Differential diagnosis, natural history, supportive care, and indications for immediate  follow-up discussed.    Approximately 25 minutes was spent in preparing for visit, obtaining history, exam and evaluation, patient counseling/education and post visit documentation/orders.

## 2021-06-05 DIAGNOSIS — S46.912D STRAIN OF LEFT SHOULDER, SUBSEQUENT ENCOUNTER: ICD-10-CM

## 2021-06-05 DIAGNOSIS — S22.42XD CLOSED FRACTURE OF MULTIPLE RIBS OF LEFT SIDE WITH ROUTINE HEALING, SUBSEQUENT ENCOUNTER: ICD-10-CM

## 2021-06-05 DIAGNOSIS — S02.91XD CLOSED FRACTURE OF SKULL WITH ROUTINE HEALING, UNSPECIFIED BONE, SUBSEQUENT ENCOUNTER: ICD-10-CM

## 2021-06-05 DIAGNOSIS — S39.012D STRAIN OF LUMBAR REGION, SUBSEQUENT ENCOUNTER: ICD-10-CM

## 2021-06-09 RX ORDER — IBUPROFEN 800 MG/1
800 TABLET ORAL EVERY 8 HOURS PRN
Qty: 90 TABLET | Refills: 0 | Status: SHIPPED | OUTPATIENT
Start: 2021-06-09 | End: 2021-12-22

## 2021-07-02 ENCOUNTER — OCCUPATIONAL MEDICINE (OUTPATIENT)
Dept: OCCUPATIONAL MEDICINE | Facility: CLINIC | Age: 46
End: 2021-07-02
Payer: COMMERCIAL

## 2021-07-02 VITALS
HEART RATE: 106 BPM | HEIGHT: 70 IN | WEIGHT: 220 LBS | SYSTOLIC BLOOD PRESSURE: 124 MMHG | BODY MASS INDEX: 31.5 KG/M2 | OXYGEN SATURATION: 95 % | DIASTOLIC BLOOD PRESSURE: 68 MMHG | TEMPERATURE: 99.3 F

## 2021-07-02 DIAGNOSIS — S39.012D STRAIN OF LUMBAR REGION, SUBSEQUENT ENCOUNTER: ICD-10-CM

## 2021-07-02 DIAGNOSIS — S02.91XD CLOSED FRACTURE OF SKULL WITH ROUTINE HEALING, UNSPECIFIED BONE, SUBSEQUENT ENCOUNTER: ICD-10-CM

## 2021-07-02 DIAGNOSIS — S46.912D STRAIN OF LEFT SHOULDER, SUBSEQUENT ENCOUNTER: ICD-10-CM

## 2021-07-02 DIAGNOSIS — S22.42XD CLOSED FRACTURE OF MULTIPLE RIBS OF LEFT SIDE WITH ROUTINE HEALING, SUBSEQUENT ENCOUNTER: ICD-10-CM

## 2021-07-02 DIAGNOSIS — F07.81 POST CONCUSSIVE SYNDROME: ICD-10-CM

## 2021-07-02 PROCEDURE — 99213 OFFICE O/P EST LOW 20 MIN: CPT | Performed by: PREVENTIVE MEDICINE

## 2021-07-02 ASSESSMENT — ENCOUNTER SYMPTOMS
TINGLING: 1
DIZZINESS: 1
HEADACHES: 1

## 2021-07-02 ASSESSMENT — FIBROSIS 4 INDEX: FIB4 SCORE: 1.47

## 2021-07-02 NOTE — LETTER
[address],   [address] [place] [address]  Phone:  [phone] - Fax:  [phone]   Occupational Health [facility] Progress Report and Disability Certification  Date of Service: 7/2/2021   No Show:  No  Date / Time of Next Visit: 7/8/2021 @ 2:45pm   Claim Information   Patient Name: Nadeem Araujo  Claim Number:     Employer: [email or web address]  Date of Injury: 4/20/2021     Insurer / TPA: Marga Assigned Risk  ID / SSN:     Occupation: [employer]  Diagnosis: Diagnoses of Closed fracture of skull with routine healing, unspecified bone, subsequent encounter, Closed fracture of multiple ribs of left side with routine healing, subsequent encounter, Strain of left shoulder, subsequent encounter, Strain of lumbar region, subsequent encounter, and Post concussive syndrome were pertinent to this visit.    Medical Information   Related to Industrial Injury? Yes    Subjective Complaints:  DOI 4/20/2021: 46-year-old male who reportedly fell 12 feet from a scaffolding onto concrete below. He struck the back of his head and left chest wall. See prior note and hospital discharge for detailed history.  Patient states he has had some improvement left rib pain.  Shoulder pain is about the same.  He notes that he does continue to have head pain at the site of the fracture as well as dizziness.  His vestibular and physical therapy were approved but he has yet to schedule it.  Continues with some mild low back pain.  He states he is taking ibuprofen for relief which helps somewhat but he does have a history of gastritis and so does not take too much. States he has MRI scheduled for tomorrow.   Objective Findings: Neuro: Alert and oriented x3.  Cranial nerves grossly intact.  Head: Tenderness diffusely over the posterior scalp.  Wound well healed.  Cervical: No gross deformity.  Diffuse tenderness of the lower bilateral paraspinal musculature.  Full range of  motion.  Left shoulder: No gross deformity.  Tenderness over the anterior GH and lateral shoulder.  Range of motion diminished to less than about 120 degrees flexion/abduction.  Chest wall: Area of pain over the left lateral and anterior chest wall.  Good chest wall movement.  Lumbar: No gross deformity.  Tenderness diffusely over the left lower paraspinal musculature to the left hip.  Range of motion mildly diminished with flexion.   Pre-Existing Condition(s):     Assessment:   Condition Same    Status: Additional Care Required  Permanent Disability:No    Plan:      Diagnostics:      Comments:  Schedule MRI left shoulder scheduled for tomorrow.  Referral for physical therapy and vestibular therapy, approved provided info to schedule  Recommend OTC Tylenol 1000 mg 3 times daily as needed for pain  TTD  Follow-up one week    Disability Information   Status: Temporarily Totally Disabled    From:  7/2/2021  Through: 7/8/2021 Restrictions are: Temporary   Physical Restrictions   Sitting:    Standing:    Stooping:    Bending:      Squatting:    Walking:    Climbing:    Pushing:      Pulling:    Other:    Reaching Above Shoulder (L):   Reaching Above Shoulder (R):       Reaching Below Shoulder (L):    Reaching Below Shoulder (R):      Not to exceed Weight Limits   Carrying(hrs):   Weight Limit(lb):   Lifting(hrs):   Weight  Limit(lb):     Comments:      Repetitive Actions   Hands: i.e. Fine Manipulations from Grasping:     Feet: i.e. Operating Foot Controls:     Driving / Operate Machinery:     Provider Name:   Сергей Sterling D.O. Physician Signature:  Physician Name:     Clinic Name / Location: [address],   [address] [place] [address] Clinic Phone Number: Dept: [phone]   Appointment Time: 3:15 Pm Visit Start Time: 2:50 PM   Check-In Time:  2:48 Pm Visit Discharge Time:  3:25pm   Original-Treating Physician or Chiropractor    Page 2-Insurer/TPA    Page 3-Employer    Page  4-Employee

## 2021-07-02 NOTE — PROGRESS NOTES
"Subjective:     Nadeem Araujo is a 46 y.o. male who presents for Other (WC DOI 4/20/21 head injury, feeling better room 16)      DOI 4/20/2021: 46-year-old male who reportedly fell 12 feet from a scaffolding onto concrete below. He struck the back of his head and left chest wall. See prior note and hospital discharge for detailed history.  Patient states he has had some improvement left rib pain.  Shoulder pain is about the same.  He notes that he does continue to have head pain at the site of the fracture as well as dizziness.  His vestibular and physical therapy were approved but he has yet to schedule it.  Continues with some mild low back pain.  He states he is taking ibuprofen for relief which helps somewhat but he does have a history of gastritis and so does not take too much. States he has MRI scheduled for tomorrow.    Review of Systems   Neurological: Positive for dizziness, tingling and headaches.          Objective:     /68   Pulse (!) 106   Temp 37.4 °C (99.3 °F)   Ht 1.778 m (5' 10\")   Wt 99.8 kg (220 lb)   SpO2 95%   BMI 31.57 kg/m²     Constitutional: Patient is in no acute distress. Appears well-developed and well-nourished.   Cardiovascular: Normal rate.    Pulmonary/Chest: Effort normal. No respiratory distress.   Neurological: Patient is alert and oriented to person, place, and time.   Skin: Skin is warm and dry.   Psychiatric: Normal mood and affect. Behavior is normal.     Neuro: Alert and oriented x3.  Cranial nerves grossly intact.  Head: Tenderness diffusely over the posterior scalp.  Wound well healed.  Cervical: No gross deformity.  Diffuse tenderness of the lower bilateral paraspinal musculature.  Full range of motion.  Left shoulder: No gross deformity.  Tenderness over the anterior GH and lateral shoulder.  Range of motion diminished to less than about 120 degrees flexion/abduction.  Chest wall: Area of pain over the left lateral and anterior chest wall.  Good chest " wall movement.  Lumbar: No gross deformity.  Tenderness diffusely over the left lower paraspinal musculature to the left hip.  Range of motion mildly diminished with flexion.    Assessment/Plan:       1. Closed fracture of skull with routine healing, unspecified bone, subsequent encounter    2. Closed fracture of multiple ribs of left side with routine healing, subsequent encounter    3. Strain of left shoulder, subsequent encounter    4. Strain of lumbar region, subsequent encounter    5. Post concussive syndrome    Temporarily Totally Disabled FROM 7/2/2021 TO 7/8/2021       Schedule MRI left shoulder scheduled for tomorrow.  Referral for physical therapy and vestibular therapy, approved provided info to schedule  Recommend OTC Tylenol 1000 mg 3 times daily as needed for pain  TTD  Follow-up one week    Differential diagnosis, natural history, supportive care, and indications for immediate follow-up discussed.    Approximately 25 minutes was spent in preparing for visit, obtaining history, exam and evaluation, patient counseling/education and post visit documentation/orders.

## 2021-07-07 ENCOUNTER — PHYSICAL THERAPY (OUTPATIENT)
Dept: PHYSICAL THERAPY | Facility: REHABILITATION | Age: 46
End: 2021-07-07
Attending: PREVENTIVE MEDICINE
Payer: COMMERCIAL

## 2021-07-07 DIAGNOSIS — S22.42XD CLOSED FRACTURE OF MULTIPLE RIBS OF LEFT SIDE WITH ROUTINE HEALING, SUBSEQUENT ENCOUNTER: ICD-10-CM

## 2021-07-07 DIAGNOSIS — S46.912D STRAIN OF LEFT SHOULDER, SUBSEQUENT ENCOUNTER: ICD-10-CM

## 2021-07-07 DIAGNOSIS — S39.012D STRAIN OF LUMBAR REGION, SUBSEQUENT ENCOUNTER: ICD-10-CM

## 2021-07-07 DIAGNOSIS — F07.81 POST CONCUSSIVE SYNDROME: ICD-10-CM

## 2021-07-07 PROCEDURE — 95992 CANALITH REPOSITIONING PROC: CPT

## 2021-07-07 PROCEDURE — 97162 PT EVAL MOD COMPLEX 30 MIN: CPT

## 2021-07-07 SDOH — ECONOMIC STABILITY: GENERAL: QUALITY OF LIFE: GOOD

## 2021-07-07 ASSESSMENT — ENCOUNTER SYMPTOMS
PAIN SCALE AT LOWEST: 0
PAIN LOCATION: L RIBS
EXACERBATED BY: SLEEPING
PAIN TIMING: WHEN ACTIVE
QUALITY: DISCOMFORT
ALLEVIATING FACTORS: ACTIVITY MODIFICATION
EXACERBATED BY: RAPID POSITION CHANGES
EXACERBATED BY: LIFTING
ALLEVIATING FACTORS: RELAXATION
ALLEVIATING FACTORS: PAIN MEDICATION
PAIN SCALE: 3
QUALITY: ACHING
PAIN SCALE AT HIGHEST: 5
EXACERBATED BY: CARRYING
QUALITY: DULL ACHE
EXACERBATED BY: MOVEMENT
EXACERBATED BY: IMMOBILITY
PAIN TIMING: INTERMITTENT
ALLEVIATING FACTORS: POSITION CHANGE
PAIN TIMING: EVERY MORNING
ALLEVIATING FACTORS: REST

## 2021-07-07 NOTE — OP THERAPY EVALUATION
"  Outpatient Physical Therapy  INITIAL EVALUATION    [facility] Therapy [address]  [address]  [place] [address]  Phone:  [phone]  Fax:  [phone]    Date of Evaluation: 07/07/2021    Patient: Nadeem Araujo  YOB: 1975  MRN: [id number]     Referring Provider: Сергей Sterling D.O.  [address] [facility] [address]  [place] [address]   Referring Diagnosis Closed fracture of multiple ribs of left side with routine healing, subsequent encounter [S22.42XD];Strain of left shoulder, subsequent encounter [S46.912D];Strain of lumbar region, subsequent encounter [S39.012D];Post concussive syndrome [F07.81]     Time Calculation  Start time: 0900  Stop time: 0938 Time Calculation (min): 38 minutes         Chief Complaint: Work-Related Injury and Shoulder Problem    Visit Diagnoses     ICD-10-CM   1. Closed fracture of multiple ribs of left side with routine healing, subsequent encounter  S22.42XD   2. Strain of left shoulder, subsequent encounter  S46.912D   3. Strain of lumbar region, subsequent encounter  S39.012D   4. Post concussive syndrome  F07.81       Date of onset of impairment: 4/20/2021    Subjective:   History of Present Illness:     Date of onset:  4/20/2021    Mechanism of injury:  Per pt's occupational medicine visit on 7/2/21, \"46-year-old male who reportedly fell 12 feet from a scaffolding onto concrete below. He struck the back of his head and left chest wall. See prior note and hospital discharge for detailed history.  Patient states he has had some improvement left rib pain.  Shoulder pain is about the same.  He notes that he does continue to have head pain at the site of the fracture as well as dizziness.  His vestibular and physical therapy were approved but he has yet to schedule it.  Continues with some mild low back pain.  He states he is taking ibuprofen for relief which helps somewhat but he does have a history of gastritis and so does not take too much. " "States he has MRI scheduled for tomorrow.\"    The pt presents to physical therapist initial evaluation today reporting ongoing pain in his ribs and L shoulder. He states that he still gets dizziness when he wakes in the morning. Some dizziness with prolonged walking, states that it feels like the room is spinning. States that the dizziness lasts about 10-20 seconds. Some dizziness with looking upwards. Pt has not experienced any falls secondary to the dizziness. Pt is not sure when he will be returning to work. The pt has some pain in the ribs with turning in bed, sleeps with occasional pain. In order to return to work the pt would have to climb ladders, carry up to 50+ pounds. The pt is able to do IADLs but is more cautious. L shoulder pain 3/10 current, 1/10 pain at best, 5/10 at worst  Quality of life:  Good  Prior level of function:  Pt was previously independent with all IADLs, recreational tasks, and work activities  Headaches:  tension headaches  Ear problems: none  Sleep disturbance:  Interrupted sleep  Pain:     Current pain rating:  3    At best pain ratin    At worst pain ratin    Location:  L ribs    Quality:  Aching, discomfort and dull ache    Pain timing:  Intermittent, when active and every morning    Relieving factors:  Activity modification, rest, pain medication, relaxation and position change    Aggravating factors:  Carrying, immobility, lifting, movement, rapid position changes and sleeping    Progression:  Improving  Social Support:     Lives in:  Multiple-level home    Lives with: Friend.  Hand dominance:  Right  Treatments:     Previous treatment:  Medication    Current treatment:  Rest and medication  Patient Goals:     Patient goals for therapy:  Decreased pain, increased motion, increased strength and return to work      Past Medical History:   Diagnosis Date   • Depression    • GERD (gastroesophageal reflux disease)    • Hypertension      Past Surgical History:   Procedure " Laterality Date   • PTERYGIUM EXCISION Left 9/24/2019    Procedure: EXCISION, PTERYGIUM - WITH AMNIO GRAFT;  Surgeon: Jose Singh M.D.;  Location: SURGERY SAME DAY [facility];  Service: Ophthalmology   • PTERYGIUM EXCISION Right 4/23/2019    Procedure: EXCISION, PTERYGIUM WITH CONJUCTIVAL OUTOGRAFT;  Surgeon: Jose Singh M.D.;  Location: SURGERY SAME DAY [facility];  Service: Ophthalmology     Social History     Tobacco Use   • Smoking status: Never Smoker   • Smokeless tobacco: Never Used   Substance Use Topics   • Alcohol use: Yes     Comment: occasionally     Family and Occupational History     Socioeconomic History   • Marital status:      Spouse name: Not on file   • Number of children: 1   • Years of education: Not on file   • Highest education level: Not on file   Occupational History   • Not on file       Objective     Observations   Central spine     Positive for forward head/neck and rounded shoulders.    Postural Observations  Seated posture: fair  Standing posture: fair        Cervical Screen    Cervical range of motion within normal limits with the following exceptions: Flexion 45  Extension 20, mild dizziness  Side-bending 20 deg bilat, slight L sided pain with R side-bend  Rotation L 45, R 50, slight L shoulder discomfort    Neurological Testing     Myotome testing   Cervical (left)   All left cervical myotomes within normal limits    Cervical (right)   All right cervical myotomes within normal limits    Dermatome testing   Cervical (left)   All left cervical dermatomes intact    Cervical (right)   All right cervical dermatomes intact    Active Range of Motion   Left Shoulder   Flexion: 150 degrees   Abduction: 130 degrees   External rotation BTH: T2   Internal rotation BTB: T12 (Pain)     Right Shoulder   Normal active range of motion    Strength:      Left Shoulder   Planes of Motion   Flexion: 4   Abduction: 4   External rotation at 90°: 4+   Internal rotation at 45°: 4+      Right Shoulder   Normal muscle strength    Tests     Left Shoulder   Positive belly press, Hawkin's, Neer's and painful arc.   Negative drop arm, empty can and full can.     General Comments     Spine Comments   Visual tracking WNL in all planes, no nystagmus  Saccades WNL  Head thrust test: Negative bilaterally  (+) L freddy hallpike, Epley completed 1x  (-) R freddy hallpike        Therapeutic Treatments and Modalities:     1. Canalith Repositioning (CPT 35432), Epley maneuver completed 1x following (+) L Freddy-Hallpike test    Time-based treatments/modalities:           Assessment, Response and Plan:   Impairments: abnormal or restricted ROM, activity intolerance, difficulty performing job, impaired functional mobility, impaired physical strength, lacks appropriate home exercise program and pain with function    Assessment details:  Nadeem Araujo is a 46 y.o. male who presents to skilled physical therapist initial evaluation with current complaints of dizziness, L shoulder, and L rib pain secondary to a work related incident on 4/20/21, detailed above. The pt presents with objective impairments in L shoulder strength, L shoulder ROM, positive impingement special tests, abnormal posture, positive L Freddy-Hallpike, and decreased tolerance to desired functional and work related tasks. The impairments found during today's evaluation can be addressed by PT intervention and the pt appears motivated to participate in PT in order to improve his CLOF. It is anticipated that the pt will benefit from skilled physical therapy intervention to address functional limitations and impairments detailed above, to address his current limited tolerance to work activities, and to facilitate his return to PLOF.   Barriers to therapy:  None  Prognosis: good    Goals:   Short Term Goals:   1. The pt will reports 80% improvement in his dizziness when waking in the morning  2. Pt will demonstrate L shoulder flexion AROM to 160 degrees to  allow improved overhead reaching  3. Pt will be able to sleep throughout the night without being awoken by pain  4. Pt will be able to lift 15# overhead with the L UE  Short term goal time span:  2-4 weeks      Long Term Goals:    1. Pt will be able to lift 50# from the ground with B UEs   2. Pt will be able to climb a ladder with good safety and stability  3. Pt will report self-efficacy in his ability to return to work  4. Pt will be independent with HEP  Long term goal time span:  4-6 weeks    Plan:   Therapy options:  Physical therapy treatment to continue  Planned therapy interventions:  Canalith Repositioning (CPT 70395), Functional Training, Self Care (CPT 00801), Neuromuscular Re-education (CPT 45115), Therapeutic Exercise (CPT 12832) and Therapeutic Activities (CPT 52542)  Frequency:  2x week  Duration in weeks:  4  Duration in visits:  8  Discussed with:  Patient  Plan details:  Pt educated on their current objective clinical presentation, anticipated PT POC, and importance of adherence to prescribed HEP in order to maximize PT benefit.        Functional Assessment Used        Referring provider co-signature:  I have reviewed this plan of care and my co-signature certifies the need for services.    Certification Period: 07/07/2021 to  08/18/21    Physician Signature: ________________________________ Date: ______________

## 2021-07-08 ENCOUNTER — OCCUPATIONAL MEDICINE (OUTPATIENT)
Dept: OCCUPATIONAL MEDICINE | Facility: CLINIC | Age: 46
End: 2021-07-08
Payer: COMMERCIAL

## 2021-07-08 ENCOUNTER — PHYSICAL THERAPY (OUTPATIENT)
Dept: PHYSICAL THERAPY | Facility: REHABILITATION | Age: 46
End: 2021-07-08
Attending: PREVENTIVE MEDICINE
Payer: COMMERCIAL

## 2021-07-08 VITALS
DIASTOLIC BLOOD PRESSURE: 86 MMHG | HEART RATE: 100 BPM | TEMPERATURE: 98.5 F | RESPIRATION RATE: 18 BRPM | SYSTOLIC BLOOD PRESSURE: 144 MMHG | WEIGHT: 220 LBS | OXYGEN SATURATION: 95 % | HEIGHT: 70 IN | BODY MASS INDEX: 31.5 KG/M2

## 2021-07-08 DIAGNOSIS — S02.91XD CLOSED FRACTURE OF SKULL WITH ROUTINE HEALING, UNSPECIFIED BONE, SUBSEQUENT ENCOUNTER: ICD-10-CM

## 2021-07-08 DIAGNOSIS — S46.912D STRAIN OF LEFT SHOULDER, SUBSEQUENT ENCOUNTER: ICD-10-CM

## 2021-07-08 DIAGNOSIS — S43.432A SUPERIOR LABRUM ANTERIOR-TO-POSTERIOR (SLAP) TEAR OF LEFT SHOULDER: ICD-10-CM

## 2021-07-08 DIAGNOSIS — S39.012D STRAIN OF LUMBAR REGION, SUBSEQUENT ENCOUNTER: ICD-10-CM

## 2021-07-08 DIAGNOSIS — S22.42XD CLOSED FRACTURE OF MULTIPLE RIBS OF LEFT SIDE WITH ROUTINE HEALING, SUBSEQUENT ENCOUNTER: ICD-10-CM

## 2021-07-08 DIAGNOSIS — F07.81 POST CONCUSSIVE SYNDROME: ICD-10-CM

## 2021-07-08 PROCEDURE — 97110 THERAPEUTIC EXERCISES: CPT

## 2021-07-08 PROCEDURE — 99213 OFFICE O/P EST LOW 20 MIN: CPT | Performed by: PREVENTIVE MEDICINE

## 2021-07-08 PROCEDURE — 97112 NEUROMUSCULAR REEDUCATION: CPT

## 2021-07-08 RX ORDER — TIZANIDINE 4 MG/1
4 TABLET ORAL
Qty: 20 TABLET | Refills: 0 | Status: SHIPPED | OUTPATIENT
Start: 2021-07-08 | End: 2021-12-22

## 2021-07-08 ASSESSMENT — ENCOUNTER SYMPTOMS
SHORTNESS OF BREATH: 0
FEVER: 0
HEADACHES: 1
DIZZINESS: 1
WHEEZING: 0
CHILLS: 0
COUGH: 0
TINGLING: 0
SENSORY CHANGE: 0

## 2021-07-08 ASSESSMENT — FIBROSIS 4 INDEX: FIB4 SCORE: 1.47

## 2021-07-08 NOTE — OP THERAPY DAILY TREATMENT
"  Outpatient Physical Therapy  DAILY TREATMENT     [facility] Physical Therapy [address].  [address]  [place] [address]  Phone:  [phone]  Fax:  [phone]    Date: 07/08/2021    Patient: Nadeem Araujo  YOB: 1975  MRN: [id number]     Time Calculation    Start time: 0900  Stop time: 0938 Time Calculation (min): 38 minutes         Chief Complaint: Difficulty Walking and Shoulder Problem    Visit #: 2    SUBJECTIVE:  The pt states that he still feels \"about the same\" with reports of mild 'dizziness\" upon waking this morning. He states that his symptoms come and go. He is agreeable to PT intervention today.     OBJECTIVE:  Current objective measures: (-) Freddy-Hallpike on the L, subjective reports of \"dizziness\" but no nystagmus noted          Therapeutic Exercises (CPT 98353):     1. UBE, L4, 2 minutes forward 2 minutes backward    2. Pulleys, 20x     3. Wall flexion AAROM, 20x    4. Wall diagonals AAROM, 10x5\"    5. T-band rows, 20 reps, Black t-band    6. T-band ER/IR , 2x15 reps, Black t-band    7. Foam roll t-band horizontal abduction , 15, Green t-band      Therapeutic Exercise Summary: Pt educated to monitor his dizziness symptoms and to write down the date and time of onset of his symptoms to bring to next visit.     Therapeutic Treatments and Modalities:     1. Neuromuscular Re-education (CPT 29368), See below    Therapeutic Treatment and Modalities Summary: Neuromuscular Re-Education  -MCTSIB testing including 3x30\" of each of the following conditions  Firm surface eyes open  Firm surface eyes closed  Foam surface eyes open  Foam surface eyes closed  Pt performed WNL on all tests besides EC firm surface, where pt performed slightly below average.     Time-based treatments/modalities:    Physical Therapy Timed Treatment Charges  Neuromusc re-ed, balance, coor, post minutes (CPT 71371): 8 minutes  Therapeutic exercise minutes (CPT 90650): 30 minutes    ASSESSMENT: "   Response to treatment: The pt tolerated initiation of shoulder mobility and gentle strengthening exercises with reports of mild discomfort but no significant pain. He has greatest limitations into end range shoulder AROM flexion and abduction. The pt performed well on mCTSIB with all testing conditions within expected limits besides eyes closed firm surface. The pt will continue to benefit from skilled physical therapy intervention to address ongoing limitations in shoulder strength and mobility, to normalize vestibular changes, and to maximize his return to PLOF.     PLAN/RECOMMENDATIONS:   Plan for treatment: therapy treatment to continue next visit.  Planned interventions for next visit: continue with current treatment. Re-check Williams-Hallpike, continue to progress shoulder strengthening and mobility.

## 2021-07-08 NOTE — PROGRESS NOTES
"Subjective:     Nadeem Araujo is a 46 y.o. male who presents for Follow-Up ([facility] DOI 4/20/21 head, same, rm 19)      DOI 4/20/2021: 46-year-old male who reportedly fell 12 feet from a scaffolding onto concrete below. He struck the back of his head and left chest wall. See prior note and hospital discharge for detailed history.  Patient states that overall his left ribs have been improving.  He does get pain with flexion of the ribs.  He states that left lower back pain improved a little bit.  He states the most concerning symptoms he had with the dizziness especially in the morning and the left shoulder pain.  Continues difficulty with range of motion above shoulder level.  However he did have his MRI performed last weekend.  Here mostly for results.    Review of Systems   Constitutional: Negative for chills and fever.   Respiratory: Negative for cough, shortness of breath and wheezing.    Neurological: Positive for dizziness and headaches. Negative for tingling and sensory change.       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       Objective:     /86   Pulse 100   Temp 36.9 °C (98.5 °F)   Resp 18   Ht 1.778 m (5' 10\")   Wt 99.8 kg (220 lb)   SpO2 95%   BMI 31.57 kg/m²     Constitutional: Patient is in no acute distress. Appears well-developed and well-nourished.   Cardiovascular: Normal rate.    Pulmonary/Chest: Effort normal. No respiratory distress.   Neurological: Patient is alert and oriented to person, place, and time.   Skin: Skin is warm and dry.   Psychiatric: Normal mood and affect. Behavior is normal.     Neuro: Alert and oriented x3.  Cranial nerves grossly intact.  Cervical: No gross deformity.  Diffuse tenderness of the left lower cervical paraspinal musculature.  Full range of motion.  Left shoulder: No gross deformity.  Tenderness over the anterior GH and lateral shoulder.  Range of motion diminished to less than about " 120 degrees flexion/abduction, with pain.  Chest wall: Area of pain over the left lateral and anterior chest wall.  Good chest wall movement.  Lumbar: No gross deformity.  Area of pain indicated over the left lower paraspinal musculature.      MRI left shoulder: SLAP tear involving the biceps labral anchor.  Mild supraspinatus tendinosis.    Assessment/Plan:       1. Closed fracture of skull with routine healing, unspecified bone, subsequent encounter    2. Closed fracture of multiple ribs of left side with routine healing, subsequent encounter  - tizanidine (ZANAFLEX) 4 MG Tab; Take 1 tablet by mouth at bedtime as needed.  Dispense: 20 tablet; Refill: 0    3. Strain of left shoulder, subsequent encounter  - REFERRAL TO ORTHOPEDICS  - tizanidine (ZANAFLEX) 4 MG Tab; Take 1 tablet by mouth at bedtime as needed.  Dispense: 20 tablet; Refill: 0    4. Strain of lumbar region, subsequent encounter  - tizanidine (ZANAFLEX) 4 MG Tab; Take 1 tablet by mouth at bedtime as needed.  Dispense: 20 tablet; Refill: 0    5. Post concussive syndrome    6. Superior labrum anterior-to-posterior (SLAP) tear of left shoulder  - REFERRAL TO ORTHOPEDICS    Released to Restricted Duty FROM 7/8/2021 TO 8/5/2021  Office/Sedentary work only    Given MRI findings referral to orthopedics for further evaluation  Continue physical/vestibular therapy  Recommend OTC Tylenol 1000 mg 3 times daily as needed for pain  Refill tizanidine to use at night as needed  We will recommend restricted duty at   this time, office/sedentary work only  Follow-up 4 weeks    Differential diagnosis, natural history, supportive care, and indications for immediate follow-up discussed.    Approximately 25 minutes was spent in preparing for visit, obtaining history, exam and evaluation, patient counseling/education and post visit documentation/orders.

## 2021-07-08 NOTE — LETTER
[address],   [address] [place] [address]  Phone:  [phone] - Fax:  [phone]   Occupational Health [facility] Progress Report and Disability Certification  Date of Service: 7/8/2021   No Show:  No  Date / Time of Next Visit: 8/5/2021 @ 2:45pm   Claim Information   Patient Name: Nadeem Araujo  Claim Number:     Employer: [employer] \ Date of Injury: 4/20/2021     Insurer / TPA: Marga Assigned Risk  ID / SSN:     Occupation: [employer]  Diagnosis: Diagnoses of Closed fracture of skull with routine healing, unspecified bone, subsequent encounter, Closed fracture of multiple ribs of left side with routine healing, subsequent encounter, Strain of left shoulder, subsequent encounter, Strain of lumbar region, subsequent encounter, Post concussive syndrome, and Superior labrum anterior-to-posterior (SLAP) tear of left shoulder were pertinent to this visit.    Medical Information   Related to Industrial Injury? Yes    Subjective Complaints:  DOI 4/20/2021: 46-year-old male who reportedly fell 12 feet from a scaffolding onto concrete below. He struck the back of his head and left chest wall. See prior note and hospital discharge for detailed history.  Patient states that overall his left ribs have been improving.  He does get pain with flexion of the ribs.  He states that left lower back pain improved a little bit.  He states the most concerning symptoms he had with the dizziness especially in the morning and the left shoulder pain.  Continues difficulty with range of motion above shoulder level.  However he did have his MRI performed last weekend.  Here mostly for results.   Objective Findings: Neuro: Alert and oriented x3.  Cranial nerves grossly intact.  Cervical: No gross deformity.  Diffuse tenderness of the left lower cervical paraspinal musculature.  Full range of motion.  Left shoulder: No gross deformity.  Tenderness over the  anterior GH and lateral shoulder.  Range of motion diminished to less than about 120 degrees flexion/abduction, with pain.  Chest wall: Area of pain over the left lateral and anterior chest wall.  Good chest wall movement.  Lumbar: No gross deformity.  Area of pain indicated over the left lower paraspinal musculature.      MRI left shoulder: SLAP tear involving the biceps labral anchor.  Mild supraspinatus tendinosis.   Pre-Existing Condition(s):     Assessment:   Condition Same    Status: Additional Care Required  Permanent Disability:No    Plan:      Diagnostics:      Comments:  Given MRI findings referral to orthopedics for further evaluation  Continue physical/vestibular therapy  Recommend OTC Tylenol 1000 mg 3 times daily as needed for pain  Refill tizanidine to use at night as needed  We will recommend restricted duty at   this time, office/sedentary work only  Follow-up 4 weeks    Disability Information   Status: Released to Restricted Duty    From:  2021  Through: 2021 Restrictions are: Temporary   Physical Restrictions   Sitting:    Standing:  < or = to 2 hrs/day Stoopin hrs/day Bend[place] hrs/day   Squatting:    Walking:  < or = to 2 hrs/day Climbin hrs/day Pushing:  < or = to 1 hr/day   Pulling:  < or = to 1 hr/day Other:    Reaching Above Shoulder (L): < or = to 1 hr/day Reaching Above Shoulder (R):       Reaching Below Shoulder (L):    Reaching Below Shoulder (R):      Not to exceed Weight Limits   Carrying(hrs):   Weight Limit(lb): < or = to 10 pounds Lifting(hrs):   Weight  Limit(lb): < or = to 10 pounds   Comments: Office/Sedentary work only    Repetitive Actions   Hands: i.e. Fine Manipulations from Grasping:     Feet: i.e. Operating Foot Controls:     Driving / Operate Machinery:     Provider Name:   Сергей Sterling D.O. Physician Signature:  Physician Name:     Clinic Name / Location: [address],   [address]  [place] [address] Clinic Phone  Number: Dept: [phone]   Appointment Time: 2:45 Pm Visit Start Time: 2:33 PM   Check-In Time:  2:28 Pm Visit Discharge Time:  3pm   Original-Treating Physician or Chiropractor    Page 2-Insurer/TPA    Page 3-Employer    Page 4-Employee

## 2021-07-15 ENCOUNTER — PHYSICAL THERAPY (OUTPATIENT)
Dept: PHYSICAL THERAPY | Facility: REHABILITATION | Age: 46
End: 2021-07-15
Attending: PREVENTIVE MEDICINE
Payer: COMMERCIAL

## 2021-07-15 DIAGNOSIS — S46.912D STRAIN OF LEFT SHOULDER, SUBSEQUENT ENCOUNTER: ICD-10-CM

## 2021-07-15 DIAGNOSIS — F07.81 POST CONCUSSIVE SYNDROME: ICD-10-CM

## 2021-07-15 DIAGNOSIS — S22.42XD CLOSED FRACTURE OF MULTIPLE RIBS OF LEFT SIDE WITH ROUTINE HEALING, SUBSEQUENT ENCOUNTER: ICD-10-CM

## 2021-07-15 DIAGNOSIS — S39.012D STRAIN OF LUMBAR REGION, SUBSEQUENT ENCOUNTER: ICD-10-CM

## 2021-07-15 PROCEDURE — 97112 NEUROMUSCULAR REEDUCATION: CPT

## 2021-07-15 PROCEDURE — 97110 THERAPEUTIC EXERCISES: CPT

## 2021-07-15 NOTE — OP THERAPY DAILY TREATMENT
"  Outpatient Physical Therapy  DAILY TREATMENT     [facility] Physical [address].  [address]  [place] [address]  Phone:  [phone]  Fax:  [phone]    Date: 07/15/2021    Patient: Nadeem Araujo  YOB: 1975  MRN: [id number]     Time Calculation    Start time: 1445  Stop time: 1523 Time Calculation (min): 38 minutes         Chief Complaint: Work-Related Injury and Shoulder Problem    Visit #: 3    SUBJECTIVE:  The pt states that his shoulder symptoms are about the same, but he has noticed improvements in his dizziness. He states that he is not getting dizzy as frequently and if he does, the symptoms last 50% less time. The pt had a follow up with his occupational medicine doctor on 7/8/21 with the following assessment, \"Superior labrum anterior-to-posterior (SLAP) tear of left shoulder  - REFERRAL TO ORTHOPEDICS     Released to Restricted Duty FROM 7/8/2021 TO 8/5/2021  Office/Sedentary work only     Given MRI findings referral to orthopedics for further evaluation  Continue physical/vestibular therapy  Recommend OTC Tylenol 1000 mg 3 times daily as needed for pain  Refill tizanidine to use at night as needed  We will recommend restricted duty at   this time, office/sedentary work only  Follow-up 4 weeks\"      OBJECTIVE:  Current objective measures: (-) L gene-hallpike          Therapeutic Exercises (CPT 21083):     1. UBE, L3, 4 minutes anterior    2. Pulleys, 20x     3. Wall flexion AAROM, 10x each    4. Wall diagonals AAROM, 10x5\"    5. T-band rows, 20 reps, Black t-band    6. T-band ER/IR , 2x10, Blue t-band    7. Foam roll t-band horizontal abduction , 2x10, Blue t-band    8. Ball wall circles, 20x each, CW, CCW flexion and abduction, Weighted ball    9. Ball flexion AAROM, 10x3\"      Therapeutic Exercise Summary: The pt was given print out of exercise program detailed below to participate in until his next visit.     Access Code: [email or web address]  URL: " "[email or web address]  Date: 07/15/2021  Prepared by: Daksha Floyd    Exercises  Seated Gaze Stabilization with Head Rotation - 1 x daily - 7 x weekly - 3 sets - 10 reps  Standing Shoulder Horizontal Abduction with Resistance - 1 x daily - 7 x weekly - 3 sets - 10 reps  Shoulder Internal Rotation with Resistance - 1 x daily - 7 x weekly - 3 sets - 10 reps  Shoulder External Rotation with Anchored Resistance - 1 x daily - 7 x weekly - 3 sets - 10 reps      Therapeutic Treatments and Modalities:     1. Neuromuscular Re-education (CPT 41785), See below    Therapeutic Treatment and Modalities Summary: Neuromuscular Re-Education  -Overground balance training without UE assist  -Firm surface eyes closed narrow stance for 60\"   -Firm surface single leg hold marches, 10x5\" each side  -Firm surface narrow stance with head turns R-L, up-down, 10x each   -VORx1 in sitting horizontal and vertical, 10 reps of each       Time-based treatments/modalities:    Physical Therapy Timed Treatment Charges  Neuromusc re-ed, balance, coor, post minutes (CPT 13041): 15 minutes  Therapeutic exercise minutes (CPT 64650): 23 minutes    ASSESSMENT:   Response to treatment: The pt is making good progress regarding his vestibular symptoms, demonstrated by negative gene-hallpike and tolerance to initiation of vestibular rehabilitation exercises. The pt tolerated a progression in shoulder stabilization and mobility but does continue to report mild shoulder discomfort, especially with overhead and lateral reaching movements. The pt expressed good understanding of prescribed HEP and will continue to benefit from skilled physical therapy intervention to maximize his return to PLOF.     PLAN/RECOMMENDATIONS:   Plan for treatment: therapy treatment to continue next visit.  Planned interventions for next visit: continue with current treatment. Continue to progress shoulder stabilization, vestibular rehab and balance.      "

## 2021-07-30 ENCOUNTER — PHYSICAL THERAPY (OUTPATIENT)
Dept: PHYSICAL THERAPY | Facility: REHABILITATION | Age: 46
End: 2021-07-30
Attending: PREVENTIVE MEDICINE
Payer: COMMERCIAL

## 2021-07-30 DIAGNOSIS — S22.42XD CLOSED FRACTURE OF MULTIPLE RIBS OF LEFT SIDE WITH ROUTINE HEALING, SUBSEQUENT ENCOUNTER: ICD-10-CM

## 2021-07-30 DIAGNOSIS — F07.81 POST CONCUSSIVE SYNDROME: ICD-10-CM

## 2021-07-30 DIAGNOSIS — S46.912D STRAIN OF LEFT SHOULDER, SUBSEQUENT ENCOUNTER: ICD-10-CM

## 2021-07-30 DIAGNOSIS — S39.012D STRAIN OF LUMBAR REGION, SUBSEQUENT ENCOUNTER: ICD-10-CM

## 2021-07-30 PROCEDURE — 97110 THERAPEUTIC EXERCISES: CPT

## 2021-07-30 NOTE — OP THERAPY DAILY TREATMENT
"  Outpatient Physical Therapy  DAILY TREATMENT     [facility] Physical [address].  [address]  [place] [address]  Phone:  [phone]  Fax:  [phone]    Date: 07/30/2021    Patient: Nadeem Araujo  YOB: 1975  MRN: [id number]     Time Calculation    Start time: 1530  Stop time: 1608 Time Calculation (min): 38 minutes         Chief Complaint: Shoulder Problem    Visit #: 4    SUBJECTIVE:  The pt states that overall his dizziness has resolved since starting physical therapy. He reports that his shoulder was sore the day following physical therapy, but symptoms improved the following day. He states that he was able to initiate home exercises approximately every other day to minimize soreness. He saw his orthopedic doctor last week who reviewed results of his MRI (detailed below). He is agreeable to PT today.     OBJECTIVE:  Current objective measures: Imaging review from pt's office visit on 7/22/21:   Imaging:  MRI of the left shoulder demonstrates a SLAP tear involving the biceps anchor some tendinosis of the supraspinatus but no tear.  Long of the biceps tendon looks intact.  X-rays left shoulder demonstrates some rib fractures but no other acute abnormalities.          Therapeutic Exercises (CPT 50097):     1. UBE, L3, 5 minutes anterior    2. Pulleys, 20x     3. Wall flexion AAROM, 10x each    4. Wall diagonals AAROM, 10x5\"    5. T-band rows, 20 reps, Blue t-band    6. T-band ER/IR , 2x10 (ER modified range), Blue t-band    7. Foam roll t-band clocks, 2x10, Green t-band    8. Ball wall circles, 20x each, CW, CCW flexion and abduction, Weighted ball    9. Ball flexion AAROM, 10x3\"    10. Prone Houghston's x4, 15x each with visual cueing    11. Plinth plank shoulder taps, 10x each     12. Supine wand flexion, 10x5\"    13. Serratus punches, 20x, 2# dumbell      Therapeutic Exercise Summary: Pt educated to continue with HEP as detailed below.     Access Code: " [email or web address]  URL: [email or web address]  Date: 07/15/2021  Prepared by: Daksha Floyd    Exercises  Seated Gaze Stabilization with Head Rotation - 1 x daily - 7 x weekly - 3 sets - 10 reps  Standing Shoulder Horizontal Abduction with Resistance - 1 x daily - 7 x weekly - 3 sets - 10 reps  Shoulder Internal Rotation with Resistance - 1 x daily - 7 x weekly - 3 sets - 10 reps  Shoulder External Rotation with Anchored Resistance - 1 x daily - 7 x weekly - 3 sets - 10 reps      Therapeutic Treatments and Modalities:     Therapeutic Treatment and Modalities Summary:       Time-based treatments/modalities:    Physical Therapy Timed Treatment Charges  Therapeutic exercise minutes (CPT 63139): 38 minutes    ASSESSMENT:   Response to treatment: The pt tolerated a progression in therex for shoulder stabilization and mobility with reports of occasional discomfort but no significant onset of shoulder pain. He demonstrated improved mechanics with overhead shoulder exercises and required less frequent verbal cueing for form. The pt will continue to benefit from skilled physical therapy intervention to maximize return to PLOF.     PLAN/RECOMMENDATIONS:   Plan for treatment: therapy treatment to continue next visit.  Planned interventions for next visit: continue with current treatment. Continue with progression of shoulder stabilization program.

## 2021-08-03 ENCOUNTER — PHYSICAL THERAPY (OUTPATIENT)
Dept: PHYSICAL THERAPY | Facility: REHABILITATION | Age: 46
End: 2021-08-03
Attending: PREVENTIVE MEDICINE
Payer: COMMERCIAL

## 2021-08-03 DIAGNOSIS — S46.912D STRAIN OF LEFT SHOULDER, SUBSEQUENT ENCOUNTER: ICD-10-CM

## 2021-08-03 DIAGNOSIS — S39.012D STRAIN OF LUMBAR REGION, SUBSEQUENT ENCOUNTER: ICD-10-CM

## 2021-08-03 DIAGNOSIS — S22.42XD CLOSED FRACTURE OF MULTIPLE RIBS OF LEFT SIDE WITH ROUTINE HEALING, SUBSEQUENT ENCOUNTER: ICD-10-CM

## 2021-08-03 PROCEDURE — 97110 THERAPEUTIC EXERCISES: CPT

## 2021-08-03 NOTE — OP THERAPY DAILY TREATMENT
"  Outpatient Physical Therapy  DAILY TREATMENT     [facility] Physical [address].  [address]  [place] [address]  Phone:  [phone]  Fax:  [phone]    Date: 08/03/2021    Patient: Nadeem Araujo  YOB: 1975  MRN: [id number]     Time Calculation    Start time: 0945  Stop time: 1023 Time Calculation (min): 38 minutes         Chief Complaint: Shoulder Injury and Work-Related Injury    Visit #: 5    SUBJECTIVE:  The pt states that his shoulder feels about the same. He states that he still gets sore after participation in exercise program. He states that overall his dizziness has improved.     OBJECTIVE:  Current objective measures: Supine L shoulder flexion AAROM to 170 deg          Therapeutic Exercises (CPT 22933):     1. UBE, L3, 5 minutes anterior    2. Pulleys, 30x    3. Wall flexion AAROM, 20x    4. Wall diagonals AAROM, HEP    5. T-band rows, 30 reps, Black t-band    6. T-band ER/IR , 3x10 (ER modified range), Black t-band    7. Foam roll t-band clocks, 10x each , Teal t-band    8. Ball wall circles, 30x each, CW, CCW flexion and 20x abduction, Weighted ball    9. Ball flexion AAROM, 10x    10. Prone Houghston's x4, 15x each, 1# dumbell    11. Plinth plank shoulder taps, 10x each    12. Supine wand flexion, 10x5\"    13. Serratus punches, 20x, 2# dumbell    14. T-band lat pulldown, 20x, Black t-band    20. POC:  07/07/2021 to  08/18/21      Therapeutic Exercise Summary: Pt educated to continue with HEP as detailed below.     Access Code: [email or web address]  URL: [email or web address]  Date: 07/15/2021  Prepared by: Daksha Floyd    Exercises  Seated Gaze Stabilization with Head Rotation - 1 x daily - 7 x weekly - 3 sets - 10 reps  Standing Shoulder Horizontal Abduction with Resistance - 1 x daily - 7 x weekly - 3 sets - 10 reps  Shoulder Internal Rotation with Resistance - 1 x daily - 7 x weekly - 3 sets - 10 reps  Shoulder External Rotation with Anchored Resistance - " 1 x daily - 7 x weekly - 3 sets - 10 reps      Therapeutic Treatments and Modalities:     Therapeutic Treatment and Modalities Summary:       Time-based treatments/modalities:    Physical Therapy Timed Treatment Charges  Therapeutic exercise minutes (CPT 78478): 38 minutes    ASSESSMENT:   Response to treatment: The pt did tolerate a progression in therex for shoulder stabilization, but continues to report shoulder soreness throughout exercise program. He is making improvements in his shoulder AROM and recruitment of periscapular musculature during stabilization exercises. He will continue to benefit from skilled physical therapy intervention to maximize return to PLOF.     PLAN/RECOMMENDATIONS:   Plan for treatment: therapy treatment to continue next visit.  Planned interventions for next visit: continue with current treatment. Continue to progress shoulder mobility and stabilization to tolerance.

## 2021-08-05 ENCOUNTER — PHYSICAL THERAPY (OUTPATIENT)
Dept: PHYSICAL THERAPY | Facility: REHABILITATION | Age: 46
End: 2021-08-05
Attending: PREVENTIVE MEDICINE
Payer: COMMERCIAL

## 2021-08-05 ENCOUNTER — OCCUPATIONAL MEDICINE (OUTPATIENT)
Dept: OCCUPATIONAL MEDICINE | Facility: CLINIC | Age: 46
End: 2021-08-05
Payer: COMMERCIAL

## 2021-08-05 VITALS
WEIGHT: 227 LBS | HEART RATE: 87 BPM | SYSTOLIC BLOOD PRESSURE: 120 MMHG | HEIGHT: 70 IN | RESPIRATION RATE: 12 BRPM | BODY MASS INDEX: 32.5 KG/M2 | OXYGEN SATURATION: 95 % | DIASTOLIC BLOOD PRESSURE: 88 MMHG

## 2021-08-05 DIAGNOSIS — S43.432A SUPERIOR LABRUM ANTERIOR-TO-POSTERIOR (SLAP) TEAR OF LEFT SHOULDER: ICD-10-CM

## 2021-08-05 DIAGNOSIS — F07.81 POST CONCUSSIVE SYNDROME: ICD-10-CM

## 2021-08-05 DIAGNOSIS — S22.42XD CLOSED FRACTURE OF MULTIPLE RIBS OF LEFT SIDE WITH ROUTINE HEALING, SUBSEQUENT ENCOUNTER: ICD-10-CM

## 2021-08-05 DIAGNOSIS — S46.912D STRAIN OF LEFT SHOULDER, SUBSEQUENT ENCOUNTER: ICD-10-CM

## 2021-08-05 DIAGNOSIS — S39.012D STRAIN OF LUMBAR REGION, SUBSEQUENT ENCOUNTER: ICD-10-CM

## 2021-08-05 DIAGNOSIS — S02.91XD CLOSED FRACTURE OF SKULL WITH ROUTINE HEALING, UNSPECIFIED BONE, SUBSEQUENT ENCOUNTER: ICD-10-CM

## 2021-08-05 PROCEDURE — 99213 OFFICE O/P EST LOW 20 MIN: CPT | Performed by: PREVENTIVE MEDICINE

## 2021-08-05 PROCEDURE — 97110 THERAPEUTIC EXERCISES: CPT

## 2021-08-05 ASSESSMENT — ENCOUNTER SYMPTOMS
SENSORY CHANGE: 0
TINGLING: 0

## 2021-08-05 ASSESSMENT — FIBROSIS 4 INDEX: FIB4 SCORE: 1.47

## 2021-08-05 NOTE — LETTER
[address],   [address] [place] [address]  Phone:  [phone] - Fax:  [phone]   Occupational Health [facility] Progress Report and Disability Certification  Date of Service: 8/5/2021   No Show:  No  Date / Time of Next Visit: 9/2/2021 @ 2:45pm   Claim Information   Patient Name: Nadeem Araujo  Claim Number:     Employer: [employer]  Date of Injury: 4/20/2021     Insurer / TPA: Marga Assigned Risk  ID / SSN:     Occupation: [employer]  Diagnosis: Diagnoses of Closed fracture of skull with routine healing, unspecified bone, subsequent encounter, Closed fracture of multiple ribs of left side with routine healing, subsequent encounter, Strain of left shoulder, subsequent encounter, Superior labrum anterior-to-posterior (SLAP) tear of left shoulder, Post concussive syndrome, and Strain of lumbar region, subsequent encounter were pertinent to this visit.    Medical Information   Related to Industrial Injury? Yes    Subjective Complaints:  DOI 4/20/2021: 46-year-old male who reportedly fell 12 feet from a scaffolding onto concrete below. He struck the back of his head and left chest wall. See prior note and hospital discharge for detailed history.  Patient states that overall the dizziness is about 90% improved.  Dizziness is very occasional at this point.  He states that the ribs have been improving as well more so over the last month and at any point before hand.  He still does get some pain and tenderness especially at night after laying on his side.  He states the skull fracture as well seems to be mostly improved he just gets occasional pain a few times a week.  He states the most concerning symptom at this point is his left shoulder.  He was seen by orthopedics who is recommending ultrasound-guided steroid joint injection but she is scheduled to do in 2 weeks.  He is also recommended continue physical therapy.   Objective  Findings: Neuro: Alert and oriented x3.  Cranial nerves grossly intact.  Cervical: No gross deformity.  Full range of motion.  Left shoulder: No gross deformity.  Area of pain over the anterior GH and lateral shoulder.  Range of motion diminished to less than about 150 degrees flexion/abduction, with pain.  Chest wall:   Good chest wall movement.  Good respiratory effort.  Mild tenderness over the lateral ribs on the left side     Pre-Existing Condition(s):     Assessment:   Condition Improved    Status: Additional Care Required  Permanent Disability:No    Plan:      Diagnostics:      Comments:  Continue care with orthopedics  Continue physical therapy  Continue OTC Tylenol as needed  Continue tizanidine at night as needed  Restricted duty  Follow-up 1 month    Disability Information   Status: Released to Restricted Duty    From:  8/5/2021  Through: 9/2/2021 Restrictions are: Temporary   Physical Restrictions   Sitting:    Standing:  < or = to 4 hrs/day Stooping:  < or = to 1 hr/day Bending:  < or = to 1 hr/day   Squatting:    Walking:  < or = to 2 hrs/day Climbing:    Pushing:  < or = to 1 hr/day   Pulling:  < or = to 1 hr/day Other:    Reaching Above Shoulder (L): < or = to 1 hr/day Reaching Above Shoulder (R):       Reaching Below Shoulder (L):    Reaching Below Shoulder (R):      Not to exceed Weight Limits   Carrying(hrs):   Weight Limit(lb): < or = to 10 pounds Lifting(hrs):   Weight  Limit(lb): < or = to 10 pounds   Comments:      Repetitive Actions   Hands: i.e. Fine Manipulations from Grasping:     Feet: i.e. Operating Foot Controls:     Driving / Operate Machinery:     Provider Name:   Сергей Sterling D.O. Physician Signature:  Physician Name:     Clinic Name / Location: [address],   [address]  [place] [address] Clinic Phone Number: Dept: [phone]   Appointment Time: 2:45 Pm Visit Start Time: 2:33 PM   Check-In Time:  2:30 Pm Visit Discharge Time:  3pm    Original-Treating Physician or Chiropractor    Page 2-Insurer/TPA    Page 3-Employer    Page 4-Employee

## 2021-08-05 NOTE — OP THERAPY DAILY TREATMENT
Outpatient Physical Therapy  DAILY TREATMENT     [facility] Physical [address].  [address]  [place] [address]  Phone:  [phone]  Fax:  [phone]    Date: 08/05/2021    Patient: Nadeem Araujo  YOB: 1975  MRN: [id number]     Time Calculation    Start time: 0947  Stop time: 1025 Time Calculation (min): 38 minutes         Chief Complaint: Shoulder Problem and Work-Related Injury    Visit #: 6    SUBJECTIVE:  The pt states that his shoulder feels slightly better but he still gets very sore with exercises and certain ranges of motion. He states that he uses cryotherapy as needed for pain management. He reports that his dizziness is about 90% better.     OBJECTIVE:          Therapeutic Exercises (CPT 19508):     1. UBE, L4, 5 minutes anterior    2. Pulleys, 30x    3. Wall flexion AAROM, HEP    4. Wall diagonals AAROM, HEP    5. Cable rows, 2x10, 20#    6. T-band ER/IR , Not today, Black t-band    7. Foam roll t-band clocks, 15x each, Teal t-band    8. Ball wall circles, 30x each, CW, CCW flexion only, Weighted ball    9. Ball flexion AAROM, 10x    10. Prone Houghston's x4, 15x each, 2# dumbell    11. Plinth plank shoulder taps, 15x each    12. Supine wand flexion, 20x    13. Serratus punches, 20x, 2# dumbell    14. Single arm cable lat pulldown, 2x10, 5#    15. Ball diagonals AAROM, 10x each    16. Cable bicep curl- double arm, 2x10, 5#    17. Foam roll flashers, 2x10, Teal t-band    20. POC:  07/07/2021 to  08/18/21      Therapeutic Exercise Summary: Pt educated to continue with HEP as detailed below.     Access Code: [email or web address]  URL: [email or web address]  Date: 07/15/2021  Prepared by: Daksha Floyd    Exercises  Seated Gaze Stabilization with Head Rotation - 1 x daily - 7 x weekly - 3 sets - 10 reps  Standing Shoulder Horizontal Abduction with Resistance - 1 x daily - 7 x weekly - 3 sets - 10 reps  Shoulder Internal Rotation with Resistance - 1 x daily - 7 x  weekly - 3 sets - 10 reps  Shoulder External Rotation with Anchored Resistance - 1 x daily - 7 x weekly - 3 sets - 10 reps        Time-based treatments/modalities:    Physical Therapy Timed Treatment Charges  Therapeutic exercise minutes (CPT 28699): 38 minutes    ASSESSMENT:   Response to treatment: The pt tolerated a progression in therex for shoulder strengthening and stabilization with intermittent reports of discomfort but no significant increased pain. He is demonstrating improved tolerance to overhead activities and overall improved shoulder strength and function. The pt will continue to benefit from skilled physical therapy intervention to continue to address deficits in L shoulder strength and to maximize the pt's return to PLOF.     PLAN/RECOMMENDATIONS:   Plan for treatment: therapy treatment to continue next visit.  Planned interventions for next visit: continue with current treatment. Continue to progress shoulder strength and stabilization as tolerated by patient.

## 2021-08-05 NOTE — PROGRESS NOTES
"Subjective:     Nadeem Araujo is a 46 y.o. male who presents for Follow-Up (WC DOI: 4/20/21 Head; Same Rm 16)      DOI 4/20/2021: 46-year-old male who reportedly fell 12 feet from a scaffolding onto concrete below. He struck the back of his head and left chest wall. See prior note and hospital discharge for detailed history.  Patient states that overall the dizziness is about 90% improved.  Dizziness is very occasional at this point.  He states that the ribs have been improving as well more so over the last month and at any point before hand.  He still does get some pain and tenderness especially at night after laying on his side.  He states the skull fracture as well seems to be mostly improved he just gets occasional pain a few times a week.  He states the most concerning symptom at this point is his left shoulder.  He was seen by orthopedics who is recommending ultrasound-guided steroid joint injection but she is scheduled to do in 2 weeks.  He is also recommended continue physical therapy.    Review of Systems   Neurological: Negative for tingling and sensory change.       SOCHX: Works as a daniel at [employer]  FH: No pertinent family history to this problem.       Objective:     /88   Pulse 87   Resp 12   Ht 1.778 m (5' 10\")   Wt 103 kg (227 lb)   SpO2 95%   BMI 32.57 kg/m²     Constitutional: Patient is in no acute distress. Appears well-developed and well-nourished.   Cardiovascular: Normal rate.    Pulmonary/Chest: Effort normal. No respiratory distress.   Neurological: Patient is alert and oriented to person, place, and time.   Skin: Skin is warm and dry.   Psychiatric: Normal mood and affect. Behavior is normal.     Neuro: Alert and oriented x3.  Cranial nerves grossly intact.  Cervical: No gross deformity.  Full range of motion.  Left shoulder: No gross deformity.  Area of pain over the anterior GH and lateral shoulder.  Range of motion diminished to less than about 150 " degrees flexion/abduction, with pain.  Chest wall:   Good chest wall movement.  Good respiratory effort.  Mild tenderness over the lateral ribs on the left side      Assessment/Plan:       1. Closed fracture of skull with routine healing, unspecified bone, subsequent encounter    2. Closed fracture of multiple ribs of left side with routine healing, subsequent encounter    3. Strain of left shoulder, subsequent encounter    4. Superior labrum anterior-to-posterior (SLAP) tear of left shoulder    5. Post concussive syndrome    6. Strain of lumbar region, subsequent encounter    Released to Restricted Duty FROM 8/5/2021 TO 9/2/2021       Continue care with orthopedics  Continue physical therapy  Continue OTC Tylenol as needed  Continue tizanidine at night as needed  Restricted duty  Follow-up 1 month    Differential diagnosis, natural history, supportive care, and indications for immediate follow-up discussed.    Approximately 25 minutes was spent in preparing for visit, obtaining history, exam and evaluation, patient counseling/education and post visit documentation/orders.

## 2021-08-10 ENCOUNTER — PHYSICAL THERAPY (OUTPATIENT)
Dept: PHYSICAL THERAPY | Facility: REHABILITATION | Age: 46
End: 2021-08-10
Attending: PREVENTIVE MEDICINE
Payer: COMMERCIAL

## 2021-08-10 DIAGNOSIS — S39.012D STRAIN OF LUMBAR REGION, SUBSEQUENT ENCOUNTER: ICD-10-CM

## 2021-08-10 DIAGNOSIS — S46.912D STRAIN OF LEFT SHOULDER, SUBSEQUENT ENCOUNTER: ICD-10-CM

## 2021-08-10 DIAGNOSIS — S22.42XD CLOSED FRACTURE OF MULTIPLE RIBS OF LEFT SIDE WITH ROUTINE HEALING, SUBSEQUENT ENCOUNTER: ICD-10-CM

## 2021-08-10 DIAGNOSIS — F07.81 POST CONCUSSIVE SYNDROME: ICD-10-CM

## 2021-08-10 PROCEDURE — 97110 THERAPEUTIC EXERCISES: CPT

## 2021-08-10 PROCEDURE — 97112 NEUROMUSCULAR REEDUCATION: CPT

## 2021-08-10 NOTE — OP THERAPY DAILY TREATMENT
Outpatient Physical Therapy  DAILY TREATMENT     [facility] Physical [address]  [address]  [place] [address]  Phone:  [phone]  Fax:  [phone]    Date: 08/10/2021    Patient: Nadeem Araujo  YOB: 1975  MRN: [id number]     Time Calculation    Start time: 0945  Stop time: 1025 Time Calculation (min): 40 minutes         Chief Complaint: Shoulder Problem    Visit #: 7    SUBJECTIVE:  The pt states that his shoulder was a little less sore than usual after last visit. He states that his rib pain is improving slowly and his dizziness is improving as well. He is agreeable to PT.     OBJECTIVE:          Therapeutic Exercises (CPT 43267):     1. UBE, L4, 5 minutes anterior, Alternating directions on every minute    2. Pulleys, 2 min    3. Wall flexion AAROM, 20x    4. Wall diagonals AAROM, HEP    5. Cable rows, 3x10, 20#, Tactile and visual cueing for scap retraction    6. T-band ER/IR , Not today, Black t-band    7. Foam roll t-band clocks, Not today, Teal t-band    8. Ball wall circles, Not today    9. Ball flexion AAROM, 10x    10. Prone Houghston's x4, 15x each , 2# dumbell    11. Plinth plank shoulder taps, Not today    12. Supine wand flexion, HEP    13. Serratus punches, Not today, 2# dumbell    14. Double arm cable lat pulldown, 3x10, 15#    15. Ball diagonals AAROM, Not today    16. Cable bicep curl- double arm, 3x10, 10#    17. Foam roll flashers, Not today, Teal t-band    20. POC:  07/07/2021 to  08/18/21      Therapeutic Exercise Summary: Pt educated to continue with HEP as detailed below.     Access Code: [email or web address]  URL: [email or web address]  Date: 07/15/2021  Prepared by: Daksha Floyd    Exercises  Seated Gaze Stabilization with Head Rotation - 1 x daily - 7 x weekly - 3 sets - 10 reps  Standing Shoulder Horizontal Abduction with Resistance - 1 x daily - 7 x weekly - 3 sets - 10 reps  Shoulder Internal Rotation with Resistance - 1 x daily - 7 x  weekly - 3 sets - 10 reps  Shoulder External Rotation with Anchored Resistance - 1 x daily - 7 x weekly - 3 sets - 10 reps      Therapeutic Treatments and Modalities:     1. Neuromuscular Re-education (CPT 59862), See below    Therapeutic Treatment and Modalities Summary: Neuromuscular re-education:  -VORx1- R-L, up-down 10x each  -VOR cancellation, 10x R-L  -Walking with head turns R-L, up-down 2 laps each     Time-based treatments/modalities:    Physical Therapy Timed Treatment Charges  Neuromusc re-ed, balance, coor, post minutes (CPT 43813): 8 minutes  Therapeutic exercise minutes (CPT 64253): 30 minutes    ASSESSMENT:   Response to treatment: The pt tolerated a progression in therex for shoulder strengthening and stabilization without an increase in shoulder pain. He tolerated progression of vestibular re-education exercises to promote vestibulo-ocular reflex. The pt is making good functional progress with PT intervention and continues to be motivated to participate in PT to maximize his return to PLOF.      PLAN/RECOMMENDATIONS:   Plan for treatment: therapy treatment to continue next visit.  Planned interventions for next visit: continue with current treatment.  Complete progress update next session.

## 2021-08-12 ENCOUNTER — PHYSICAL THERAPY (OUTPATIENT)
Dept: PHYSICAL THERAPY | Facility: REHABILITATION | Age: 46
End: 2021-08-12
Attending: PREVENTIVE MEDICINE
Payer: COMMERCIAL

## 2021-08-12 DIAGNOSIS — S22.42XD CLOSED FRACTURE OF MULTIPLE RIBS OF LEFT SIDE WITH ROUTINE HEALING, SUBSEQUENT ENCOUNTER: ICD-10-CM

## 2021-08-12 DIAGNOSIS — S39.012D STRAIN OF LUMBAR REGION, SUBSEQUENT ENCOUNTER: ICD-10-CM

## 2021-08-12 DIAGNOSIS — S46.912D STRAIN OF LEFT SHOULDER, SUBSEQUENT ENCOUNTER: ICD-10-CM

## 2021-08-12 PROCEDURE — 97110 THERAPEUTIC EXERCISES: CPT

## 2021-08-12 NOTE — OP THERAPY PROGRESS SUMMARY
Outpatient Physical Therapy  PROGRESS SUMMARY NOTE      [facility] Physical Therapy [address]  [address]  [place] [address]  Phone:  [phone]  Fax:  [phone]    Date of Visit: 08/12/2021    Patient: Nadeem Araujo  YOB: 1975  MRN: [id number]     Referring Provider: Сергей Sterling D.O.  [address] [facility]  [address]  [place] [address]   Referring Diagnosis Closed fracture of multiple ribs of left side with routine healing, subsequent encounter [S22.42XD];Strain of left shoulder, subsequent encounter [S46.912D];Post concussive syndrome [F07.81]     Visit Diagnoses     ICD-10-CM   1. Closed fracture of multiple ribs of left side with routine healing, subsequent encounter  S22.42XD   2. Strain of left shoulder, subsequent encounter  S46.912D   3. Strain of lumbar region, subsequent encounter  S39.012D       Rehab Potential: good    Progress Report Period: 07/07/21-08/12/21            Objective Findings and Assessment:   Patient progression towards goals: Nadeem Araujo has been seen for 8 PT visits since the start of care for L shoulder pain secondary to a superior labrum anterior-to-posterior (SLAP) tear, dizziness, and rib pain following a work related injury. Since the start of care, the pt has made excellent improvements in his subjective dizziness as well as his rib pain with functional tasks. Regarding his shoulder, the pt has made overall improvements in AROM, strength, sleeping tolerance, functional use of the L UE and tolerance to therapeutic activities. He continues to be limited with full strength and stability of the L UE, decreased L shoulder AROM, limited lifting tolerance, and decreased tolerance to required work duties. The pt will be following up with an orthopedic physician next week for the shoulder. The pt has met 3/8 of his initially established PT goals with progress made towards remaining goals. Based on the pt's improvements since the start of care but  remaining functional limitations, it is anticipated that he will continue to benefit from skilled physical therapy intervention to maximize his return to PLOF.     Objective findings and assessment details: Left Shoulder   Flexion: 160 (150 degrees)  Abduction: 145 (130 degrees)  External rotation BTH: T4 (T2)  Internal rotation BTB: T11* (T12 (Pain))    Strength:    Left Shoulder   Planes of Motion   Flexion: 4+/5  (4/5)   Abduction: 4+/5 (4/5)  External rotation at 90°: 5-/5 (4+/5)  Internal rotation at 45°: 5-/5 (4+/5)    *Initial evaluation measures in ()    Goals:   Short Term Goals:   1. The pt will reports 80% improvement in his dizziness when waking in the morning (MET)  2. Pt will demonstrate L shoulder flexion AROM to 160 degrees to allow improved overhead reaching (MET, with some shoulder discomfort)  3. Pt will be able to sleep throughout the night without being awoken by pain (MET)  4. Pt will be able to lift 15# overhead with the L UE (Not met)    Short term goal time span:  2-4 weeks      Long Term Goals:    1. Pt will be able to lift 50# from the ground with B UEs (In progress)  2. Pt will be able to climb a ladder with good safety and stability (Not met, pt has not tried)  3. Pt will report self-efficacy in his ability to return to work (Not met)  4. Pt will be independent with HEP (In progress, continue)  Long term goal time span:  4-6 weeks    Plan:   Planned therapy interventions:  E Stim Unattended (CPT 75758), Manual Therapy (CPT 79769), Neuromuscular Re-education (CPT 00564), Therapeutic Activities (CPT 22017) and Therapeutic Exercise (CPT 00068)  Frequency:  2x week  Duration in weeks:  4  Duration in visits:  8  Plan details:  Continue with progression of shoulder stabilization, strength, and tolerance to work related activities.       Referring provider co-signature:  I have reviewed this plan of care and my co-signature certifies the need for services.     Certification Period: 08/12/2021  to 09/23/21    Physician Signature: ________________________________ Date: ______________

## 2021-08-12 NOTE — OP THERAPY DAILY TREATMENT
Outpatient Physical Therapy  DAILY TREATMENT     [facility] Physical [address]  [address]  [place] [address]  Phone:  [phone]  Fax:  [phone]    Date: 08/12/2021    Patient: Nadeem Araujo  YOB: 1975  MRN: [id number]     Time Calculation    Start time: 0945  Stop time: 1023 Time Calculation (min): 38 minutes         Chief Complaint: Shoulder Problem and Work-Related Injury    Visit #: 8    SUBJECTIVE:  Since the start of care the patient reports improvements in his dizziness, about 90% better than prior to physical therapy. He continues to report some discomfort in his ribs with laying directly on his ribs. He states that his shoulder is 70% better. He still reports pain in the shoulder with external rotation movements. He states that he is sleeping okay, sometimes uses Tylenol to improve his sleep. He reports he can lift with his L but with some pain.     OBJECTIVE:  Current objective measures: See progress update          Therapeutic Exercises (CPT 61682):     1. UBE, L4, 5 minutes anterior, Alternating directions on every minute    2. Pulleys, 2 min    3. Wall flexion AAROM, 10x    4. Wall diagonals AAROM, 10x    5. T-band W's, 20x teal band    6. T-band ER/IR , HEP, Black t-band    7. T-band rows, 20x teal band, Teal t-band    8. T-band horizontal abduction, 20x teal band    9. Ball flexion AAROM, 10x    10. Prone [place]ston's x4, 2x10, 2# dumbell      Therapeutic Exercise Summary: Updated pt's HEP as detailed below, reviewed all exercises with pt:    Access Code: [email or web address]  URL: [email or web address]  Date: 08/12/2021  Prepared by: Daksha Floyd    Exercises  Seated Gaze Stabilization with Head Rotation - 1 x daily - 7 x weekly - 3 sets - 10 reps  Standing Shoulder Horizontal Abduction with Resistance - 1 x daily - 7 x weekly - 3 sets - 10 reps  Shoulder Internal Rotation with Resistance - 1 x daily - 7 x weekly - 3 sets - 10 reps  Shoulder External  Rotation with Anchored Resistance - 1 x daily - 7 x weekly - 3 sets - 10 reps  Standing Shoulder Row with Anchored Resistance - 1 x daily - 7 x weekly - 3 sets - 10 reps  Shoulder W - External Rotation with Resistance - 1 x daily - 7 x weekly - 3 sets - 10 reps  Prone Shoulder Flexion - 1 x daily - 7 x weekly - 3 sets - 10 reps  Prone Shoulder Horizontal Abduction - 1 x daily - 7 x weekly - 3 sets - 10 reps  Prone Shoulder Extension - Single Arm - 1 x daily - 7 x weekly - 3 sets - 10 reps        Time-based treatments/modalities:    Physical Therapy Timed Treatment Charges  Therapeutic exercise minutes (CPT 84611): 38 minutes    ASSESSMENT:   Response to treatment: See progress update    PLAN/RECOMMENDATIONS:   Plan for treatment: therapy treatment to continue next visit.  Planned interventions for next visit: continue with current treatment.

## 2021-09-02 ENCOUNTER — OCCUPATIONAL MEDICINE (OUTPATIENT)
Dept: OCCUPATIONAL MEDICINE | Facility: CLINIC | Age: 46
End: 2021-09-02
Payer: COMMERCIAL

## 2021-09-02 VITALS
OXYGEN SATURATION: 98 % | WEIGHT: 227 LBS | TEMPERATURE: 99.1 F | HEART RATE: 81 BPM | SYSTOLIC BLOOD PRESSURE: 110 MMHG | BODY MASS INDEX: 32.5 KG/M2 | RESPIRATION RATE: 14 BRPM | HEIGHT: 70 IN | DIASTOLIC BLOOD PRESSURE: 72 MMHG

## 2021-09-02 DIAGNOSIS — S22.42XD CLOSED FRACTURE OF MULTIPLE RIBS OF LEFT SIDE WITH ROUTINE HEALING, SUBSEQUENT ENCOUNTER: ICD-10-CM

## 2021-09-02 DIAGNOSIS — S02.91XD CLOSED FRACTURE OF SKULL WITH ROUTINE HEALING, UNSPECIFIED BONE, SUBSEQUENT ENCOUNTER: ICD-10-CM

## 2021-09-02 DIAGNOSIS — S43.432A SUPERIOR LABRUM ANTERIOR-TO-POSTERIOR (SLAP) TEAR OF LEFT SHOULDER: ICD-10-CM

## 2021-09-02 DIAGNOSIS — F07.81 POST CONCUSSIVE SYNDROME: ICD-10-CM

## 2021-09-02 DIAGNOSIS — S46.912D STRAIN OF LEFT SHOULDER, SUBSEQUENT ENCOUNTER: ICD-10-CM

## 2021-09-02 PROCEDURE — 99213 OFFICE O/P EST LOW 20 MIN: CPT | Performed by: PREVENTIVE MEDICINE

## 2021-09-02 ASSESSMENT — FIBROSIS 4 INDEX: FIB4 SCORE: 1.47

## 2021-09-02 NOTE — LETTER
[address],   [address] [place] [address]  Phone:  [phone]- Fax:  [phone]   Occupational Health [facility] Progress Report and Disability Certification  Date of Service: 9/2/2021   No Show:  No  Date / Time of Next Visit: 9/27/2021 @ 3:15 PM    Claim Information   Patient Name: Nadeem Araujo  Claim Number:     Employer: [employer]  Date of Injury: 4/20/2021     Insurer / TPA: Marga Assigned Risk  ID / SSN:     Occupation:   Diagnosis: Diagnoses of Closed fracture of skull with routine healing, unspecified bone, subsequent encounter, Closed fracture of multiple ribs of left side with routine healing, subsequent encounter, Superior labrum anterior-to-posterior (SLAP) tear of left shoulder, Post concussive syndrome, and Strain of left shoulder, subsequent encounter were pertinent to this visit.    Medical Information   Related to Industrial Injury? Yes    Subjective Complaints:  DOI 4/20/2021: 46-year-old male who reportedly fell 12 feet from a scaffolding onto concrete below. He struck the back of his head and left chest wall. See prior note and hospital discharge for detailed history.  Patient states overall he feels significantly improved.  He states is her skull fracture because he has no pain at rest and only very minimal pain with palpation of the area.  He also feels the dizziness has improved significantly it only happens very rarely now and is pretty much resolved.  He states the rib pain as well as pretty much resolved he only gets very occasional mild discomfort.  He states the most problematic thing is his left shoulder which is being treated by orthopedics for which he received a corticosteroid injection which did improve symptoms significantly.  He has follow-up with them in a few weeks.  However he supposed to be doing physical therapy but it has yet to be approved.   Objective Findings: Neuro: Alert and oriented  x3.  Cranial nerves grossly intact.  Cervical: No gross deformity.  Full range of motion.  Left shoulder: No gross deformity.  Area of pain over the anterior GH and lateral shoulder.  Essentially full range of motion.  Chest wall:   Good chest wall movement.  Good respiratory effort.  No tenderness.      Pre-Existing Condition(s):     Assessment:   Condition Improved    Status: Additional Care Required  Permanent Disability:No    Plan:      Diagnostics:      Comments:  Patient at this point mostly improved  OTC ibuprofen/Tylenol as needed  Placed referral for more physical therapy  Continue care with orthopedics  Removed restrictions except for those pertaining to left shoulder  Follow-up 1 month    Disability Information   Status: Released to Restricted Duty    From:  9/2/2021  Through: 9/27/2021 Restrictions are: Temporary   Physical Restrictions   Sitting:    Standing:    Stooping:    Bending:      Squatting:    Walking:    Climbing:    Pushing:      Pulling:    Other:    Reaching Above Shoulder (L): < or = to 1 hr/day Reaching Above Shoulder (R):       Reaching Below Shoulder (L):    Reaching Below Shoulder (R):      Not to exceed Weight Limits   Carrying(hrs):   Weight Limit(lb): < or = to 10 pounds Lifting(hrs):   Weight  Limit(lb): < or = to 10 pounds   Comments: Restrictions applied to left arm    Repetitive Actions   Hands: i.e. Fine Manipulations from Grasping:     Feet: i.e. Operating Foot Controls:     Driving / Operate Machinery:     Health Care Provider’s Original or Electronic Signature  Сергей Sterling D.O. Health Care Provider’s Original or Electronic Signature    Talat Bonilla MD       Clinic Name / Location: [address]   Suite [facility]  [place] [address] Clinic Phone Number: Dept: [phone]   Appointment Time: 2:45 Pm Visit Start Time: 2:36 PM   Check-In Time:  2:34 Pm Visit Discharge Time: 3 PM    Original-Treating Physician or Chiropractor    Page  2-Insurer/TPA    Page 3-Employer    Page 4-Employee

## 2021-09-02 NOTE — PROGRESS NOTES
"Subjective:     Nadeem Araujo is a 46 y.o. male who presents for Follow-Up (WC DOI: 4/20/21 Head; better - RM 17)      DOI 4/20/2021: 46-year-old male who reportedly fell 12 feet from a scaffolding onto concrete below. He struck the back of his head and left chest wall. See prior note and hospital discharge for detailed history.  Patient states overall he feels significantly improved.  He states is her skull fracture because he has no pain at rest and only very minimal pain with palpation of the area.  He also feels the dizziness has improved significantly it only happens very rarely now and is pretty much resolved.  He states the rib pain as well as pretty much resolved he only gets very occasional mild discomfort.  He states the most problematic thing is his left shoulder which is being treated by orthopedics for which he received a corticosteroid injection which did improve symptoms significantly.  He has follow-up with them in a few weeks.  However he supposed to be doing physical therapy but it has yet to be approved.    ROS         Objective:     /72   Pulse 81   Temp 37.3 °C (99.1 °F) (Temporal)   Resp 14   Ht 1.778 m (5' 10\")   Wt 103 kg (227 lb)   SpO2 98%   BMI 32.57 kg/m²     Constitutional: Patient is in no acute distress. Appears well-developed and well-nourished.   Cardiovascular: Normal rate.    Pulmonary/Chest: Effort normal. No respiratory distress.   Neurological: Patient is alert and oriented to person, place, and time.   Skin: Skin is warm and dry.   Psychiatric: Normal mood and affect. Behavior is normal.     Neuro: Alert and oriented x3.  Cranial nerves grossly intact.  Cervical: No gross deformity.  Full range of motion.  Left shoulder: No gross deformity.  Area of pain over the anterior GH and lateral shoulder.  Essentially full range of motion.  Chest wall:   Good chest wall movement.  Good respiratory effort.  No tenderness.       Assessment/Plan:       1. Closed fracture " of skull with routine healing, unspecified bone, subsequent encounter    2. Closed fracture of multiple ribs of left side with routine healing, subsequent encounter    3. Superior labrum anterior-to-posterior (SLAP) tear of left shoulder  - REFERRAL TO PHYSICAL THERAPY    4. Post concussive syndrome    5. Strain of left shoulder, subsequent encounter  - REFERRAL TO PHYSICAL THERAPY    Released to Restricted Duty FROM 9/2/2021 TO 9/27/2021  Restrictions applied to left arm    Patient at this point mostly improved  OTC ibuprofen/Tylenol as needed  Placed referral for more physical therapy  Continue care with orthopedics  Removed restrictions except for those pertaining to left shoulder  Follow-up 1 month    Differential diagnosis, natural history, supportive care, and indications for immediate follow-up discussed.    Approximately 25 minutes was spent in preparing for visit, obtaining history, exam and evaluation, patient counseling/education and post visit documentation/orders.

## 2021-09-16 PROBLEM — S43.432A SUPERIOR GLENOID LABRUM LESION OF LEFT SHOULDER: Status: ACTIVE | Noted: 2021-09-16

## 2021-09-30 ENCOUNTER — OCCUPATIONAL MEDICINE (OUTPATIENT)
Dept: OCCUPATIONAL MEDICINE | Facility: CLINIC | Age: 46
End: 2021-09-30
Payer: COMMERCIAL

## 2021-09-30 VITALS
HEIGHT: 70 IN | DIASTOLIC BLOOD PRESSURE: 84 MMHG | BODY MASS INDEX: 32.21 KG/M2 | HEART RATE: 86 BPM | RESPIRATION RATE: 18 BRPM | WEIGHT: 225 LBS | SYSTOLIC BLOOD PRESSURE: 128 MMHG

## 2021-09-30 DIAGNOSIS — S43.432A SUPERIOR LABRUM ANTERIOR-TO-POSTERIOR (SLAP) TEAR OF LEFT SHOULDER: ICD-10-CM

## 2021-09-30 DIAGNOSIS — S02.91XD CLOSED FRACTURE OF SKULL WITH ROUTINE HEALING, UNSPECIFIED BONE, SUBSEQUENT ENCOUNTER: ICD-10-CM

## 2021-09-30 DIAGNOSIS — S39.012D STRAIN OF LUMBAR REGION, SUBSEQUENT ENCOUNTER: ICD-10-CM

## 2021-09-30 DIAGNOSIS — S22.42XD CLOSED FRACTURE OF MULTIPLE RIBS OF LEFT SIDE WITH ROUTINE HEALING, SUBSEQUENT ENCOUNTER: ICD-10-CM

## 2021-09-30 DIAGNOSIS — F07.81 POST CONCUSSIVE SYNDROME: ICD-10-CM

## 2021-09-30 PROCEDURE — 99212 OFFICE O/P EST SF 10 MIN: CPT | Performed by: PREVENTIVE MEDICINE

## 2021-09-30 ASSESSMENT — FIBROSIS 4 INDEX: FIB4 SCORE: 1.47

## 2021-09-30 NOTE — LETTER
[address],   [address] [place] [address]  Phone:  [phone]- Fax:  [phone]   Occupational Health [facility] Progress Report and Disability Certification  Date of Service: 9/30/2021   No Show:  No  Date / Time of Next Visit:  Release from care   Claim Information   Patient Name: Nadeem Araujo  Claim Number:     Employer: [employer]  Date of Injury: 4/20/2021     Insurer / TPA: Marga Assigned Risk  ID / SSN:     Occupation: [employer]  Diagnosis: Diagnoses of Closed fracture of skull with routine healing, unspecified bone, subsequent encounter, Closed fracture of multiple ribs of left side with routine healing, subsequent encounter, Superior labrum anterior-to-posterior (SLAP) tear of left shoulder, Post concussive syndrome, and Strain of lumbar region, subsequent encounter were pertinent to this visit.    Medical Information   Related to Industrial Injury? Yes    Subjective Complaints:  DOI 4/20/2021: 46-year-old male who reportedly fell 12 feet from a scaffolding onto concrete below. He struck the back of his head and left chest wall. See prior note and hospital discharge for detailed history.  Patient states that the head injury, rib injury and back Inderal improved.  He states his only issue is the left shoulder.  He states that has mostly resolved as well.  He received a corticosteroid injection and has been pretty good since.  They upped his work restrictions and he is allowed to lift up to 25 pounds.  He states that he has been working light duty without difficulty.  He states he has another appointment in a couple weeks where he may be released to full duty.   Objective Findings: Neuro: Alert and oriented x3.  Cranial nerves grossly intact.  Cervical: No gross deformity.  Full range of motion.  Left shoulder: No gross deformity.   Essentially full range of motion.  Chest wall:   Good chest wall movement.  Good  respiratory effort.  No tenderness.   Pre-Existing Condition(s):     Assessment:   Condition Improved    Status: Discharged / Care Transfer  Permanent Disability:No    Plan:      Diagnostics:      Comments:  At this point patient is MMI for skull fracture, postconcussive syndrome, rib fractures, cervical strain, and lumbar strain.  He remains under the care of orthopedics for the left shoulder.  Released from care here at our clinic with follow-up with o  rthopedics.  It appears the anticipated full duty and MMI at next appointment  Restricted duty per orthopedics  Follow-up as needed    Disability Information   Status: Released to Restricted Duty    From:  9/30/2021  Through:   Restrictions are: Temporary   Physical Restrictions   Sitting:    Standing:    Stooping:    Bending:      Squatting:    Walking:    Climbing:    Pushing:      Pulling:    Other:    Reaching Above Shoulder (L):   Reaching Above Shoulder (R):       Reaching Below Shoulder (L):    Reaching Below Shoulder (R):      Not to exceed Weight Limits   Carrying(hrs):   Weight Limit(lb):   Lifting(hrs):   Weight  Limit(lb):     Comments: Restrictions per orthopedics    Repetitive Actions   Hands: i.e. Fine Manipulations from Grasping:     Feet: i.e. Operating Foot Controls:     Driving / Operate Machinery:     Health Care Provider’s Original or Electronic Signature  Сергей Sterling D.O. Health Care Provider’s Original or Electronic Signature    Talat Bonilla MD         Clinic Name / Location: [address] [place] [address] Clinic Phone Number: Dept: [phone]   Appointment Time: 4:15 Pm Visit Start Time: 4:08 PM   Check-In Time:  3:56 Pm Visit Discharge Time: 4:27 PM    Original-Treating Physician or Chiropractor    Page 2-Insurer/TPA    Page 3-Employer    Page 4-Employee

## 2021-09-30 NOTE — PROGRESS NOTES
"Subjective:     Nadeem Araujo is a 46 y.o. male who presents for Follow-Up (WC DOI 4/20/21 head, ribs, better, rm 17)      DOI 4/20/2021: 46-year-old male who reportedly fell 12 feet from a scaffolding onto concrete below. He struck the back of his head and left chest wall. See prior note and hospital discharge for detailed history.  Patient states that the head injury, rib injury and back Inderal improved.  He states his only issue is the left shoulder.  He states that has mostly resolved as well.  He received a corticosteroid injection and has been pretty good since.  They upped his work restrictions and he is allowed to lift up to 25 pounds.  He states that he has been working light duty without difficulty.  He states he has another appointment in a couple weeks where he may be released to full duty.    ROS         Objective:     /84   Pulse 86   Resp 18   Ht 1.778 m (5' 10\")   Wt 102 kg (225 lb)   BMI 32.28 kg/m²     Constitutional: Patient is in no acute distress. Appears well-developed and well-nourished.   Cardiovascular: Normal rate.    Pulmonary/Chest: Effort normal. No respiratory distress.   Neurological: Patient is alert and oriented to person, place, and time.   Skin: Skin is warm and dry.   Psychiatric: Normal mood and affect. Behavior is normal.     Neuro: Alert and oriented x3.  Cranial nerves grossly intact.  Cervical: No gross deformity.  Full range of motion.  Left shoulder: No gross deformity.   Essentially full range of motion.  Chest wall:   Good chest wall movement.  Good respiratory effort.  No tenderness.    Assessment/Plan:       1. Closed fracture of skull with routine healing, unspecified bone, subsequent encounter    2. Closed fracture of multiple ribs of left side with routine healing, subsequent encounter    3. Superior labrum anterior-to-posterior (SLAP) tear of left shoulder    4. Post concussive syndrome    5. Strain of lumbar region, subsequent " encounter    Released to Restricted Duty FROM 9/30/2021 TO    Restrictions per orthopedics  At this point patient is MMI for skull fracture, postconcussive syndrome, rib fractures, cervical strain, and lumbar strain.  He remains under the care of orthopedics for the left shoulder.  Released from care here at our clinic with follow-up with o  rthopedics.  It appears the anticipated full duty and MMI at next appointment  Restricted duty per orthopedics  Follow-up as needed    Differential diagnosis, natural history, supportive care, and indications for immediate follow-up discussed.    Approximately 15 minutes was spent in preparing for visit, obtaining history, exam and evaluation, patient counseling/education and post visit documentation/orders.

## 2021-11-02 ENCOUNTER — PHYSICAL THERAPY (OUTPATIENT)
Dept: PHYSICAL THERAPY | Facility: REHABILITATION | Age: 46
End: 2021-11-02
Attending: PREVENTIVE MEDICINE
Payer: COMMERCIAL

## 2021-11-02 ENCOUNTER — TELEPHONE (OUTPATIENT)
Dept: PHYSICAL THERAPY | Facility: REHABILITATION | Age: 46
End: 2021-11-02

## 2021-11-02 DIAGNOSIS — S46.012D STRAIN OF MUSCLE(S) AND TENDON(S) OF THE ROTATOR CUFF OF LEFT SHOULDER, SUBSEQUENT ENCOUNTER: ICD-10-CM

## 2021-11-02 DIAGNOSIS — S43.432A SUPERIOR LABRUM ANTERIOR-TO-POSTERIOR (SLAP) TEAR OF LEFT SHOULDER: ICD-10-CM

## 2021-11-02 PROCEDURE — 97161 PT EVAL LOW COMPLEX 20 MIN: CPT

## 2021-11-02 SDOH — ECONOMIC STABILITY: GENERAL: QUALITY OF LIFE: GOOD

## 2021-11-02 ASSESSMENT — ENCOUNTER SYMPTOMS
PAIN SCALE AT LOWEST: 0
EXACERBATED BY: ACTIVITY
ALLEVIATING FACTORS: PAIN MEDICATION
PAIN SCALE: 3
PAIN TIMING: WHEN ACTIVE
EXACERBATED BY: LIFTING
ALLEVIATING FACTORS: POSITION CHANGE
PAIN LOCATION: L SHOULDER
QUALITY: ACHING
PAIN TIMING: INTERMITTENT
ALLEVIATING FACTORS: REST
QUALITY: BURNING
EXACERBATED BY: OVERHEAD ACTIVITY

## 2021-11-02 NOTE — OP THERAPY DISCHARGE SUMMARY
Outpatient Physical Therapy  DISCHARGE SUMMARY NOTE      [facility] Therapy [place] [address]  [address]  [place] [address]  Phone:  [phone]  Fax:  [phone]    Date of Visit: 11/02/2021    Patient: Nadeem Araujo  YOB: 1975  MRN: [id number]     Referring Provider: Сергей Sterling D.O.  [address]  [place] [address]   Referring Diagnosis Closed fracture of multiple ribs of left side with routine healing, subsequent encounter [S22.42XD];Strain of left shoulder, subsequent encounter [S46.912D];Strain of lumbar region, subsequent encounter [S39.012D];Post concussive syndrome [F07.81]           Your patient is being discharged from Physical Therapy with the following comments:   · Patient to be evaluated on 11/2/21 under new referral.       Daksha Floyd, PT    Date: 11/2/2021

## 2021-11-02 NOTE — OP THERAPY EVALUATION
"  Outpatient Physical Therapy  INITIAL EVALUATION    [facility] Physical Therapy [address]  [address]  [place] [address]  Phone:  [phone]  Fax:  [phone]    Date of Evaluation: 11/02/2021    Patient: Nadeem Araujo  YOB: 1975  MRN: [id number]     Referring Provider: Сергей Sterling D.O.  [address] [facility]  [address]  [place] [address]   Referring Diagnosis Superior labrum anterior-to-posterior (SLAP) tear of left shoulder [S43.432A];Strain of left shoulder, subsequent encounter [S46.912D]     Time Calculation  Start time: 1700  Stop time: 1720 Time Calculation (min): 20 minutes         Chief Complaint: Shoulder Problem and Work-Related Injury    Visit Diagnoses     ICD-10-CM   1. Superior labrum anterior-to-posterior (SLAP) tear of left shoulder  S43.432A   2. Strain of muscle(s) and tendon(s) of the rotator cuff of left shoulder, subsequent encounter  S46.012D       Date of onset of impairment: 4/20/2021    Subjective:   History of Present Illness:     Date of onset:  4/20/2021    Mechanism of injury:  Per pt's occupational medicine visit on 9/30/21, \"4/20/2021: 46-year-old male who reportedly fell 12 feet from a scaffolding onto concrete below. He struck the back of his head and left chest wall. See prior note and hospital discharge for detailed history.  Patient states that the head injury, rib injury and back Inderal improved.  He states his only issue is the left shoulder.  He states that has mostly resolved as well.  He received a corticosteroid injection and has been pretty good since. \"    The pt was cleared for full return to work duty on 10/19/21 by [facility] physician.     The pt presents to physical therapy initial evaluation today stating that overall his shoulder pain improved following the cortisone injection. He states that he still gets some pain in the shoulder with movement, but he does have multiple days without pain. The pt has returned full duty to work " without complication, pt does have to do some lifting at work. Able to lift 50-60 lbs. Pt still gets some pain with sleeping. Pt independent with IADLs. He states that he is still not as confident in the L UE for heavy lifting and overhead tasks due to decreased use of the L arm over the last 6 months.   Quality of life:  Good  Prior level of function:  Pt previously independent with all IADLs and work tasks  Sleep disturbance:  Not disrupted  Pain:     Current pain rating:  3    At best pain ratin    Location:  L shoulder     Quality:  Aching and burning    Pain timing:  Intermittent and when active    Relieving factors:  Pain medication, rest and position change    Aggravating factors:  Activity, lifting and overhead activity    Progression:  Improving  Social Support:     Lives in:  One-story house  Hand dominance:  Right  Treatments:     Previous treatment:  Physical therapy    Current treatment:  Home exercise program  Patient Goals:     Patient goals for therapy:  Increased strength    Other patient goals:  Return to PLOF      Past Medical History:   Diagnosis Date   • Depression    • GERD (gastroesophageal reflux disease)    • Hypertension      Past Surgical History:   Procedure Laterality Date   • PTERYGIUM EXCISION Left 2019    Procedure: EXCISION, PTERYGIUM - WITH AMNIO GRAFT;  Surgeon: Jose Singh M.D.;  Location: SURGERY SAME DAY [facility];  Service: Ophthalmology   • PTERYGIUM EXCISION Right 2019    Procedure: EXCISION, PTERYGIUM WITH CONJUCTIVAL OUTOGRAFT;  Surgeon: Jose Singh M.D.;  Location: SURGERY SAME DAY [facility];  Service: Ophthalmology     Social History     Tobacco Use   • Smoking status: Never Smoker   • Smokeless tobacco: Never Used   Substance Use Topics   • Alcohol use: Yes     Comment: occasionally     Family and Occupational History     Socioeconomic History   • Marital status:      Spouse name: Not on file   • Number of children: 1   • Years of  education: Not on file   • Highest education level: Not on file   Occupational History   • Not on file       Objective     Postural Observations  Seated posture: good  Standing posture: good        Cervical Screen    Cervical range of motion within normal limits    Active Range of Motion   Left Shoulder   Flexion: 160 degrees   Abduction: 155 degrees   External rotation BTH: T1   Internal rotation BTB: T12     Right Shoulder   Normal active range of motion    Passive Range of Motion   Left Shoulder   Flexion: 165 (Mild pain) degrees   Abduction: 165 degrees   External rotation 45°: 90 degrees   Internal rotation 45°: 70 degrees     Right Shoulder   Normal passive range of motion    Strength:      Left Shoulder   Planes of Motion   Flexion: 4+   Extension: 5   Abduction: 4+   External rotation at 0°: 4+   Internal rotation at 0°: 4+   Isolated Muscles   Trapezius (lower): 4   Trapezius (middle): 4     Right Shoulder   Normal muscle strength  Isolated Muscles   Triceps: 2+     Additional Strength Details   R 110 psi   L 85 psi        Therapeutic Exercises (CPT 46904):       Therapeutic Exercise Summary: Pt educated to continue with currently prescribed HEP from his previous PT POC for current injury.       Time-based treatments/modalities:           Assessment, Response and Plan:   Impairments: abnormal or restricted ROM, difficulty performing job, impaired physical strength and lacks appropriate home exercise program    Assessment details:  Nadeem Araujo is a 46 y.o. male who presents to skilled physical therapist initial evaluation with current complaints of L shoulder pain following a work related injury on 4/20/21. The pt presents with objective impairments in L shoulder strength, end range L shoulder ROM, intermittent pain with functional and work tasks, and decreased confidence in the L UE for higher level dynamic and strength related tasks required for his job. The impairments found during today's  evaluation can be addressed by PT intervention and the pt appears motivated to participate in PT and has previously made good functional improvement with participation in PT intervention. It is anticipated that the pt will benefit from skilled physical therapy intervention to address functional limitations and impairments detailed above and to maximize his return to PLOF.   Barriers to therapy:  None  Prognosis: good    Goals:   Short Term Goals:   1. Pt will report <1/10 L shoulder pain following a full day at work  2. Pt will be able to lift 10# overhead with the L UE without an increase in pain  3. Pt will be able to sleep throughout the night without an increase in shoulder symptoms  4. Pt will demonstrate mid-trap MMT to 5/5  Short term goal time span:  2-4 weeks      Long Term Goals:    1. Pt will be able to lift 20# overhead with the L UE without an increase in symptoms  2. Pt will be able to lift 70# from the ground with B UEs to a bench without complication  3. Pt will be able to complete a full week at work with 0/10 shoulder pain  4. Pt will be independent with HEP  Long term goal time span:  6-8 weeks    Plan:   Therapy options:  Physical therapy treatment to continue  Planned therapy interventions:  Therapeutic Exercise (CPT 75774), Manual Therapy (CPT 71393) and Neuromuscular Re-education (CPT 55536)  Frequency:  2x week  Duration in weeks:  4  Duration in visits:  8  Discussed with:  Patient  Plan details:  Pt educated on their current objective clinical presentation, anticipated PT POC, and importance of adherence to prescribed HEP in order to maximize PT benefit.        Functional Assessment Used  Quickdash General Total Score: 34.09     Referring provider co-signature:  I have reviewed this plan of care and my co-signature certifies the need for services.    Certification Period: 11/02/2021 to  12/07/21    Physician Signature: ________________________________ Date: ______________

## 2021-11-04 ENCOUNTER — PHYSICAL THERAPY (OUTPATIENT)
Dept: PHYSICAL THERAPY | Facility: REHABILITATION | Age: 46
End: 2021-11-04
Attending: PREVENTIVE MEDICINE
Payer: COMMERCIAL

## 2021-11-04 DIAGNOSIS — S46.012D STRAIN OF MUSCLE(S) AND TENDON(S) OF THE ROTATOR CUFF OF LEFT SHOULDER, SUBSEQUENT ENCOUNTER: ICD-10-CM

## 2021-11-04 DIAGNOSIS — S43.432A SUPERIOR LABRUM ANTERIOR-TO-POSTERIOR (SLAP) TEAR OF LEFT SHOULDER: ICD-10-CM

## 2021-11-04 PROCEDURE — 97110 THERAPEUTIC EXERCISES: CPT

## 2021-11-04 NOTE — OP THERAPY DAILY TREATMENT
Outpatient Physical Therapy  DAILY TREATMENT     [facility] Physical Therapy [address].  [address]  [place] [address]  Phone:  [phone]  Fax:  [phone]    Date: 11/04/2021    Patient: Nadeem Araujo  YOB: 1975  MRN: [id number]     Time Calculation    Start time: 0815              Chief Complaint: Shoulder Problem and Work-Related Injury    Visit #: 9    SUBJECTIVE:  ***    OBJECTIVE:  Current objective measures: ***          Therapeutic Exercises (CPT 23887):     1. UBE, L3, 6 minutes      Therapeutic Exercise Summary: Pt educated to continue with currently prescribed HEP from his previous PT POC for current injury.       Time-based treatments/modalities:       ASSESSMENT:   Response to treatment: ***    PLAN/RECOMMENDATIONS:   Plan for treatment: therapy treatment to continue next visit.  Planned interventions for next visit: continue with current treatment.

## 2021-11-04 NOTE — OP THERAPY DAILY TREATMENT
Outpatient Physical Therapy  DAILY TREATMENT     [address]  [address]  [place] [address]  Phone:  [phone]  Fax:  [phone]    Date: 11/04/2021    Patient: Nadeem Araujo  YOB: 1975  MRN: [id number]     Time Calculation    Start time: 0815  Stop time: 0853 Time Calculation (min): 38 minutes         Chief Complaint: Shoulder Problem and Work-Related Injury    Visit #: 2    SUBJECTIVE:  The pt states that his shoulder is feeling good today and he is agreeable to PT. He has no new complaints or changes to report.     OBJECTIVE:          Therapeutic Exercises (CPT 93928):     1. UBE, L3, 6 minutes    2. T-band ER, 3x10, Black t-band    3. T-band IR, 3x10, Black t-band    4. T-band ER at 90/90, 2x10, Black t-band    5. Cable rows, 3x10, 20#    6. Cable pulldowns, 3x10, 25#    7. Dumbell bicep curl, 3x10, 6#    8. Dumbell overhead press, 3x10, 6#    9. Prone Houghston's x4, 2x10, 2#     10. Sidelying GH ER, 3x10, 3#    11. Supine wand flexion, 10x    12. Pulleys, 2 minutes      Therapeutic Exercise Summary: Pt educated to continue with currently prescribed HEP.       Time-based treatments/modalities:    Physical Therapy Timed Treatment Charges  Therapeutic exercise minutes (CPT 98112): 38 minutes    ASSESSMENT:   Response to treatment: The pt tolerated a progression in therex for shoulder strengthening and stabilization with reports of fatigue but no worsening of pain. He required occasional cueing for performance of exercises, but overall demonstrated good symmetry of movement. The pt will continue to benefit from skilled physical therapy intervention to maximize his return to PLOF.     PLAN/RECOMMENDATIONS:   Plan for treatment: therapy treatment to continue next visit.  Planned interventions for next visit: continue with current treatment. Continue with progression of shoulder strengthening and stabilization program.

## 2021-11-09 ENCOUNTER — PHYSICAL THERAPY (OUTPATIENT)
Dept: PHYSICAL THERAPY | Facility: REHABILITATION | Age: 46
End: 2021-11-09
Attending: PREVENTIVE MEDICINE
Payer: COMMERCIAL

## 2021-11-09 DIAGNOSIS — S43.432A SUPERIOR LABRUM ANTERIOR-TO-POSTERIOR (SLAP) TEAR OF LEFT SHOULDER: ICD-10-CM

## 2021-11-09 PROCEDURE — 97110 THERAPEUTIC EXERCISES: CPT

## 2021-11-09 NOTE — OP THERAPY DAILY TREATMENT
Outpatient Physical Therapy  DAILY TREATMENT     [facility] Physical [address]  [address]  [place] [address]  Phone:  [phone]  Fax:  [phone]    Date: 11/09/2021    Patient: Nadeem Araujo  YOB: 1975  MRN: [id number]     Time Calculation    Start time: 0815  Stop time: 0857 Time Calculation (min): 42 minutes         Chief Complaint: Shoulder Injury    Visit #: 3/8    SUBJECTIVE:  Patient reports that overall his pain in the L shoulder is inconsistent. Has had 3-4 days without pain. Inconsistently lifting overhead and out to the side can cause pain.     OBJECTIVE:  Current objective measures:           Therapeutic Exercises (CPT 01542):     1. UBE, L3, 6 minutes    2. Prone over ball - Ts, 2 x 15    3. Prone over ball - Ys, 2 x 15     4. Prone over ball - 90/90 ER, 2 x 15 , Caused fatigue and required multiple rest breaks     5. LUE Rebounder toss from 90/90 ER position , 2 x 15, 4# ball    6. Table Push ups , 2 x 10 , First trialed from larger black box, but caused 1/10 pain.     7. Plank on BOSU , 2 x 30 seconds     8. Overhead press , 2 x 10, 6# DB    9. Pulley rows, 2 x 10, 30#    10. Body Blade at 90 degrees shoulder flexion , 2 x 30 seconds , Small yellow blade       Time-based treatments/modalities:    Physical Therapy Timed Treatment Charges  Therapeutic exercise minutes (CPT 80188): 42 minutes      ASSESSMENT:   Response to treatment: Patient demonstrated impaired muscular endurance during today's treatment session, but tolerated high level, challenging shoulder exercises without increase in pain.     PLAN/RECOMMENDATIONS:   Plan for treatment: therapy treatment to continue next visit.  Planned interventions for next visit: continue with current treatment.

## 2021-11-17 ENCOUNTER — PHYSICAL THERAPY (OUTPATIENT)
Dept: PHYSICAL THERAPY | Facility: REHABILITATION | Age: 46
End: 2021-11-17
Attending: PREVENTIVE MEDICINE
Payer: COMMERCIAL

## 2021-11-17 DIAGNOSIS — S43.432A SUPERIOR LABRUM ANTERIOR-TO-POSTERIOR (SLAP) TEAR OF LEFT SHOULDER: ICD-10-CM

## 2021-11-17 DIAGNOSIS — S46.012D STRAIN OF MUSCLE(S) AND TENDON(S) OF THE ROTATOR CUFF OF LEFT SHOULDER, SUBSEQUENT ENCOUNTER: ICD-10-CM

## 2021-11-17 PROCEDURE — 97110 THERAPEUTIC EXERCISES: CPT

## 2021-11-17 NOTE — OP THERAPY DAILY TREATMENT
"  Outpatient Physical Therapy  DAILY TREATMENT     [facility] Physical Therapy [address].  [address]  [place] [address]  Phone:  [phone]  Fax:  [phone]    Date: 11/17/2021    Patient: Nadeem Araujo  YOB: 1975  MRN: [id number]     Time Calculation    Start time: 1446  Stop time: 1524 Time Calculation (min): 38 minutes         Chief Complaint: Shoulder Problem    Visit #: 4    SUBJECTIVE:  The pt states that he still gets shoulder pain with certain activities, particularly lifting overhead at work. He states that he continues with participation in HEP for shoulder strengthening and mobility. He is agreeable to PT today.     OBJECTIVE:          Therapeutic Exercises (CPT 55213):     1. UBE, L4, 6 minutes    2. Prone over ball - Ts, 2 x 15    3. Prone over ball - Ys, 2 x 15     4. Prone over ball - Ws, 2 x 15     5. LUE Rebounder toss from 90/90 ER position , 2x15    6. Table Push ups , 2x10    7. Plank on BOSU , 2x30\"    8. Overhead press , 2x10 6#    9. Cable rows, Not today    10. Body Blade at 90 degrees shoulder flexion , Not today, Small yellow blade     11. Ball flexion AAROM, 30x    12. Side-lying shoulder ER, 3x10, 3# dumbbell    13. 1/2 foam alternating flexion, 10x each     14. 1/2 foam angels, 20x    15. 1/2 foam goal posts, 20x    20. POC: 11/02/2021 to  12/07/21      Therapeutic Exercise Summary: Pt educated to continue with currently prescribed HEP      Time-based treatments/modalities:    Physical Therapy Timed Treatment Charges  Therapeutic exercise minutes (CPT 21707): 38 minutes    ASSESSMENT:   Response to treatment: The pt tolerated a progressive increase in therex for shoulder strengthening and stabilization with appropriate fatigue levels but no worsening of pain. He is making steady progress towards his PT goals.     PLAN/RECOMMENDATIONS:   Plan for treatment: therapy treatment to continue next visit.  Planned interventions for next visit: continue " with current treatment. Continue with progression of shoulder strength and stability as tolerated.

## 2021-11-19 ENCOUNTER — PHYSICAL THERAPY (OUTPATIENT)
Dept: PHYSICAL THERAPY | Facility: REHABILITATION | Age: 46
End: 2021-11-19
Attending: PREVENTIVE MEDICINE
Payer: COMMERCIAL

## 2021-11-19 DIAGNOSIS — S43.432A SUPERIOR LABRUM ANTERIOR-TO-POSTERIOR (SLAP) TEAR OF LEFT SHOULDER: ICD-10-CM

## 2021-11-19 PROCEDURE — 97110 THERAPEUTIC EXERCISES: CPT

## 2021-11-19 NOTE — OP THERAPY DAILY TREATMENT
Outpatient Physical Therapy  DAILY TREATMENT     [facility] Physical Therapy [address].  [address]  [place] [address]  Phone:  [phone]  Fax:  [phone]    Date: 11/19/2021    Patient: Nadeem Araujo  YOB: 1975  MRN: [id number]     Time Calculation    Start time: 0900  Stop time: 0945 Time Calculation (min): 45 minutes     Chief Complaint: No chief complaint on file.    Visit #: 10    SUBJECTIVE:  Unsure of pattern, certain movements hurt, when I use it more it hurts, but overall physical therapy is improving my symptoms, my shoulder is better than it was .     OBJECTIVE:  End range pain with flexion and ABD, improved with program per primary therapist, likely due to warm up effect.          Therapeutic Exercises (CPT 50727):     1. UBE, L4, 6 minutes    2. Prone over ball - Ts, 15x    3. Prone over ball - Ys, 15x, bent elbow     4. Prone over ball - Ws, 15x, unilateral for technique    5. LUE Rebounder toss from 90/90 ER position , no today    6. Tall Planks, 30 seconds    7. Shoulder Taps, 3 x 10    8. Overhead press , 10x normal, 3 x 8 5#, Pink TB rythmic stab    9. U. Cable rows, 3 x 10, 30, 40, 50# cued to decrease shrug     10. Body Blade at 90 degrees shoulder flexion , Not today, Small yellow blade     11. Ball flexion AAROM, not today    12. Side-lying shoulder ER, 3x10, 3# dumbbell    13. 1/2 foam alternating flexion, not today    14. 1/2 foam angels, not today    15. 1/2 foam goal posts, not today    16. Face Pulls , 2 x 15, 10# cued for cristi elbows     20. POC: 11/02/2021 to  12/07/21      Therapeutic Exercise Summary:   Pt educated to continue with currently prescribed HEP      Time-based treatments/modalities:    Physical Therapy Timed Treatment Charges  Therapeutic exercise minutes (CPT 50562): 45 minutes      ASSESSMENT:   Continued plan per primary therapist.  Added > 90 degree Rotator cuff activation and cued reduced shrug for > 90 degree activities.   Overall good tolerance to activity, no worsening pain throughout.  Improved ROM post.     Fatigue post session today - 5/10 (reports maybe slightly more tired than usual PT, denies pain)     PLAN/RECOMMENDATIONS:   Plan for treatment: therapy treatment to continue next visit.  Planned interventions for next visit: continue with current treatment.

## 2021-11-19 NOTE — OP THERAPY DAILY TREATMENT
"  Outpatient Physical Therapy  DAILY TREATMENT     [facility] Physical [address].  [address]  [place] [address]  Phone:  [phone]  Fax:  [phone]    Date: 11/19/2021    Patient: Nadeem Araujo  YOB: 1975  MRN: [id number]     Time Calculation                   Chief Complaint: No chief complaint on file.    Visit #: 10    SUBJECTIVE:  ***    OBJECTIVE:  Current objective measures: ***          Therapeutic Exercises (CPT 79213):     1. UBE, L4, 6 minutes    2. Prone over ball - Ts, 2 x 15    3. Prone over ball - Ys, 2 x 15     4. Prone over ball - Ws, 2 x 15     5. LUE Rebounder toss from 90/90 ER position , 2x15    6. Table Push ups , 2x10    7. Plank on BOSU , 2x30\"    8. Overhead press , 2x10 6#    9. Cable rows, Not today    10. Body Blade at 90 degrees shoulder flexion , Not today, Small yellow blade     11. Ball flexion AAROM, 30x    12. Side-lying shoulder ER, 3x10, 3# dumbbell    13. 1/2 foam alternating flexion, 10x each     14. 1/2 foam angels, 20x    15. 1/2 foam goal posts, 20x    20. POC: 11/02/2021 to  12/07/21      Therapeutic Exercise Summary: Pt educated to continue with currently prescribed HEP      Time-based treatments/modalities:           Pain rating (1-10) before treatment:  {PAIN NUMBERS_1-10:61073}  Pain rating (1-10) after treatment:  {PAIN NUMBERS_1-10:88394}    ASSESSMENT:   Response to treatment: ***    PLAN/RECOMMENDATIONS:   Plan for treatment: {AMB OP THERAPY - THERAPY PLAN:045308843::\"therapy treatment to continue next visit\"}.  Planned interventions for next visit: {PT PLANNED THERAPY INTERVENTIONS:136933644::\"continue with current treatment\"}.       "

## 2021-11-22 ENCOUNTER — PHYSICAL THERAPY (OUTPATIENT)
Dept: PHYSICAL THERAPY | Facility: REHABILITATION | Age: 46
End: 2021-11-22
Attending: PREVENTIVE MEDICINE
Payer: COMMERCIAL

## 2021-11-22 DIAGNOSIS — S43.432A SUPERIOR LABRUM ANTERIOR-TO-POSTERIOR (SLAP) TEAR OF LEFT SHOULDER: ICD-10-CM

## 2021-11-22 DIAGNOSIS — S46.012D STRAIN OF MUSCLE(S) AND TENDON(S) OF THE ROTATOR CUFF OF LEFT SHOULDER, SUBSEQUENT ENCOUNTER: ICD-10-CM

## 2021-11-22 PROCEDURE — 97110 THERAPEUTIC EXERCISES: CPT

## 2021-11-22 NOTE — OP THERAPY DAILY TREATMENT
"  Outpatient Physical Therapy  DAILY TREATMENT     [facility] Physical [address].  [address]  [place] [address]  Phone:  [phone]  Fax:  [phone]    Date: 11/22/2021    Patient: Nadeem Araujo  YOB: 1975  MRN: [id number]     Time Calculation    Start time: 0815  Stop time: 0853 Time Calculation (min): 38 minutes         Chief Complaint: Shoulder Problem    Visit #: 11    SUBJECTIVE:  The pt states that his shoulder pain continues to improve, he only experiences fatigue now in his L shoulder. He is agreeable to PT today.     OBJECTIVE:          Therapeutic Exercises (CPT 83586):     1. UBE, L4, 6 minutes    2. Prone over ball - Ts, 2 x 15, 2# dumbbells    3. Prone over ball - Ys, 2 x 15 , 2# dumbbells    4. Prone over ball - Ws, 2 x 15 , 2# dumbbells    5. LUE Rebounder toss from 90/90 ER position , Not today    6. Knee push ups, 2x10    7. Plank on BOSU , 2x30\"    8. Overhead press , Not today    9. Cable rows, 3x10, 40#    10. Body Blade at 90 degrees shoulder flexion , Not today, Small yellow blade     11. Ball flexion AAROM, 15x    12. Side-lying shoulder ER, 3x10, 3# dumbbell    13. 1/2 foam alternating flexion, 15x each     14. 1/2 foam angels, 20x    15. 1/2 foam goal posts, 20x    16. Cross body stretch, 2x30\" each     20. POC: 11/02/2021 to  12/07/21      Therapeutic Exercise Summary: Pt educated to continue with currently prescribed HEP      Time-based treatments/modalities:    Physical Therapy Timed Treatment Charges  Therapeutic exercise minutes (CPT 35023): 38 minutes      ASSESSMENT:   Response to treatment: The pt continues to tolerate a progression in therex for shoulder strength and stabilization without adverse effects. He is making steady improvements in his activity tolerance and symmetry of strength. The pt is making good progress towards PT goals. Anticipate discharge from skilled therapy within 1 week.     PLAN/RECOMMENDATIONS:   Plan for " treatment: therapy treatment to continue next visit.  Planned interventions for next visit: continue with current treatment. Continue with progression of shoulder strength and stability, review HEP in preparation for upcoming discharge.

## 2021-11-24 ENCOUNTER — PHYSICAL THERAPY (OUTPATIENT)
Dept: PHYSICAL THERAPY | Facility: REHABILITATION | Age: 46
End: 2021-11-24
Attending: PREVENTIVE MEDICINE
Payer: COMMERCIAL

## 2021-11-24 DIAGNOSIS — S46.012D STRAIN OF MUSCLE(S) AND TENDON(S) OF THE ROTATOR CUFF OF LEFT SHOULDER, SUBSEQUENT ENCOUNTER: ICD-10-CM

## 2021-11-24 DIAGNOSIS — S43.432A SUPERIOR LABRUM ANTERIOR-TO-POSTERIOR (SLAP) TEAR OF LEFT SHOULDER: ICD-10-CM

## 2021-11-24 PROCEDURE — 97110 THERAPEUTIC EXERCISES: CPT

## 2021-11-24 NOTE — OP THERAPY DAILY TREATMENT
"  Outpatient Physical Therapy  DAILY TREATMENT     [facility] Physical [address]  [address]  [place] [address]  Phone:  [phone]  Fax:  [phone]    Date: 11/24/2021    Patient: Nadeem Araujo  YOB: 1975  MRN: [id number]     Time Calculation    Start time: 0815  Stop time: 0853 Time Calculation (min): 38 minutes         Chief Complaint: Shoulder Problem    Visit #: 12    SUBJECTIVE:  The pt states that his shoulder is feeling good today. He reports ongoing improvement in his shoulder strength and function. He is agreeable to PT today.     OBJECTIVE:        Therapeutic Exercises (CPT 88873):     1. UBE, L4, 6 minutes    2. Prone over ball - Ts, 2x15, 3# dumbbells    3. Prone over ball - Ys, 2x15, 3# dumbbells    4. Prone over ball - Ws, 2x15, 3# dumbbells    5. LUE Rebounder toss from 90/90 ER position , 20x    6. Knee push ups, 2x10    7. Knee plank shoulder taps, 20x    8. Overhead press , Not today    9. Cable rows, 3x10, 40#    10. Body Blade at 90 degrees shoulder flexion , Not today, Small yellow blade     11. Ball flexion AAROM, 20x    12. Side-lying shoulder ER, 3x10, 3# dumbbell    13. 1/2 foam alternating flexion, 15x each     14. 1/2 foam angels, 20x    15. 1/2 foam goal posts, 20x    16. Cross body stretch, 3x20\"    20. POC: 11/02/2021 to  12/07/21      Therapeutic Exercise Summary: Pt educated to continue with currently prescribed HEP.      Time-based treatments/modalities:    Physical Therapy Timed Treatment Charges  Therapeutic exercise minutes (CPT 40174): 38 minutes    ASSESSMENT:   Response to treatment: The pt continues to tolerate a progressive increase in therex for shoulder strengthening and stabilization with appropriate fatigue and no worsening of pain. He is making steady progress towards goals with good self-efficacy with HEP. Anticipate discharge from skilled therapy after next visit.     PLAN/RECOMMENDATIONS:   Plan for treatment: therapy " treatment to continue next visit.  Planned interventions for next visit: continue with current treatment. Anticipate transition to independent HEP after next visit.

## 2021-11-29 ENCOUNTER — PHYSICAL THERAPY (OUTPATIENT)
Dept: PHYSICAL THERAPY | Facility: REHABILITATION | Age: 46
End: 2021-11-29
Attending: PREVENTIVE MEDICINE
Payer: COMMERCIAL

## 2021-11-29 DIAGNOSIS — S43.432A SUPERIOR LABRUM ANTERIOR-TO-POSTERIOR (SLAP) TEAR OF LEFT SHOULDER: ICD-10-CM

## 2021-11-29 PROCEDURE — 97110 THERAPEUTIC EXERCISES: CPT

## 2021-11-29 NOTE — OP THERAPY DISCHARGE SUMMARY
Outpatient Physical Therapy  DISCHARGE SUMMARY NOTE      [address]  [address]  [place] [address]  Phone:  [phone]  Fax:  [phone]    Date of Visit: 11/29/2021    Patient: Nadeem Araujo  YOB: 1975  MRN: [id number]     Referring Provider: Сергей Sterling D.O.  [address] [facility] [address]  [place] [address]   Referring Diagnosis Superior glenoid labrum lesion of left shoulder, initial encounter [S43.432A];Strain of unspecified muscle, fascia and tendon at shoulder and upper arm level, left arm, subsequent encounter [S46.836P]         Functional Assessment Used  Quickdash General Total Score: 6.82     Your patient is being discharged from Physical Therapy with the following comments:   · Goals met    Comments:  Nadeem Araujo has been seen for 8 PT visits since the start of care for a SLAP lesion of the L shoulder. Since the start of care he has made objective improvements in his shoulder pain, shoulder strength and stability, sleeping tolerance, working tolerance, and independence with HEP. He has met 100% of his PT goals and expressed good independence with HEP. Based on the pt's improvements since the start of care and independence with HEP he will benefit from discharge from skilled therapy at this time.    Limitations Remaining:  Short Term Goals:   1. Pt will report <1/10 L shoulder pain following a full day at work (MET)  2. Pt will be able to lift 10# overhead with the L UE without an increase in pain (MET)  3. Pt will be able to sleep throughout the night without an increase in shoulder symptoms (MET)  4. Pt will demonstrate mid-trap MMT to 5/5 (MET)  Short term goal time span:  2-4 weeks      Long Term Goals:    1. Pt will be able to lift 20# overhead with the L UE without an increase in symptoms (MET)  2. Pt will be able to lift 70# from the ground with B UEs to a bench without complication (MET)  3. Pt will be able to complete a  full week at work with 0/10 shoulder pain (MET)  4. Pt will be independent with HEP (MET)    Recommendations:  It is recommended that the pt continue with currently prescribed HEP to maintain functional gains made during PT intervention. If symptoms worsen, recommend that pt request a new PT referral.     Daksha Floyd, PT    Date: 11/29/2021

## 2021-11-29 NOTE — OP THERAPY DAILY TREATMENT
"  Outpatient Physical Therapy  DAILY TREATMENT     [facility] [address].  [address]  [place] [address]  Phone:  [phone]  Fax:  [phone]    Date: 11/29/2021    Patient: Nadeem Araujo  YOB: 1975  MRN: [id number]     Time Calculation    Start time: 1615  Stop time: 1653 Time Calculation (min): 38 minutes         Chief Complaint: Shoulder Problem and Work-Related Injury    Visit #: 13    SUBJECTIVE:  The pt reports overall improvement in his shoulder strength, pain, and tolerance to work and functional activities. He states that his shoulder still at times feels tired, but otherwise he is without significant reports of pain. He is confident in transition to independent HEP after today's visit.     OBJECTIVE:  Current objective measures: See discharge summary  Quickdash General Total Score: 6.82       Therapeutic Exercises (CPT 25831):     1. UBE, L4, 6 minutes    2. Prone over ball - Ts, 2x15, 3# dumbbells    3. Prone over ball - Ys, 2x15    4. Prone over ball - Ws, 2x15, 3# dumbbells    5. Cross body stretch, 3x20\"    6. Knee push ups, 2x10    7. Knee plank shoulder taps, 10x    8. 1/2 foam alternating flexion, 15x each     9. 1/2 foam angels, 20x    10. 1/2 foam goal posts, 20x    11. Ball flexion AAROM, 15x    12. Side-lying shoulder ER, 3x10, 3# dumbbell    20. POC: 11/02/2021 to  12/07/21      Therapeutic Exercise Summary: Pt educated to continue with currently prescribed HEP.      Time-based treatments/modalities:    Physical Therapy Timed Treatment Charges  Therapeutic exercise minutes (CPT 12859): 38 minutes    ASSESSMENT:   Response to treatment: See discharge summary    PLAN/RECOMMENDATIONS:   Plan for treatment: discharge patient due to accomplished goals.  Planned interventions for next visit: Pt to transition to independent HEP after today's visit.       "

## 2021-12-22 ENCOUNTER — OFFICE VISIT (OUTPATIENT)
Dept: MEDICAL GROUP | Facility: PHYSICIAN GROUP | Age: 46
End: 2021-12-22
Payer: COMMERCIAL

## 2021-12-22 VITALS
DIASTOLIC BLOOD PRESSURE: 80 MMHG | WEIGHT: 220 LBS | HEART RATE: 77 BPM | BODY MASS INDEX: 31.5 KG/M2 | TEMPERATURE: 98 F | OXYGEN SATURATION: 97 % | SYSTOLIC BLOOD PRESSURE: 118 MMHG | HEIGHT: 70 IN

## 2021-12-22 DIAGNOSIS — E66.09 CLASS 1 OBESITY DUE TO EXCESS CALORIES WITHOUT SERIOUS COMORBIDITY WITH BODY MASS INDEX (BMI) OF 31.0 TO 31.9 IN ADULT: ICD-10-CM

## 2021-12-22 DIAGNOSIS — I10 ESSENTIAL HYPERTENSION: ICD-10-CM

## 2021-12-22 DIAGNOSIS — Z00.00 ROUTINE HEALTH MAINTENANCE: ICD-10-CM

## 2021-12-22 DIAGNOSIS — S02.91XD CLOSED FRACTURE OF SKULL WITH ROUTINE HEALING, UNSPECIFIED BONE, SUBSEQUENT ENCOUNTER: ICD-10-CM

## 2021-12-22 DIAGNOSIS — Z23 NEED FOR VACCINATION: ICD-10-CM

## 2021-12-22 DIAGNOSIS — S01.01XA SCALP LACERATION, INITIAL ENCOUNTER: ICD-10-CM

## 2021-12-22 DIAGNOSIS — K21.9 GASTROESOPHAGEAL REFLUX DISEASE WITHOUT ESOPHAGITIS: ICD-10-CM

## 2021-12-22 DIAGNOSIS — E78.2 MIXED HYPERLIPIDEMIA: ICD-10-CM

## 2021-12-22 DIAGNOSIS — R33.9 URINARY RETENTION: ICD-10-CM

## 2021-12-22 DIAGNOSIS — Z00.00 ENCOUNTER FOR WELL ADULT EXAM WITHOUT ABNORMAL FINDINGS: ICD-10-CM

## 2021-12-22 DIAGNOSIS — J98.2 PNEUMOMEDIASTINUM (HCC): ICD-10-CM

## 2021-12-22 DIAGNOSIS — S27.0XXD TRAUMATIC PNEUMOTHORAX, SUBSEQUENT ENCOUNTER: ICD-10-CM

## 2021-12-22 PROBLEM — T14.90XA TRAUMA: Status: RESOLVED | Noted: 2021-04-20 | Resolved: 2021-12-22

## 2021-12-22 PROBLEM — S02.91XA SKULL FRACTURE (HCC): Status: RESOLVED | Noted: 2021-04-20 | Resolved: 2021-12-22

## 2021-12-22 PROBLEM — Z11.9 SCREENING EXAMINATION FOR INFECTIOUS DISEASE: Status: RESOLVED | Noted: 2021-04-20 | Resolved: 2021-12-22

## 2021-12-22 PROBLEM — S22.42XA CLOSED FRACTURE OF MULTIPLE RIBS OF LEFT SIDE: Status: RESOLVED | Noted: 2021-04-20 | Resolved: 2021-12-22

## 2021-12-22 PROBLEM — Z78.9 NO CONTRAINDICATION TO DEEP VEIN THROMBOSIS (DVT) PROPHYLAXIS: Status: RESOLVED | Noted: 2021-04-20 | Resolved: 2021-12-22

## 2021-12-22 PROBLEM — S27.0XXA TRAUMATIC PNEUMOTHORAX: Status: RESOLVED | Noted: 2021-04-20 | Resolved: 2021-12-22

## 2021-12-22 PROCEDURE — 90686 IIV4 VACC NO PRSV 0.5 ML IM: CPT | Performed by: NURSE PRACTITIONER

## 2021-12-22 PROCEDURE — 90471 IMMUNIZATION ADMIN: CPT | Performed by: NURSE PRACTITIONER

## 2021-12-22 PROCEDURE — 99396 PREV VISIT EST AGE 40-64: CPT | Mod: 25 | Performed by: NURSE PRACTITIONER

## 2021-12-22 ASSESSMENT — FIBROSIS 4 INDEX: FIB4 SCORE: 1.47

## 2021-12-22 NOTE — ASSESSMENT & PLAN NOTE
Chronic, ongoing.  Continues omeprazole 20 mg daily, denies side effects of the medication.  Medication was started around 2019.  Continues to avoid triggers including coffee and spicy foods. Denies cough, hoarseness, globus sensation, shortness of breath, sore throat, early satiety, unintentional weight loss, choking, dysphagia, persistent burning pain in chest or upper abdomen, nausea, vomiting, melena.

## 2021-12-22 NOTE — ASSESSMENT & PLAN NOTE
Chronic, ongoing.  Continues amlodipine 10 mg daily, denies side effects of the medication.  Reports that he is occasionally monitoring his blood pressure when out and grocery stores, states that it is usually around the 120/80 range. The patient denies chest pain, shortness of breath, headaches, dizziness, blurry vision, or dyspnea on exertion.

## 2021-12-22 NOTE — ASSESSMENT & PLAN NOTE
Chronic, ongoing.  Patient was unable to be physically active for 3 months after an accident in April 2021.  He has lost 7 pounds since September 2021.  He does not exercise and only physical activity is through his job.  Reports that his diet is high in carbohydrates.  Due for annual labs in December 2022.

## 2021-12-22 NOTE — ASSESSMENT & PLAN NOTE
Chronic, ongoing.  Most recent lipid panel completed: Total cholesterol 205, triglycerides 81, HDL 41, .  Patient is not on medication for this issue.  Reports that he is not exercising and had some weight gain after injury in April 2021.  He is physically active at his job, but does not exercise otherwise.  Reports that his diet is high in carbohydrates.  Due for annual labs in December 2022.

## 2021-12-22 NOTE — PROGRESS NOTES
Subjective:     CC:   Chief Complaint   Patient presents with   • Annual Exam   • Lab Results     HPI:   Nadeem Araujo is a 46 y.o. male who presents for annual exam    Mixed hyperlipidemia  Chronic, ongoing.  Most recent lipid panel completed: Total cholesterol 205, triglycerides 81, HDL 41, .  Patient is not on medication for this issue.  Reports that he is not exercising and had some weight gain after injury in April 2021.  He is physically active at his job, but does not exercise otherwise.  Reports that his diet is high in carbohydrates.  Due for annual labs in December 2022.    Class 1 obesity without serious comorbidity with body mass index (BMI) of 31.0 to 31.9 in adult  Chronic, ongoing.  Patient was unable to be physically active for 3 months after an accident in April 2021.  He has lost 7 pounds since September 2021.  He does not exercise and only physical activity is through his job.  Reports that his diet is high in carbohydrates.  Due for annual labs in December 2022.    Essential hypertension  Chronic, ongoing.  Continues amlodipine 10 mg daily, denies side effects of the medication.  Reports that he is occasionally monitoring his blood pressure when out and grocery stores, states that it is usually around the 120/80 range. The patient denies chest pain, shortness of breath, headaches, dizziness, blurry vision, or dyspnea on exertion.    Gastroesophageal reflux disease without esophagitis  Chronic, ongoing.  Continues omeprazole 20 mg daily, denies side effects of the medication.  Medication was started around 2019.  Continues to avoid triggers including coffee and spicy foods. Denies cough, hoarseness, globus sensation, shortness of breath, sore throat, early satiety, unintentional weight loss, choking, dysphagia, persistent burning pain in chest or upper abdomen, nausea, vomiting, melena.     Pneumomediastinum (HCC)  Resolved.    Scalp laceration, initial encounter  Resolved.    Skull  fracture (HCC)  Resolved.    Traumatic pneumothorax  Resolved.    Urinary retention  Resolved.     Last Colorectal Cancer Screening: NA  Last Tdap: 8/29/2016  Received HPV series: Aged out  Hx STDs: No    Exercise: None other than physical activity at work.   Diet: Poor, high in carbs.      He  has a past medical history of Closed fracture of multiple ribs of left side (4/20/2021), Depression, GERD (gastroesophageal reflux disease), Hypertension, Pneumomediastinum (HCC) (4/20/2021), Scalp laceration, initial encounter (4/20/2021), Skull fracture (HCC) (4/20/2021), Traumatic pneumothorax (4/20/2021), and Urinary retention (4/20/2021).  He  has a past surgical history that includes pterygium excision (Right, 4/23/2019) and pterygium excision (Left, 9/24/2019).    Family History   Problem Relation Age of Onset   • Hypertension Mother    • Diabetes Father    • No Known Problems Sister    • No Known Problems Brother    • No Known Problems Brother    • No Known Problems Sister    • No Known Problems Maternal Grandmother    • No Known Problems Maternal Grandfather    • No Known Problems Paternal Grandmother    • No Known Problems Paternal Grandfather      Social History     Tobacco Use   • Smoking status: Never Smoker   • Smokeless tobacco: Never Used   Vaping Use   • Vaping Use: Never used   Substance Use Topics   • Alcohol use: Yes     Comment: occasionally   • Drug use: Never     He  reports previously being sexually active and has had partner(s) who are female.    Patient Active Problem List    Diagnosis Date Noted   • Superior glenoid labrum lesion of left shoulder 09/16/2021   • Class 1 obesity without serious comorbidity with body mass index (BMI) of 31.0 to 31.9 in adult 04/05/2021   • Mixed hyperlipidemia 01/02/2019   • Essential hypertension 11/27/2018   • Gastroesophageal reflux disease without esophagitis 11/20/2018     Current Outpatient Medications   Medication Sig Dispense Refill   • amLODIPine (NORVASC) 10  "MG Tab Take 1 tablet by mouth every day. 90 tablet 3   • omeprazole (PRILOSEC) 20 MG delayed-release capsule Take 1 capsule by mouth every day. 90 capsule 3     No current facility-administered medications for this visit.     No Known Allergies    Review of Systems   Constitutional: Negative for fever, chills and malaise/fatigue.   HENT: Negative for congestion.    Eyes: Negative for pain.   Respiratory: Negative for cough and shortness of breath.    Cardiovascular: Negative for chest pain and leg swelling.   Gastrointestinal: Negative for nausea, vomiting, abdominal pain and diarrhea.   Genitourinary: Negative for dysuria and hematuria.   Skin: Negative for rash.   Neurological: Negative for dizziness, focal weakness and headaches.   Endo/Heme/Allergies: Does not bruise/bleed easily.   Psychiatric/Behavioral: Negative for depression.  The patient is not nervous/anxious.      Objective:   /80 (BP Location: Left arm, Patient Position: Sitting, BP Cuff Size: Adult)   Pulse 77   Temp 36.7 °C (98 °F) (Temporal)   Ht 1.778 m (5' 10\")   Wt 99.8 kg (220 lb)   SpO2 97%   BMI 31.57 kg/m²      Wt Readings from Last 4 Encounters:   12/22/21 99.8 kg (220 lb)   09/30/21 102 kg (225 lb)   09/16/21 103 kg (227 lb)   09/02/21 103 kg (227 lb)     Physical Exam:  Constitutional: Well-developed and well-nourished. Not diaphoretic. No distress.   Skin: Skin is warm and dry. No rash noted.  Head: Atraumatic without lesions.  Eyes: Conjunctivae and extraocular motions are normal. Pupils are equal, round, and reactive to light. No scleral icterus.   Ears:  External ears unremarkable. Tympanic membranes clear and intact.  Nose: Nares patent. Septum midline. Turbinates without erythema nor edema. No discharge.   Mouth/Throat: Tongue normal. Oropharynx is clear and moist. Posterior pharynx without erythema or exudates.  Neck: Supple, trachea midline. Normal range of motion. No thyromegaly present. No lymphadenopathy--cervical or " supraclavicular.  Cardiovascular: Regular rate and rhythm, S1 and S2 without murmur, rubs, or gallops.    Respiratory: Effort normal. Clear to auscultation throughout. No adventitious sounds.   Abdomen: Soft, non tender, and without distention. Active bowel sounds in all four quadrants. No rebound, guarding.  Extremities: No cyanosis, clubbing, erythema, nor edema. Radial pulses intact and symmetric.   Musculoskeletal: All major joints AROM full in all directions without pain.  Neurological: Alert and oriented x 3. Grossly non-focal. Strength and sensation grossly intact.   Psychiatric:  Behavior, mood, and affect are appropriate.    Assessment and Plan:   1. Encounter for well adult exam without abnormal findings    2. Mixed hyperlipidemia  Chronic, ongoing without medication.  Encouraged healthy diet, decrease carbohydrates and cholesterol in diet, regular exercise, weight loss.  Due for annual labs in December 2022.  - Comp Metabolic Panel; Future  - Lipid Profile; Future    3. Essential hypertension  Chronic, ongoing.  Continue amlodipine 10 mg/day, does not need a refill at this time.  Due for annual labs in December 2022.  - Comp Metabolic Panel; Future  - Lipid Profile; Future  - TSH WITH REFLEX TO FT4; Future    4. Gastroesophageal reflux disease without esophagitis  Chronic, ongoing.  Continue omeprazole 20 mg daily, does not need refill at this time.  Due for annual labs in December 2022.  - CBC WITH DIFFERENTIAL; Future  - Comp Metabolic Panel; Future  - TSH WITH REFLEX TO FT4; Future    5. Class 1 obesity due to excess calories without serious comorbidity with body mass index (BMI) of 31.0 to 31.9 in adult  Chronic, ongoing.  Encouraged diet high in fruits, vegetables, and fiber. And a diet low in salt, refined carbohydrates, cholesterol, saturated fat, and trans fatty acids.    Encouraged a minimum of 30 minutes of moderate intensity aerobic exercise (eg, brisk walking) is recommended on five days each  week. Or 30 minutes of vigorous-intensity aerobic exercise (eg, jogging) on three days each week.   Patient's body mass index is 31.57 kg/m². Exercise and nutrition counseling were performed at this visit.  Due for annual labs in December 2022.  - CBC WITH DIFFERENTIAL; Future  - Comp Metabolic Panel; Future  - Lipid Profile; Future  - TSH WITH REFLEX TO FT4; Future    6. Routine health maintenance  Due for annual labs in December 2022.  - CBC WITH DIFFERENTIAL; Future  - Comp Metabolic Panel; Future  - Lipid Profile; Future  - TSH WITH REFLEX TO FT4; Future    7. Pneumomediastinum (HCC)  Resolved.    8. Scalp laceration, initial encounter  Resolved.    9. Closed fracture of skull with routine healing, unspecified bone, subsequent encounter  Resolved.    10. Traumatic pneumothorax, subsequent encounter  Resolved.    11. Urinary retention  Resolved.    12. Need for vaccination  Given today.  - INFLUENZA VACCINE QUAD INJ (PF)     I have placed the below orders and discussed them with an approved delegating provider.  The MA is performing the below orders under the direction of Dr. Bateman.     Health maintenance: Up-to-date  Labs per orders  Immunizations per orders.  Reports that he received 2 doses of mode during a COVID-19 vaccine, second in August 2021.  Patient counseled about skin care, diet, supplements, and exercise.  Discussed diet and exercise, colorectal cancer screening, STD prevention and adequate intake of calcium and vitamin D     Follow-up: Return in about 1 year (around 12/22/2022) for Preventative Annual, Follow up Labs, As needed.     Please note that this dictation was created using voice recognition software. I have worked with consultants from the vendor as well as technical experts from [employer] to optimize the interface. I have made every reasonable attempt to correct obvious errors, but I expect that there are errors of grammar and possibly content that I did not discover before  finalizing the note.

## 2022-03-24 DIAGNOSIS — K21.9 GASTROESOPHAGEAL REFLUX DISEASE WITHOUT ESOPHAGITIS: ICD-10-CM

## 2022-03-24 RX ORDER — OMEPRAZOLE 20 MG/1
20 CAPSULE, DELAYED RELEASE ORAL DAILY
Qty: 90 CAPSULE | Refills: 2 | Status: SHIPPED | OUTPATIENT
Start: 2022-03-24

## 2022-03-24 NOTE — TELEPHONE ENCOUNTER
Requested Prescriptions     Pending Prescriptions Disp Refills   • omeprazole (PRILOSEC) 20 MG delayed-release capsule [Pharmacy Med Name: OMEPRAZOLE DR 20 MG CAPSULE] 90 Capsule 2     Sig: TAKE 1 CAPSULE BY MOUTH EVERY DAY       JONNATHAN Ramirez.

## 2022-04-22 DIAGNOSIS — I10 ESSENTIAL HYPERTENSION: ICD-10-CM

## 2022-04-25 RX ORDER — AMLODIPINE BESYLATE 10 MG/1
10 TABLET ORAL DAILY
Qty: 90 TABLET | Refills: 2 | Status: SHIPPED | OUTPATIENT
Start: 2022-04-25

## 2022-04-25 NOTE — TELEPHONE ENCOUNTER
Requested Prescriptions     Pending Prescriptions Disp Refills   • amLODIPine (NORVASC) 10 MG Tab [Pharmacy Med Name: AMLODIPINE BESYLATE 10 MG TAB] 90 Tablet 2     Sig: TAKE 1 TABLET BY MOUTH EVERY DAY       JONNATHAN Ramirez.

## (undated) DEVICE — GOWN WARMING STANDARD FLEX - (30/CA)

## (undated) DEVICE — DRAPE 36X28IN RAD CARM BND BG - (25/CA) O

## (undated) DEVICE — TUBING CLEARLINK DUO-VENT - C-FLO (48EA/CA)

## (undated) DEVICE — PAD EYE GAUZE COVERED OVAL 1 5/8 X 2 5/8" STERILE"

## (undated) DEVICE — CATHETER IV 20 GA X 1-1/4 ---SURG.& SDS ONLY--- (50EA/BX)

## (undated) DEVICE — CANNULA W/ SUPPLY TUBING O2 - (50/CA)

## (undated) DEVICE — SENSOR SPO2 NEO LNCS ADHESIVE (20/BX) SEE USER NOTES

## (undated) DEVICE — HEAD HOLDER JUNIOR/ADULT

## (undated) DEVICE — GLOVE BIOGEL M SZ 8 SURGICAL PF LTX - (50/BX 4BX/CA)

## (undated) DEVICE — SUTURE 8-0 COATED VICRYL TG160-8 (12PK/BX)

## (undated) DEVICE — LACTATED RINGERS INJ 1000 ML - (14EA/CA 60CA/PF)

## (undated) DEVICE — KIT  I.V. START (100EA/CA)

## (undated) DEVICE — SET LEADWIRE 5 LEAD BEDSIDE DISPOSABLE ECG (1SET OF 5/EA)

## (undated) DEVICE — TISSEEL 2ML

## (undated) DEVICE — WATER IRRIGATION STERILE 1000ML (12EA/CA)

## (undated) DEVICE — ELECTRODE 850 FOAM ADHESIVE - HYDROGEL RADIOTRNSPRNT (50/PK)

## (undated) DEVICE — PACK CATARACT GENERAL (4EA/CA)

## (undated) DEVICE — SODIUM CHL IRRIGATION 0.9% 1000ML (12EA/CA)

## (undated) DEVICE — PEN SKIN MARKER W/RULER - (50EA/BX)

## (undated) DEVICE — EVICEL 2ML - (1/BX) REFRIGERATE UPON RECPT

## (undated) DEVICE — CONTAINER SPECIMEN BAG OR - STERILE 4 OZ W/LID (100EA/CA)

## (undated) DEVICE — NEEDLE FILTER ASPIRATION 18 GA X 1 1/2 IN (100EA/BX)

## (undated) DEVICE — SPEAR EYE SPNG 3ANG MLBL HNDL - (10/ST18ST/PK 180/PK 1PK/SP)

## (undated) DEVICE — SUTURE GENERAL

## (undated) DEVICE — PACK BASIC CATARACT - (4/BX)

## (undated) DEVICE — WIPE INSTRUMENT MICROWIPE (20EA/SP)